# Patient Record
Sex: MALE | Race: WHITE | NOT HISPANIC OR LATINO | Employment: OTHER | RURAL
[De-identification: names, ages, dates, MRNs, and addresses within clinical notes are randomized per-mention and may not be internally consistent; named-entity substitution may affect disease eponyms.]

---

## 2020-12-03 ENCOUNTER — HISTORICAL (OUTPATIENT)
Dept: ADMINISTRATIVE | Facility: HOSPITAL | Age: 80
End: 2020-12-03

## 2021-03-29 DIAGNOSIS — M54.50 LOW BACK PAIN: Primary | ICD-10-CM

## 2021-04-09 DIAGNOSIS — M54.9 DORSALGIA, UNSPECIFIED: ICD-10-CM

## 2021-04-12 ENCOUNTER — HOSPITAL ENCOUNTER (OUTPATIENT)
Dept: RADIOLOGY | Facility: HOSPITAL | Age: 81
Discharge: HOME | End: 2021-04-12
Attending: ORTHOPAEDIC SURGERY
Payer: MEDICARE

## 2021-04-12 ENCOUNTER — OFFICE VISIT (OUTPATIENT)
Dept: SPINE | Facility: CLINIC | Age: 81
End: 2021-04-12
Payer: MEDICARE

## 2021-04-12 VITALS — WEIGHT: 220 LBS | BODY MASS INDEX: 30.8 KG/M2 | HEIGHT: 71 IN

## 2021-04-12 DIAGNOSIS — M54.9 DORSALGIA, UNSPECIFIED: ICD-10-CM

## 2021-04-12 DIAGNOSIS — M54.50 LOW BACK PAIN: ICD-10-CM

## 2021-04-12 DIAGNOSIS — M47.26 OTHER SPONDYLOSIS WITH RADICULOPATHY, LUMBAR REGION: ICD-10-CM

## 2021-04-12 DIAGNOSIS — M54.16 LUMBAR RADICULOPATHY: Primary | ICD-10-CM

## 2021-04-12 PROCEDURE — 99999 PR PBB SHADOW E&M-EST. PATIENT-LVL V: ICD-10-PCS | Mod: PBBFAC,,, | Performed by: ORTHOPAEDIC SURGERY

## 2021-04-12 PROCEDURE — 99999 PR PBB SHADOW E&M-EST. PATIENT-LVL V: CPT | Mod: PBBFAC,,, | Performed by: ORTHOPAEDIC SURGERY

## 2021-04-12 PROCEDURE — 72110 X-RAY EXAM L-2 SPINE 4/>VWS: CPT | Mod: TC

## 2021-04-12 PROCEDURE — 99204 PR OFFICE/OUTPT VISIT, NEW, LEVL IV, 45-59 MIN: ICD-10-PCS | Mod: S$PBB,,, | Performed by: ORTHOPAEDIC SURGERY

## 2021-04-12 PROCEDURE — 72110 XR LUMBAR SPINE 5 VIEW WITH FLEX AND EXT: ICD-10-PCS | Mod: 26,,, | Performed by: ORTHOPAEDIC SURGERY

## 2021-04-12 PROCEDURE — 72110 X-RAY EXAM L-2 SPINE 4/>VWS: CPT | Mod: PBBFAC | Performed by: ORTHOPAEDIC SURGERY

## 2021-04-12 PROCEDURE — 99204 OFFICE O/P NEW MOD 45 MIN: CPT | Mod: S$PBB,,, | Performed by: ORTHOPAEDIC SURGERY

## 2021-04-12 PROCEDURE — 72110 X-RAY EXAM L-2 SPINE 4/>VWS: CPT | Mod: 26,,, | Performed by: ORTHOPAEDIC SURGERY

## 2021-04-12 PROCEDURE — 99215 OFFICE O/P EST HI 40 MIN: CPT | Mod: PBBFAC,25 | Performed by: ORTHOPAEDIC SURGERY

## 2021-04-12 RX ORDER — LISINOPRIL AND HYDROCHLOROTHIAZIDE 20; 25 MG/1; MG/1
TABLET ORAL
COMMUNITY
Start: 2020-09-11 | End: 2022-10-23

## 2021-04-12 RX ORDER — ASPIRIN 81 MG/1
TABLET ORAL
COMMUNITY
Start: 2020-09-11

## 2021-04-12 RX ORDER — BUPROPION HYDROCHLORIDE 150 MG/1
150 TABLET ORAL EVERY MORNING
Status: ON HOLD | COMMUNITY
Start: 2021-01-09 | End: 2023-04-06 | Stop reason: HOSPADM

## 2021-04-12 RX ORDER — ASCORBIC ACID 500 MG
TABLET ORAL
COMMUNITY
Start: 2021-02-01 | End: 2022-10-23

## 2021-04-12 RX ORDER — DOXYLAMINE SUCCINATE AND PHENYLEPHRINE HYDROCHLORIDE 10.5; 1 MG/1; MG/1
1 TABLET ORAL 4 TIMES DAILY PRN
Status: ON HOLD | COMMUNITY
Start: 2021-01-14 | End: 2023-04-06 | Stop reason: HOSPADM

## 2021-04-12 RX ORDER — OMEPRAZOLE 20 MG/1
CAPSULE, DELAYED RELEASE ORAL
COMMUNITY
Start: 2020-09-21 | End: 2022-10-23

## 2021-04-12 RX ORDER — LANOLIN ALCOHOL/MO/W.PET/CERES
CREAM (GRAM) TOPICAL
COMMUNITY
Start: 2020-09-11 | End: 2022-10-23

## 2021-04-12 RX ORDER — PNV NO.95/FERROUS FUM/FOLIC AC 28MG-0.8MG
1000 TABLET ORAL
COMMUNITY

## 2021-04-12 RX ORDER — CLONIDINE HYDROCHLORIDE 0.1 MG/1
TABLET ORAL
COMMUNITY
Start: 2020-09-11

## 2021-04-12 RX ORDER — VENLAFAXINE 75 MG/1
TABLET ORAL
Status: ON HOLD | COMMUNITY
Start: 2021-03-08 | End: 2023-04-06 | Stop reason: HOSPADM

## 2021-04-12 RX ORDER — LANOLIN ALCOHOL/MO/W.PET/CERES
100 CREAM (GRAM) TOPICAL
COMMUNITY

## 2021-04-12 RX ORDER — CHOLECALCIFEROL (VITAMIN D3) 25 MCG
TABLET ORAL
COMMUNITY
Start: 2021-02-01

## 2021-04-12 RX ORDER — HYDROCODONE BITARTRATE AND ACETAMINOPHEN 10; 325 MG/1; MG/1
TABLET ORAL
COMMUNITY
Start: 2021-02-01 | End: 2022-10-23

## 2021-04-12 RX ORDER — ZOLPIDEM TARTRATE 10 MG/1
10 TABLET ORAL
COMMUNITY

## 2021-04-12 RX ORDER — IBUPROFEN 100 MG/5ML
500 SUSPENSION, ORAL (FINAL DOSE FORM) ORAL
COMMUNITY
End: 2022-10-23

## 2021-04-12 RX ORDER — HYDROCODONE BITARTRATE AND ACETAMINOPHEN 10; 325 MG/1; MG/1
TABLET ORAL
COMMUNITY
Start: 2021-03-08 | End: 2022-10-23

## 2021-04-12 RX ORDER — LEVOTHYROXINE SODIUM 100 UG/1
TABLET ORAL
Status: ON HOLD | COMMUNITY
Start: 2021-03-20 | End: 2023-10-30

## 2021-04-12 RX ORDER — CITALOPRAM 40 MG/1
TABLET, FILM COATED ORAL
COMMUNITY
Start: 2020-09-11

## 2021-04-22 ENCOUNTER — TELEPHONE (OUTPATIENT)
Dept: SPINE | Facility: CLINIC | Age: 81
End: 2021-04-22

## 2021-04-29 ENCOUNTER — TELEPHONE (OUTPATIENT)
Dept: SPINE | Facility: CLINIC | Age: 81
End: 2021-04-29

## 2021-05-24 ENCOUNTER — HOSPITAL ENCOUNTER (EMERGENCY)
Facility: HOSPITAL | Age: 81
Discharge: HOME | End: 2021-05-24
Attending: EMERGENCY MEDICINE
Payer: MEDICARE

## 2021-05-24 VITALS
WEIGHT: 216 LBS | HEART RATE: 64 BPM | DIASTOLIC BLOOD PRESSURE: 70 MMHG | RESPIRATION RATE: 18 BRPM | OXYGEN SATURATION: 96 % | TEMPERATURE: 98 F | HEIGHT: 71 IN | BODY MASS INDEX: 30.24 KG/M2 | SYSTOLIC BLOOD PRESSURE: 129 MMHG

## 2021-05-24 DIAGNOSIS — Z13.9 SCREENING FOR CONDITION: ICD-10-CM

## 2021-05-24 DIAGNOSIS — I50.21 ACUTE SYSTOLIC CONGESTIVE HEART FAILURE: ICD-10-CM

## 2021-05-24 DIAGNOSIS — R06.02 SHORTNESS OF BREATH: Primary | ICD-10-CM

## 2021-05-24 LAB
ALBUMIN SERPL BCP-MCNC: 3.9 G/DL (ref 3.5–5)
ALBUMIN/GLOB SERPL: 1.1 {RATIO}
ALP SERPL-CCNC: 87 U/L (ref 45–115)
ALT SERPL W P-5'-P-CCNC: 29 U/L (ref 16–61)
ANION GAP SERPL CALCULATED.3IONS-SCNC: 17 MMOL/L (ref 7–16)
AST SERPL W P-5'-P-CCNC: 32 U/L (ref 15–37)
BASOPHILS # BLD AUTO: 0.05 K/UL (ref 0–0.2)
BASOPHILS NFR BLD AUTO: 0.8 % (ref 0–1)
BILIRUB SERPL-MCNC: 0.7 MG/DL (ref 0–1.2)
BUN SERPL-MCNC: 20 MG/DL (ref 7–18)
BUN/CREAT SERPL: 16 (ref 6–20)
CALCIUM SERPL-MCNC: 9.4 MG/DL (ref 8.5–10.1)
CHLORIDE SERPL-SCNC: 103 MMOL/L (ref 98–107)
CO2 SERPL-SCNC: 24 MMOL/L (ref 21–32)
CREAT SERPL-MCNC: 1.25 MG/DL (ref 0.7–1.3)
D DIMER PPP FEU-MCNC: 0.59 ΜG/ML (ref 0–0.47)
DIFFERENTIAL METHOD BLD: ABNORMAL
EOSINOPHIL # BLD AUTO: 0.14 K/UL (ref 0–0.5)
EOSINOPHIL NFR BLD AUTO: 2.1 % (ref 1–4)
ERYTHROCYTE [DISTWIDTH] IN BLOOD BY AUTOMATED COUNT: 12.4 % (ref 11.5–14.5)
GLOBULIN SER-MCNC: 3.7 G/DL (ref 2–4)
GLUCOSE SERPL-MCNC: 105 MG/DL (ref 74–106)
HCT VFR BLD AUTO: 47.4 % (ref 40–54)
HGB BLD-MCNC: 15.8 G/DL (ref 13.5–18)
IMM GRANULOCYTES # BLD AUTO: 0.01 K/UL (ref 0–0.04)
IMM GRANULOCYTES NFR BLD: 0.2 % (ref 0–0.4)
LYMPHOCYTES # BLD AUTO: 1.44 K/UL (ref 1–4.8)
LYMPHOCYTES NFR BLD AUTO: 21.8 % (ref 27–41)
MCH RBC QN AUTO: 31.2 PG (ref 27–31)
MCHC RBC AUTO-ENTMCNC: 33.3 G/DL (ref 32–36)
MCV RBC AUTO: 93.5 FL (ref 80–96)
MONOCYTES # BLD AUTO: 0.98 K/UL (ref 0–0.8)
MONOCYTES NFR BLD AUTO: 14.8 % (ref 2–6)
MPC BLD CALC-MCNC: 9.6 FL (ref 9.4–12.4)
NEUTROPHILS # BLD AUTO: 3.98 K/UL (ref 1.8–7.7)
NEUTROPHILS NFR BLD AUTO: 60.3 % (ref 53–65)
NRBC # BLD AUTO: 0 X10E3/UL
NRBC, AUTO (.00): 0 %
NT-PROBNP SERPL-MCNC: 509 PG/ML (ref 1–450)
PLATELET # BLD AUTO: 265 K/UL (ref 150–400)
POTASSIUM SERPL-SCNC: 4.1 MMOL/L (ref 3.5–5.1)
PROT SERPL-MCNC: 7.6 G/DL (ref 6.4–8.2)
RBC # BLD AUTO: 5.07 M/UL (ref 4.6–6.2)
SODIUM SERPL-SCNC: 140 MMOL/L (ref 136–145)
TROPONIN I SERPL-MCNC: <0.017 NG/ML
TROPONIN I SERPL-MCNC: <0.017 NG/ML
WBC # BLD AUTO: 6.6 K/UL (ref 4.5–11)

## 2021-05-24 PROCEDURE — 99284 PR EMERGENCY DEPT VISIT,LEVEL IV: ICD-10-PCS | Mod: ,,, | Performed by: EMERGENCY MEDICINE

## 2021-05-24 PROCEDURE — 25000242 PHARM REV CODE 250 ALT 637 W/ HCPCS: Performed by: EMERGENCY MEDICINE

## 2021-05-24 PROCEDURE — 96374 THER/PROPH/DIAG INJ IV PUSH: CPT | Mod: 59 | Performed by: EMERGENCY MEDICINE

## 2021-05-24 PROCEDURE — 36415 COLL VENOUS BLD VENIPUNCTURE: CPT | Performed by: EMERGENCY MEDICINE

## 2021-05-24 PROCEDURE — 93005 ELECTROCARDIOGRAM TRACING: CPT

## 2021-05-24 PROCEDURE — 80053 COMPREHEN METABOLIC PANEL: CPT | Performed by: EMERGENCY MEDICINE

## 2021-05-24 PROCEDURE — 85378 FIBRIN DEGRADE SEMIQUANT: CPT | Performed by: EMERGENCY MEDICINE

## 2021-05-24 PROCEDURE — 99284 EMERGENCY DEPT VISIT MOD MDM: CPT | Mod: ,,, | Performed by: EMERGENCY MEDICINE

## 2021-05-24 PROCEDURE — 85025 COMPLETE CBC W/AUTO DIFF WBC: CPT | Performed by: EMERGENCY MEDICINE

## 2021-05-24 PROCEDURE — 93010 ELECTROCARDIOGRAM REPORT: CPT | Mod: ,,, | Performed by: HOSPITALIST

## 2021-05-24 PROCEDURE — 83880 ASSAY OF NATRIURETIC PEPTIDE: CPT | Performed by: EMERGENCY MEDICINE

## 2021-05-24 PROCEDURE — 84484 ASSAY OF TROPONIN QUANT: CPT | Mod: 59 | Performed by: EMERGENCY MEDICINE

## 2021-05-24 PROCEDURE — 63600175 PHARM REV CODE 636 W HCPCS: Performed by: EMERGENCY MEDICINE

## 2021-05-24 PROCEDURE — 99285 EMERGENCY DEPT VISIT HI MDM: CPT | Mod: 25 | Performed by: EMERGENCY MEDICINE

## 2021-05-24 PROCEDURE — 84484 ASSAY OF TROPONIN QUANT: CPT | Performed by: EMERGENCY MEDICINE

## 2021-05-24 PROCEDURE — 93010 EKG 12-LEAD: ICD-10-PCS | Mod: ,,, | Performed by: HOSPITALIST

## 2021-05-24 PROCEDURE — 25500020 PHARM REV CODE 255: Performed by: EMERGENCY MEDICINE

## 2021-05-24 RX ORDER — IPRATROPIUM BROMIDE AND ALBUTEROL SULFATE 2.5; .5 MG/3ML; MG/3ML
3 SOLUTION RESPIRATORY (INHALATION)
Status: COMPLETED | OUTPATIENT
Start: 2021-05-24 | End: 2021-05-24

## 2021-05-24 RX ORDER — FUROSEMIDE 10 MG/ML
20 INJECTION INTRAMUSCULAR; INTRAVENOUS
Status: COMPLETED | OUTPATIENT
Start: 2021-05-24 | End: 2021-05-24

## 2021-05-24 RX ORDER — BUDESONIDE 0.5 MG/2ML
0.5 INHALANT ORAL ONCE
Status: COMPLETED | OUTPATIENT
Start: 2021-05-24 | End: 2021-05-24

## 2021-05-24 RX ADMIN — IPRATROPIUM BROMIDE AND ALBUTEROL SULFATE 3 ML: .5; 3 SOLUTION RESPIRATORY (INHALATION) at 05:05

## 2021-05-24 RX ADMIN — FUROSEMIDE 20 MG: 10 INJECTION, SOLUTION INTRAMUSCULAR; INTRAVENOUS at 07:05

## 2021-05-24 RX ADMIN — BUDESONIDE 0.5 MG: 0.5 INHALANT RESPIRATORY (INHALATION) at 07:05

## 2021-05-24 RX ADMIN — IOPAMIDOL 100 ML: 755 INJECTION, SOLUTION INTRAVENOUS at 05:05

## 2022-10-23 ENCOUNTER — OFFICE VISIT (OUTPATIENT)
Dept: FAMILY MEDICINE | Facility: CLINIC | Age: 82
End: 2022-10-23
Payer: MEDICARE

## 2022-10-23 VITALS
WEIGHT: 216 LBS | SYSTOLIC BLOOD PRESSURE: 128 MMHG | RESPIRATION RATE: 18 BRPM | HEIGHT: 71 IN | OXYGEN SATURATION: 95 % | HEART RATE: 98 BPM | DIASTOLIC BLOOD PRESSURE: 79 MMHG | BODY MASS INDEX: 30.24 KG/M2 | TEMPERATURE: 99 F

## 2022-10-23 DIAGNOSIS — Z11.52 ENCOUNTER FOR SCREENING LABORATORY TESTING FOR COVID-19 VIRUS: ICD-10-CM

## 2022-10-23 DIAGNOSIS — U07.1 COVID: Primary | ICD-10-CM

## 2022-10-23 DIAGNOSIS — R68.83 CHILLS: ICD-10-CM

## 2022-10-23 PROBLEM — G89.29 CHRONIC PAIN: Status: ACTIVE | Noted: 2022-10-23

## 2022-10-23 PROBLEM — I48.0 PAROXYSMAL ATRIAL FIBRILLATION: Status: ACTIVE | Noted: 2022-10-23

## 2022-10-23 PROBLEM — G47.30 SLEEP APNEA: Status: ACTIVE | Noted: 2022-10-23

## 2022-10-23 PROBLEM — R73.03 PREDIABETES: Status: ACTIVE | Noted: 2022-10-23

## 2022-10-23 PROBLEM — E55.9 VITAMIN D DEFICIENCY: Status: ACTIVE | Noted: 2022-10-23

## 2022-10-23 PROBLEM — N40.0 BENIGN PROSTATIC HYPERPLASIA: Status: ACTIVE | Noted: 2021-05-14

## 2022-10-23 PROBLEM — R39.15 URINARY URGENCY: Status: ACTIVE | Noted: 2019-04-09

## 2022-10-23 PROBLEM — F32.9 REACTIVE DEPRESSION: Status: ACTIVE | Noted: 2022-10-23

## 2022-10-23 PROBLEM — K21.9 GASTROESOPHAGEAL REFLUX DISEASE WITHOUT ESOPHAGITIS: Status: ACTIVE | Noted: 2022-10-23

## 2022-10-23 PROBLEM — F41.1 GENERALIZED ANXIETY DISORDER: Status: ACTIVE | Noted: 2022-10-23

## 2022-10-23 PROBLEM — S01.81XA LACERATION OF FOREHEAD, COMPLICATED: Status: ACTIVE | Noted: 2022-10-23

## 2022-10-23 PROBLEM — J32.9 RHINOSINUSITIS: Status: ACTIVE | Noted: 2022-10-23

## 2022-10-23 PROBLEM — R35.0 FREQUENCY OF URINATION: Status: ACTIVE | Noted: 2022-10-23

## 2022-10-23 PROBLEM — Z71.89 OTHER SPECIFIED COUNSELING: Status: ACTIVE | Noted: 2022-10-23

## 2022-10-23 PROBLEM — M54.50 LOW BACK PAIN: Status: ACTIVE | Noted: 2022-10-23

## 2022-10-23 PROBLEM — E66.9 OBESITY: Status: ACTIVE | Noted: 2022-10-23

## 2022-10-23 PROBLEM — G47.61 PERIODIC LIMB MOVEMENT DISORDER: Status: ACTIVE | Noted: 2022-10-23

## 2022-10-23 PROBLEM — F32.1 MODERATE MAJOR DEPRESSION, SINGLE EPISODE: Status: ACTIVE | Noted: 2022-10-23

## 2022-10-23 PROBLEM — E53.8 COBALAMIN DEFICIENCY: Status: ACTIVE | Noted: 2022-10-23

## 2022-10-23 PROBLEM — I10 ESSENTIAL HYPERTENSION: Status: ACTIVE | Noted: 2022-10-23

## 2022-10-23 PROBLEM — E03.9 HYPOTHYROIDISM: Status: ACTIVE | Noted: 2022-10-23

## 2022-10-23 PROBLEM — E78.2 MIXED HYPERLIPIDEMIA: Status: ACTIVE | Noted: 2022-10-23

## 2022-10-23 LAB
CTP QC/QA: YES
CTP QC/QA: YES
FLUAV AG NPH QL: NEGATIVE
FLUBV AG NPH QL: NEGATIVE
SARS-COV-2 AG RESP QL IA.RAPID: POSITIVE

## 2022-10-23 PROCEDURE — 99203 PR OFFICE/OUTPT VISIT, NEW, LEVL III, 30-44 MIN: ICD-10-PCS | Mod: CR,,, | Performed by: NURSE PRACTITIONER

## 2022-10-23 PROCEDURE — 87426 SARSCOV CORONAVIRUS AG IA: CPT | Mod: RHCUB | Performed by: NURSE PRACTITIONER

## 2022-10-23 PROCEDURE — 87804 INFLUENZA ASSAY W/OPTIC: CPT | Mod: 59,91,RHCUB | Performed by: NURSE PRACTITIONER

## 2022-10-23 PROCEDURE — 99203 OFFICE O/P NEW LOW 30 MIN: CPT | Mod: CR,,, | Performed by: NURSE PRACTITIONER

## 2022-10-23 RX ORDER — PYRIDOXINE HCL (VITAMIN B6) 100 MG
1 TABLET ORAL
Status: ON HOLD | COMMUNITY
End: 2023-04-06 | Stop reason: HOSPADM

## 2022-10-23 RX ORDER — CYANOCOBALAMIN (VITAMIN B-12) 500 MCG
3 TABLET ORAL NIGHTLY PRN
Status: ON HOLD | COMMUNITY
End: 2023-10-30

## 2022-10-23 RX ORDER — ZINC SULFATE 50(220)MG
1 CAPSULE ORAL DAILY
Status: ON HOLD | COMMUNITY
Start: 2022-03-08 | End: 2023-04-06 | Stop reason: HOSPADM

## 2022-10-23 RX ORDER — CEPHALEXIN 250 MG
1 CAPSULE ORAL
Status: ON HOLD | COMMUNITY
End: 2023-04-06 | Stop reason: HOSPADM

## 2022-10-23 RX ORDER — CALCIUM CARBONATE 600 MG
TABLET ORAL
Status: ON HOLD | COMMUNITY
End: 2023-04-06 | Stop reason: HOSPADM

## 2022-10-23 RX ORDER — METHYLPHENIDATE HYDROCHLORIDE 5 MG/1
5 TABLET ORAL
Status: ON HOLD | COMMUNITY
End: 2023-04-06 | Stop reason: HOSPADM

## 2022-10-23 RX ORDER — MUPIROCIN 20 MG/G
OINTMENT TOPICAL 2 TIMES DAILY
Status: ON HOLD | COMMUNITY
Start: 2022-08-22 | End: 2023-04-06 | Stop reason: HOSPADM

## 2022-10-23 RX ORDER — OXYBUTYNIN CHLORIDE 15 MG/1
1 TABLET, EXTENDED RELEASE ORAL DAILY
COMMUNITY
Start: 2022-02-21

## 2022-10-23 RX ORDER — METHOCARBAMOL 500 MG/1
1 TABLET, FILM COATED ORAL
Status: ON HOLD | COMMUNITY
End: 2023-04-06 | Stop reason: HOSPADM

## 2022-10-23 RX ORDER — PANTOPRAZOLE SODIUM 40 MG/1
40 TABLET, DELAYED RELEASE ORAL DAILY
Status: ON HOLD | COMMUNITY
Start: 2022-07-13 | End: 2023-04-06 | Stop reason: HOSPADM

## 2022-10-23 RX ORDER — LISINOPRIL AND HYDROCHLOROTHIAZIDE 12.5; 2 MG/1; MG/1
1 TABLET ORAL
Status: ON HOLD | COMMUNITY
End: 2023-04-06 | Stop reason: HOSPADM

## 2022-10-23 RX ORDER — ARIPIPRAZOLE 5 MG/1
1 TABLET ORAL
COMMUNITY
Start: 2022-07-05

## 2022-10-23 RX ORDER — POLYETHYLENE GLYCOL 3350 17 G/17G
17 POWDER, FOR SOLUTION ORAL
COMMUNITY

## 2022-10-23 RX ORDER — ARIPIPRAZOLE 2 MG/1
2 TABLET ORAL NIGHTLY
Status: ON HOLD | COMMUNITY
Start: 2022-06-06 | End: 2023-04-06 | Stop reason: HOSPADM

## 2022-10-23 RX ORDER — SERTRALINE HYDROCHLORIDE 50 MG/1
1 TABLET, FILM COATED ORAL
Status: ON HOLD | COMMUNITY
End: 2023-04-06 | Stop reason: HOSPADM

## 2022-10-23 RX ORDER — OMEPRAZOLE 40 MG/1
40 CAPSULE, DELAYED RELEASE ORAL
COMMUNITY
Start: 2022-07-25

## 2022-10-23 RX ORDER — PSYLLIUM HUSK/CALCIUM CARB 1 G-60 MG
CAPSULE ORAL
Status: ON HOLD | COMMUNITY
End: 2023-04-06 | Stop reason: HOSPADM

## 2022-10-23 RX ORDER — MAGNESIUM OXIDE/MAG AA CHELATE 300 MG
CAPSULE ORAL
Status: ON HOLD | COMMUNITY
End: 2023-04-06 | Stop reason: HOSPADM

## 2022-10-23 RX ORDER — HYDROCODONE BITARTRATE AND ACETAMINOPHEN 7.5; 325 MG/1; MG/1
1 TABLET ORAL
Status: ON HOLD | COMMUNITY
End: 2023-04-06 | Stop reason: HOSPADM

## 2022-10-23 RX ORDER — TRAMADOL HYDROCHLORIDE 50 MG/1
1 TABLET ORAL
Status: ON HOLD | COMMUNITY
End: 2023-04-06 | Stop reason: HOSPADM

## 2022-10-23 RX ORDER — ESZOPICLONE 3 MG/1
3 TABLET, FILM COATED ORAL NIGHTLY PRN
Status: ON HOLD | COMMUNITY
Start: 2022-07-05 | End: 2023-04-06 | Stop reason: HOSPADM

## 2022-10-23 RX ORDER — VITAMIN E 268 MG
1 CAPSULE ORAL
COMMUNITY

## 2022-10-23 RX ORDER — CLORAZEPATE DIPOTASSIUM 3.75 MG/1
3.75-7.5 TABLET ORAL 3 TIMES DAILY PRN
Status: ON HOLD | COMMUNITY
Start: 2022-07-13 | End: 2023-04-06 | Stop reason: HOSPADM

## 2022-10-23 RX ORDER — PRAMIPEXOLE DIHYDROCHLORIDE 0.12 MG/1
0.12 TABLET ORAL
Status: ON HOLD | COMMUNITY
Start: 2022-09-23 | End: 2023-04-06 | Stop reason: HOSPADM

## 2022-10-23 NOTE — PROGRESS NOTES
"  Subjective:       Patient ID: Joey King is a 82 y.o. male.    Chief Complaint: Chills (Chills started this morning.  Took flu shot a week ago)    Presents to clinic with son as above. Mild cough. No measured fever. No SOB. No nasal congestion or runny nose. No sore throat.     Review of Systems   Constitutional:  Positive for chills and malaise/fatigue.   HENT:  Negative for congestion, ear pain and sore throat.    Respiratory:  Positive for cough. Negative for hemoptysis, sputum production, shortness of breath and wheezing.    Cardiovascular: Negative.    Gastrointestinal: Negative.    Musculoskeletal:  Positive for myalgias.        Reviewed family, medical, surgical, and social history.    Objective:      /79   Pulse 98   Temp 98.7 °F (37.1 °C)   Resp 18   Ht 5' 11" (1.803 m)   Wt 98 kg (216 lb)   SpO2 95%   BMI 30.13 kg/m²   Physical Exam  Vitals and nursing note reviewed.   Constitutional:       General: He is not in acute distress.     Appearance: Normal appearance. He is not ill-appearing, toxic-appearing or diaphoretic.   HENT:      Head: Normocephalic.      Right Ear: Hearing, tympanic membrane, ear canal and external ear normal.      Left Ear: Hearing, tympanic membrane, ear canal and external ear normal.      Nose: No mucosal edema, congestion or rhinorrhea.      Right Turbinates: Not enlarged or swollen.      Left Turbinates: Not enlarged or swollen.      Right Sinus: No maxillary sinus tenderness or frontal sinus tenderness.      Left Sinus: No maxillary sinus tenderness or frontal sinus tenderness.      Mouth/Throat:      Lips: Pink.      Mouth: Mucous membranes are moist.      Pharynx: Uvula midline. No pharyngeal swelling, oropharyngeal exudate, posterior oropharyngeal erythema or uvula swelling.      Tonsils: No tonsillar exudate or tonsillar abscesses.   Cardiovascular:      Rate and Rhythm: Normal rate and regular rhythm.      Heart sounds: Normal heart sounds. "   Pulmonary:      Effort: Pulmonary effort is normal.      Breath sounds: Normal breath sounds.   Skin:     General: Skin is warm and dry.   Neurological:      Mental Status: He is alert.   Psychiatric:         Mood and Affect: Mood normal.         Behavior: Behavior normal.         Thought Content: Thought content normal.         Judgment: Judgment normal.          Office Visit on 10/23/2022   Component Date Value Ref Range Status    Rapid Influenza A Ag 10/23/2022 Negative  Negative Final    Rapid Influenza B Ag 10/23/2022 Negative  Negative Final     Acceptable 10/23/2022 Yes   Final    SARS Coronavirus 2 Antigen 10/23/2022 Positive (A)  Negative Final     Acceptable 10/23/2022 Yes   Final      Assessment:       1. COVID    2. Chills    3. Encounter for screening laboratory testing for COVID-19 virus          Plan:       COVID  -     Ambulatory referral/consult to [facility]; Future; Expected date: 10/24/2022    Chills  -     POCT Influenza A/B    Encounter for screening laboratory testing for COVID-19 virus  -     SARS Coronavirus 2 Antigen, POCT  Quarantine for 5-7 days  OTC meds for symptomatic relief  Offered infusion and unsure if will get it. He will decide and cancel when they call if decides not to get it.   Drink plenty of fluids  Go to ER with any respiratory distress  Copy of result given  RTC PRN           Risks, benefits, and side effects were discussed with the patient. All questions were answered to the fullest satisfaction of the patient, and pt verbalized understanding and agreement to treatment plan. Pt was to call with any new or worsening symptoms, or present to the ER.

## 2022-10-24 ENCOUNTER — INFUSION (OUTPATIENT)
Dept: INFECTIOUS DISEASES | Facility: HOSPITAL | Age: 82
End: 2022-10-24
Attending: NURSE PRACTITIONER
Payer: MEDICARE

## 2022-10-24 VITALS
DIASTOLIC BLOOD PRESSURE: 66 MMHG | OXYGEN SATURATION: 96 % | SYSTOLIC BLOOD PRESSURE: 100 MMHG | HEART RATE: 85 BPM | TEMPERATURE: 98 F | RESPIRATION RATE: 18 BRPM

## 2022-10-24 DIAGNOSIS — U07.1 COVID: ICD-10-CM

## 2022-10-24 PROCEDURE — M0222 HC IV INJECTION, BEBTELOVIMAB, INCL POST ADMIN MONIT: HCPCS | Performed by: NURSE PRACTITIONER

## 2022-10-24 PROCEDURE — 63600175 PHARM REV CODE 636 W HCPCS: Performed by: NURSE PRACTITIONER

## 2022-10-24 RX ORDER — BEBTELOVIMAB 87.5 MG/ML
175 INJECTION, SOLUTION INTRAVENOUS
Status: COMPLETED | OUTPATIENT
Start: 2022-10-24 | End: 2022-10-24

## 2022-10-24 RX ORDER — EPINEPHRINE 0.3 MG/.3ML
0.3 INJECTION SUBCUTANEOUS
Status: ACTIVE | OUTPATIENT
Start: 2022-10-24 | End: 2022-10-27

## 2022-10-24 RX ORDER — DIPHENHYDRAMINE HYDROCHLORIDE 50 MG/ML
25 INJECTION INTRAMUSCULAR; INTRAVENOUS
Status: ACTIVE | OUTPATIENT
Start: 2022-10-24 | End: 2022-10-25

## 2022-10-24 RX ORDER — ACETAMINOPHEN 325 MG/1
650 TABLET ORAL
Status: ACTIVE | OUTPATIENT
Start: 2022-10-24 | End: 2022-10-25

## 2022-10-24 RX ORDER — ALBUTEROL SULFATE 90 UG/1
2 AEROSOL, METERED RESPIRATORY (INHALATION)
Status: ACTIVE | OUTPATIENT
Start: 2022-10-24 | End: 2022-10-27

## 2022-10-24 RX ORDER — ONDANSETRON 4 MG/1
4 TABLET, ORALLY DISINTEGRATING ORAL
Status: ACTIVE | OUTPATIENT
Start: 2022-10-24 | End: 2022-10-25

## 2022-10-24 RX ADMIN — BEBTELOVIMAB 175 MG: 87.5 INJECTION, SOLUTION INTRAVENOUS at 10:10

## 2023-03-29 ENCOUNTER — HOSPITAL ENCOUNTER (INPATIENT)
Facility: HOSPITAL | Age: 83
LOS: 8 days | Discharge: HOME HEALTH | DRG: 948 | End: 2023-04-06
Attending: FAMILY MEDICINE | Admitting: FAMILY MEDICINE
Payer: MEDICARE

## 2023-03-29 DIAGNOSIS — E53.8 COBALAMIN DEFICIENCY: ICD-10-CM

## 2023-03-29 DIAGNOSIS — I10 ESSENTIAL HYPERTENSION: ICD-10-CM

## 2023-03-29 DIAGNOSIS — R53.1 WEAKNESS GENERALIZED: ICD-10-CM

## 2023-03-29 DIAGNOSIS — E55.9 VITAMIN D DEFICIENCY: ICD-10-CM

## 2023-03-29 DIAGNOSIS — I48.0 PAROXYSMAL ATRIAL FIBRILLATION: ICD-10-CM

## 2023-03-29 DIAGNOSIS — E03.9 HYPOTHYROIDISM, UNSPECIFIED TYPE: ICD-10-CM

## 2023-03-29 DIAGNOSIS — R53.1 WEAKNESS: Primary | ICD-10-CM

## 2023-03-29 DIAGNOSIS — K21.9 GASTROESOPHAGEAL REFLUX DISEASE WITHOUT ESOPHAGITIS: ICD-10-CM

## 2023-03-29 DIAGNOSIS — G47.33 OSA (OBSTRUCTIVE SLEEP APNEA): ICD-10-CM

## 2023-03-29 DIAGNOSIS — R35.0 FREQUENCY OF URINATION: ICD-10-CM

## 2023-03-29 LAB
BACTERIA #/AREA URNS HPF: ABNORMAL /HPF
BILIRUB UR QL STRIP: NEGATIVE
CLARITY UR: CLEAR
COLOR UR: YELLOW
GLUCOSE UR STRIP-MCNC: NEGATIVE MG/DL
KETONES UR STRIP-SCNC: NEGATIVE MG/DL
LEUKOCYTE ESTERASE UR QL STRIP: NEGATIVE
Lab: ABNORMAL /LPF
NITRITE UR QL STRIP: NEGATIVE
PH UR STRIP: 5.5 PH UNITS
PROT UR QL STRIP: 100
RBC # UR STRIP: ABNORMAL /UL
RBC #/AREA URNS HPF: ABNORMAL /HPF
RENAL EPI CELLS #/AREA URNS LPF: ABNORMAL /LPF
SODIUM URATE CRYSTALS [#/AREA] IN URINE SEDIMENT BY MICROSCOPY LOW POWER FIELD: ABNORMAL /LPF
SP GR UR STRIP: >=1.03
SQUAMOUS #/AREA URNS LPF: ABNORMAL /LPF
TRANS CELLS #/AREA URNS LPF: ABNORMAL /LPF
UROBILINOGEN UR STRIP-ACNC: 0.2 MG/DL
WBC #/AREA URNS HPF: ABNORMAL /HPF
YEAST #/AREA URNS HPF: ABNORMAL /HPF

## 2023-03-29 PROCEDURE — 87086 URINE CULTURE/COLONY COUNT: CPT | Performed by: NURSE PRACTITIONER

## 2023-03-29 PROCEDURE — 94761 N-INVAS EAR/PLS OXIMETRY MLT: CPT

## 2023-03-29 PROCEDURE — 99900035 HC TECH TIME PER 15 MIN (STAT)

## 2023-03-29 PROCEDURE — 87077 CULTURE AEROBIC IDENTIFY: CPT | Performed by: NURSE PRACTITIONER

## 2023-03-29 PROCEDURE — 11000004 HC SNF PRIVATE

## 2023-03-29 PROCEDURE — 25000003 PHARM REV CODE 250: Performed by: NURSE PRACTITIONER

## 2023-03-29 PROCEDURE — 25000003 PHARM REV CODE 250: Performed by: FAMILY MEDICINE

## 2023-03-29 PROCEDURE — 81001 URINALYSIS AUTO W/SCOPE: CPT | Performed by: NURSE PRACTITIONER

## 2023-03-29 RX ORDER — CITALOPRAM 20 MG/1
40 TABLET, FILM COATED ORAL DAILY
Status: DISCONTINUED | OUTPATIENT
Start: 2023-03-30 | End: 2023-04-06 | Stop reason: HOSPADM

## 2023-03-29 RX ORDER — CLONIDINE HYDROCHLORIDE 0.1 MG/1
0.1 TABLET ORAL NIGHTLY
Status: DISCONTINUED | OUTPATIENT
Start: 2023-03-29 | End: 2023-04-06 | Stop reason: HOSPADM

## 2023-03-29 RX ORDER — LEVALBUTEROL 1.25 MG/.5ML
0.25 SOLUTION, CONCENTRATE RESPIRATORY (INHALATION) EVERY 4 HOURS PRN
Status: DISCONTINUED | OUTPATIENT
Start: 2023-03-29 | End: 2023-03-31

## 2023-03-29 RX ORDER — TALC
6 POWDER (GRAM) TOPICAL NIGHTLY PRN
Status: DISCONTINUED | OUTPATIENT
Start: 2023-03-29 | End: 2023-03-29

## 2023-03-29 RX ORDER — LANOLIN ALCOHOL/MO/W.PET/CERES
100 CREAM (GRAM) TOPICAL DAILY
Status: DISCONTINUED | OUTPATIENT
Start: 2023-03-30 | End: 2023-03-30

## 2023-03-29 RX ORDER — MUPIROCIN 20 MG/G
OINTMENT TOPICAL 2 TIMES DAILY
Status: COMPLETED | OUTPATIENT
Start: 2023-03-29 | End: 2023-04-03

## 2023-03-29 RX ORDER — AMOXICILLIN 250 MG
1 CAPSULE ORAL 2 TIMES DAILY
Status: DISCONTINUED | OUTPATIENT
Start: 2023-03-29 | End: 2023-04-06 | Stop reason: HOSPADM

## 2023-03-29 RX ORDER — LEVOTHYROXINE SODIUM 100 UG/1
100 TABLET ORAL
Status: DISCONTINUED | OUTPATIENT
Start: 2023-03-30 | End: 2023-04-06 | Stop reason: HOSPADM

## 2023-03-29 RX ORDER — GLUCOSAM/CHONDRO/HERB 149/HYAL 750-100 MG
1 TABLET ORAL DAILY
Status: DISCONTINUED | OUTPATIENT
Start: 2023-03-30 | End: 2023-04-06 | Stop reason: HOSPADM

## 2023-03-29 RX ORDER — OXYBUTYNIN CHLORIDE 5 MG/1
15 TABLET, EXTENDED RELEASE ORAL DAILY
Status: DISCONTINUED | OUTPATIENT
Start: 2023-03-30 | End: 2023-04-06 | Stop reason: HOSPADM

## 2023-03-29 RX ORDER — ARIPIPRAZOLE 5 MG/1
5 TABLET ORAL DAILY
Status: DISCONTINUED | OUTPATIENT
Start: 2023-03-30 | End: 2023-04-06 | Stop reason: HOSPADM

## 2023-03-29 RX ORDER — ONDANSETRON 4 MG/1
4 TABLET, ORALLY DISINTEGRATING ORAL EVERY 8 HOURS PRN
Status: DISCONTINUED | OUTPATIENT
Start: 2023-03-29 | End: 2023-04-06 | Stop reason: HOSPADM

## 2023-03-29 RX ORDER — FLECAINIDE ACETATE 50 MG/1
150 TABLET ORAL EVERY 12 HOURS
Status: DISCONTINUED | OUTPATIENT
Start: 2023-03-29 | End: 2023-04-06 | Stop reason: HOSPADM

## 2023-03-29 RX ORDER — UBIDECARENONE 75 MG
1000 CAPSULE ORAL DAILY
Status: DISCONTINUED | OUTPATIENT
Start: 2023-03-30 | End: 2023-04-06 | Stop reason: HOSPADM

## 2023-03-29 RX ORDER — TALC
6 POWDER (GRAM) TOPICAL NIGHTLY PRN
Status: DISCONTINUED | OUTPATIENT
Start: 2023-03-29 | End: 2023-03-30

## 2023-03-29 RX ORDER — ACETAMINOPHEN 325 MG/1
650 TABLET ORAL EVERY 6 HOURS PRN
Status: DISCONTINUED | OUTPATIENT
Start: 2023-03-29 | End: 2023-04-06 | Stop reason: HOSPADM

## 2023-03-29 RX ORDER — ZOLPIDEM TARTRATE 5 MG/1
10 TABLET ORAL NIGHTLY PRN
Status: DISCONTINUED | OUTPATIENT
Start: 2023-03-29 | End: 2023-04-06 | Stop reason: HOSPADM

## 2023-03-29 RX ORDER — LANOLIN ALCOHOL/MO/W.PET/CERES
1 CREAM (GRAM) TOPICAL DAILY
Status: DISCONTINUED | OUTPATIENT
Start: 2023-03-30 | End: 2023-04-06 | Stop reason: HOSPADM

## 2023-03-29 RX ORDER — CHOLECALCIFEROL (VITAMIN D3) 25 MCG
1000 TABLET ORAL DAILY
Status: DISCONTINUED | OUTPATIENT
Start: 2023-03-30 | End: 2023-04-06 | Stop reason: HOSPADM

## 2023-03-29 RX ORDER — PANTOPRAZOLE SODIUM 40 MG/1
40 TABLET, DELAYED RELEASE ORAL DAILY
Status: DISCONTINUED | OUTPATIENT
Start: 2023-03-30 | End: 2023-04-06 | Stop reason: HOSPADM

## 2023-03-29 RX ORDER — ZINC SULFATE 50(220)MG
220 CAPSULE ORAL DAILY
Status: DISCONTINUED | OUTPATIENT
Start: 2023-03-30 | End: 2023-04-06 | Stop reason: HOSPADM

## 2023-03-29 RX ORDER — CALCIUM CARBONATE 200(500)MG
500 TABLET,CHEWABLE ORAL 2 TIMES DAILY PRN
Status: DISCONTINUED | OUTPATIENT
Start: 2023-03-29 | End: 2023-04-06 | Stop reason: HOSPADM

## 2023-03-29 RX ORDER — ALBUTEROL SULFATE 0.83 MG/ML
2.5 SOLUTION RESPIRATORY (INHALATION) EVERY 4 HOURS PRN
Status: DISCONTINUED | OUTPATIENT
Start: 2023-03-29 | End: 2023-03-29

## 2023-03-29 RX ORDER — ASPIRIN 81 MG/1
81 TABLET ORAL NIGHTLY
Status: DISCONTINUED | OUTPATIENT
Start: 2023-03-29 | End: 2023-04-06 | Stop reason: HOSPADM

## 2023-03-29 RX ORDER — ASCORBIC ACID 500 MG
1000 TABLET ORAL 2 TIMES DAILY
Status: DISCONTINUED | OUTPATIENT
Start: 2023-03-29 | End: 2023-04-06 | Stop reason: HOSPADM

## 2023-03-29 RX ADMIN — MELATONIN TAB 3 MG 6 MG: 3 TAB at 09:03

## 2023-03-29 RX ADMIN — SENNOSIDES AND DOCUSATE SODIUM 1 TABLET: 50; 8.6 TABLET ORAL at 09:03

## 2023-03-29 RX ADMIN — CLONIDINE HYDROCHLORIDE 0.1 MG: 0.1 TABLET ORAL at 09:03

## 2023-03-29 RX ADMIN — FLECAINIDE ACETATE 150 MG: 50 TABLET ORAL at 09:03

## 2023-03-29 RX ADMIN — ASPIRIN 81 MG: 81 TABLET, COATED ORAL at 09:03

## 2023-03-29 RX ADMIN — OXYCODONE HYDROCHLORIDE AND ACETAMINOPHEN 1000 MG: 500 TABLET ORAL at 09:03

## 2023-03-29 RX ADMIN — MUPIROCIN: 20 OINTMENT TOPICAL at 09:03

## 2023-03-29 RX ADMIN — APIXABAN 5 MG: 2.5 TABLET, FILM COATED ORAL at 09:03

## 2023-03-29 NOTE — H&P
[facility] - [facility] Surgical Unit  Hospital Medicine  History & Physical    Patient Name: Joey King  MRN: [id number]  Patient Class: IP- Swing  Admission Date: 3/29/2023  Attending Physician: Len Palacios IV, DO   Primary Care Provider: Prashanth Ryan DO         Patient information was obtained from patient, relative(s) and past medical records.     Subjective:     Principal Problem:Weakness    Chief Complaint:   Chief Complaint   Patient presents with    Weakness        HPI: Records from [facility] show - Mr. Joey King is a 83 yo male with PMHx of hypertension,GERD, MARCELO, anemia, afib, recent PE s/p EKOS procedure on eliquis (02/15/23). Presented to ER at [facility] on 03/23/23 with complaints of SOB that began several days prior. Found to have pneumonia. Treated with rocephin and doxycyline, duonebs prn, supplemental o2. Had blood cultures with 1/2 bottle showing growth of staph epi, suspect contaminant. Has afib that is rate controlled. Has hx of PE, is on eliquis. Has MARCELO , uses cpap at . He remains generally weak and is in need of further physical and occupational therapy.  He has been accepted to [facility] for swingbed today- awaiting his arrival.    Mr. King has arrived , accompanied by his daughter Ashley King. He is awake, alert, oriented x 3. Has no complaints other than generalized weakness. O2 sat on room air is 98%. Chest is clear. Heart RRR. Has some bruising to forearms- he states it is from venipunctures. Appetite good. Last BM this morning. Discussed code status and he has chosen full code.       Past Medical History:   Diagnosis Date    Back injury     Hypertension     Thyroid disease        Past Surgical History:   Procedure Laterality Date    SHOULDER SURGERY      THYROID SURGERY         Review of patient's allergies indicates:  No Known Allergies    No current facility-administered medications on file prior to encounter.      Current Outpatient Medications on File Prior to Encounter   Medication Sig    apixaban (ELIQUIS) 5 mg Tab 1 tablet.    ARIPiprazole (ABILIFY) 2 MG Tab Take 2 mg by mouth every evening.    ARIPiprazole (ABILIFY) 5 MG Tab 1 tablet.    aspirin (ECOTRIN) 81 MG EC tablet TAKE ONE TABLET BY MOUTH DAILY TO PREVENT BLOOD CLOT    buPROPion (WELLBUTRIN XL) 150 MG TB24 tablet Take 150 mg by mouth every morning.    calcium carbonate (OS-NOAH) 600 mg calcium (1,500 mg) Tab 1 tablet with meals    citalopram (CELEXA) 40 MG tablet TAKE ONE TABLET BY MOUTH DAILY FOR DEPRESSION    cloNIDine (CATAPRES) 0.1 MG tablet TAKE ONE TABLET BY MOUTH DAILY FOR HIGH BLOOD PRESSURE    clorazepate (TRANXENE) 3.75 MG Tab Take 3.75-7.5 mg by mouth 3 (three) times daily as needed.    cyanocobalamin (VITAMIN B-12) 100 MCG tablet Take 1,000 mcg by mouth.    eszopiclone (LUNESTA) 3 mg Tab Take 3 mg by mouth nightly as needed.    HYDROcodone-acetaminophen (NORCO) 7.5-325 mg per tablet 1 tablet as needed.    levothyroxine (SYNTHROID) 100 MCG tablet TAKE ONE TABLET BY MOUTH DAILY IN THE MORNING FOR THYROID REPLACEMENT    lisinopriL-hydrochlorothiazide (PRINZIDE,ZESTORETIC) 20-12.5 mg per tablet 1 tablet.    magnesium oxide-Mg AA chelate (MAGNESIUM, OXIDE/AA CHELATE,) 300 mg Cap 1 capsule with a meal    melatonin (MELATIN) Take 3 mg by mouth nightly as needed.    methocarbamoL (ROBAXIN) 500 MG Tab 1 tablet.    methylphenidate HCl (RITALIN) 5 MG tablet Take 5 mg by mouth.    mirabegron (MYRBETRIQ) 50 mg Tb24 Take 1 tablet by mouth once daily.    multivit with minerals/lutein (MULTIVITAMIN 50 PLUS ORAL)     mupirocin (BACTROBAN) 2 % ointment 2 (two) times daily.    omega-3 fatty acids-fish oil 340-1,000 mg Cap Take 1 capsule by mouth.    omega-3s/dha/epa/fish oil/D3 (VITAMIN-D + OMEGA-3 ORAL) 1 tablet.    omeprazole (PRILOSEC) 40 MG capsule 40 mg.    oxybutynin (DITROPAN XL) 15 MG TR24 Take 1 tablet by mouth once daily.    pantoprazole (PROTONIX) 40 MG  tablet Take 40 mg by mouth once daily.    POLY HIST FORTE 10.5-10 mg Tab Take 1 tablet by mouth 4 (four) times daily as needed.    polyethylene glycol (GLYCOLAX) 17 gram/dose powder Take 17 g by mouth.    pramipexole (MIRAPEX) 0.125 MG tablet 0.125 mg.    psyllium husk-calcium (METAMUCIL PLUS CALCIUM) 1-60 gram-mg Cap     pyridoxine, vitamin B6, (B-6) 50 MG Tab Take 100 mg by mouth.    pyridoxine, vitamin B6, (VITAMIN B-6) 100 MG Tab 1 tablet.    sertraline (ZOLOFT) 50 MG tablet 1 tablet.    traMADoL (ULTRAM) 50 mg tablet 1 tablet as needed.    venlafaxine (EFFEXOR) 75 MG tablet TAKE ONE TABLET BY MOUTH at bedtime FOR 2 nights, THEN TAKE ONE TABLET TWICE DAILY    vit c-ascorbate Ca-ascorb sod (VITAMIN C) 500 mg/15 mL Liqd 1 tablet.    vitamin D (VITAMIN D3) 1000 units Tab TAKE ONE TABLET BY MOUTH DAILY (CHOLECALCIFEROL SAME AS VITAMIN D)    vitamin E 400 UNIT capsule 1 capsule.    zinc 50 mg Tab 1 tablet.    zinc sulfate (ZINCATE) 50 mg zinc (220 mg) capsule Take 1 capsule by mouth once daily.    zolpidem (AMBIEN) 10 mg Tab Take 10 mg by mouth.     Family History    None       Tobacco Use    Smoking status: Never    Smokeless tobacco: Never   Substance and Sexual Activity    Alcohol use: Never    Drug use: Never    Sexual activity: Not Currently     Review of Systems   Constitutional:  Negative for appetite change and fever.   Respiratory:  Negative for cough, shortness of breath and wheezing.    Cardiovascular:  Negative for chest pain.   Gastrointestinal:  Negative for constipation, diarrhea, nausea and vomiting.        Last bm this morning   Musculoskeletal:  Negative for arthralgias.   Skin:  Negative for wound.   Neurological:  Positive for weakness.   Objective:     Vital Signs (Most Recent):  Temp: 97.7 °F (36.5 °C) (03/29/23 1606)  Pulse: 83 (03/29/23 1606)  Resp: 20 (03/29/23 1606)  BP: 137/77 (03/29/23 1606)  SpO2: 99 % (03/29/23 1606) Vital Signs (24h Range):  Temp:  [97.7 °F (36.5 °C)] 97.7 °F (36.5  °C)  Pulse:  [83] 83  Resp:  [20] 20  SpO2:  [99 %] 99 %  BP: (137)/(77) 137/77     Weight: 98 kg (216 lb)  Body mass index is 30.13 kg/m².    Physical Exam  Constitutional:       Appearance: Normal appearance.   HENT:      Mouth/Throat:      Mouth: Mucous membranes are moist.   Cardiovascular:      Rate and Rhythm: Normal rate and regular rhythm.      Pulses: Normal pulses.      Heart sounds: Normal heart sounds.   Pulmonary:      Effort: Pulmonary effort is normal.      Breath sounds: Normal breath sounds.   Abdominal:      General: Bowel sounds are normal.      Palpations: Abdomen is soft.   Musculoskeletal:         General: Normal range of motion.   Skin:     General: Skin is warm and dry.      Comments: Bruises to forearms from venipunctures   Neurological:      Mental Status: He is alert and oriented to person, place, and time.   Psychiatric:         Mood and Affect: Mood normal.           Significant Labs: All pertinent labs within the past 24 hours have been reviewed.  Recent Lab Results       None            Significant Imaging: I have reviewed all pertinent imaging results/findings within the past 24 hours.    Assessment/Plan:     * Weakness  03/28/23 PT and OT to evaluate and treat       Paroxysmal atrial fibrillation  03/28/23 eliquis 5 mg po BID           Essential hypertension    03/28/23 continue vital every 4 hours   Clonidine 0.1mg at hs     Gastroesophageal reflux disease without esophagitis  03/28/23 protonix 40 mg po daily       Hypothyroidism    03/28/23 continue levothyroxine 100 mcg daily     Vitamin D deficiency    03/28/23 vitamin d 3 daily     MARCELO (obstructive sleep apnea)  03/28/23 CPAP at hs       Moderate major depression, single episode  03/28/23 continue celexa 40 mg po daily         Frequency of urination  03/28/23 myrbetriq and ditropan         Cobalamin deficiency  03/28/23 b 12 daily         VTE Risk Mitigation (From admission, onward)           Ordered     apixaban tablet 5 mg  2  times daily         03/29/23 1601     IP VTE LOW RISK PATIENT  Once         03/29/23 1539                               Len Palacios IV, DO  Department of Hospital Medicine  [facility]

## 2023-03-29 NOTE — NURSING
Patient transferred to [facility] from [facility] per private car with family member - patient alert and oriented - family member present -

## 2023-03-29 NOTE — HPI
Records from [facility] show - Mr. Joey King is a 83 yo male with PMHx of hypertension,GERD, MARCELO, anemia, afib, recent PE s/p EKOS procedure on eliquis (02/15/23). Presented to ER at [facility] on 03/23/23 with complaints of SOB that began several days prior. Found to have pneumonia. Treated with rocephin and doxycyline, duonebs prn, supplemental o2. Had blood cultures with 1/2 bottle showing growth of staph epi, suspect contaminant. Has afib that is rate controlled. Has hx of PE, is on eliquis. Has MARCELO , uses cpap at hs. He remains generally weak and is in need of further physical and occupational therapy.  He has been accepted to [facility] for swingbed today- awaiting his arrival.    Mr. King has arrived , accompanied by his daughter Ashley King. He is awake, alert, oriented x 3. Has no complaints other than generalized weakness. O2 sat on room air is 98%. Chest is clear. Heart RRR. Has some bruising to forearms- he states it is from venipunctures. Appetite good. Last BM this morning. Discussed code status and he has chosen full code.

## 2023-03-29 NOTE — SUBJECTIVE & OBJECTIVE
Past Medical History:   Diagnosis Date    Back injury     Hypertension     Thyroid disease        Past Surgical History:   Procedure Laterality Date    SHOULDER SURGERY      THYROID SURGERY         Review of patient's allergies indicates:  No Known Allergies    No current facility-administered medications on file prior to encounter.     Current Outpatient Medications on File Prior to Encounter   Medication Sig    apixaban (ELIQUIS) 5 mg Tab 1 tablet.    ARIPiprazole (ABILIFY) 2 MG Tab Take 2 mg by mouth every evening.    ARIPiprazole (ABILIFY) 5 MG Tab 1 tablet.    aspirin (ECOTRIN) 81 MG EC tablet TAKE ONE TABLET BY MOUTH DAILY TO PREVENT BLOOD CLOT    buPROPion (WELLBUTRIN XL) 150 MG TB24 tablet Take 150 mg by mouth every morning.    calcium carbonate (OS-NOAH) 600 mg calcium (1,500 mg) Tab 1 tablet with meals    citalopram (CELEXA) 40 MG tablet TAKE ONE TABLET BY MOUTH DAILY FOR DEPRESSION    cloNIDine (CATAPRES) 0.1 MG tablet TAKE ONE TABLET BY MOUTH DAILY FOR HIGH BLOOD PRESSURE    clorazepate (TRANXENE) 3.75 MG Tab Take 3.75-7.5 mg by mouth 3 (three) times daily as needed.    cyanocobalamin (VITAMIN B-12) 100 MCG tablet Take 1,000 mcg by mouth.    eszopiclone (LUNESTA) 3 mg Tab Take 3 mg by mouth nightly as needed.    HYDROcodone-acetaminophen (NORCO) 7.5-325 mg per tablet 1 tablet as needed.    levothyroxine (SYNTHROID) 100 MCG tablet TAKE ONE TABLET BY MOUTH DAILY IN THE MORNING FOR THYROID REPLACEMENT    lisinopriL-hydrochlorothiazide (PRINZIDE,ZESTORETIC) 20-12.5 mg per tablet 1 tablet.    magnesium oxide-Mg AA chelate (MAGNESIUM, OXIDE/AA CHELATE,) 300 mg Cap 1 capsule with a meal    melatonin (MELATIN) Take 3 mg by mouth nightly as needed.    methocarbamoL (ROBAXIN) 500 MG Tab 1 tablet.    methylphenidate HCl (RITALIN) 5 MG tablet Take 5 mg by mouth.    mirabegron (MYRBETRIQ) 50 mg Tb24 Take 1 tablet by mouth once daily.    multivit with minerals/lutein (MULTIVITAMIN 50 PLUS ORAL)     mupirocin  (BACTROBAN) 2 % ointment 2 (two) times daily.    omega-3 fatty acids-fish oil 340-1,000 mg Cap Take 1 capsule by mouth.    omega-3s/dha/epa/fish oil/D3 (VITAMIN-D + OMEGA-3 ORAL) 1 tablet.    omeprazole (PRILOSEC) 40 MG capsule 40 mg.    oxybutynin (DITROPAN XL) 15 MG TR24 Take 1 tablet by mouth once daily.    pantoprazole (PROTONIX) 40 MG tablet Take 40 mg by mouth once daily.    POLY HIST FORTE 10.5-10 mg Tab Take 1 tablet by mouth 4 (four) times daily as needed.    polyethylene glycol (GLYCOLAX) 17 gram/dose powder Take 17 g by mouth.    pramipexole (MIRAPEX) 0.125 MG tablet 0.125 mg.    psyllium husk-calcium (METAMUCIL PLUS CALCIUM) 1-60 gram-mg Cap     pyridoxine, vitamin B6, (B-6) 50 MG Tab Take 100 mg by mouth.    pyridoxine, vitamin B6, (VITAMIN B-6) 100 MG Tab 1 tablet.    sertraline (ZOLOFT) 50 MG tablet 1 tablet.    traMADoL (ULTRAM) 50 mg tablet 1 tablet as needed.    venlafaxine (EFFEXOR) 75 MG tablet TAKE ONE TABLET BY MOUTH at bedtime FOR 2 nights, THEN TAKE ONE TABLET TWICE DAILY    vit c-ascorbate Ca-ascorb sod (VITAMIN C) 500 mg/15 mL Liqd 1 tablet.    vitamin D (VITAMIN D3) 1000 units Tab TAKE ONE TABLET BY MOUTH DAILY (CHOLECALCIFEROL SAME AS VITAMIN D)    vitamin E 400 UNIT capsule 1 capsule.    zinc 50 mg Tab 1 tablet.    zinc sulfate (ZINCATE) 50 mg zinc (220 mg) capsule Take 1 capsule by mouth once daily.    zolpidem (AMBIEN) 10 mg Tab Take 10 mg by mouth.     Family History    None       Tobacco Use    Smoking status: Never    Smokeless tobacco: Never   Substance and Sexual Activity    Alcohol use: Never    Drug use: Never    Sexual activity: Not Currently     Review of Systems   Constitutional:  Negative for appetite change and fever.   Respiratory:  Negative for cough, shortness of breath and wheezing.    Cardiovascular:  Negative for chest pain.   Gastrointestinal:  Negative for constipation, diarrhea, nausea and vomiting.        Last bm this morning   Musculoskeletal:  Negative for  arthralgias.   Skin:  Negative for wound.   Neurological:  Positive for weakness.   Objective:     Vital Signs (Most Recent):  Temp: 97.7 °F (36.5 °C) (03/29/23 1606)  Pulse: 83 (03/29/23 1606)  Resp: 20 (03/29/23 1606)  BP: 137/77 (03/29/23 1606)  SpO2: 99 % (03/29/23 1606) Vital Signs (24h Range):  Temp:  [97.7 °F (36.5 °C)] 97.7 °F (36.5 °C)  Pulse:  [83] 83  Resp:  [20] 20  SpO2:  [99 %] 99 %  BP: (137)/(77) 137/77     Weight: 98 kg (216 lb)  Body mass index is 30.13 kg/m².    Physical Exam  Constitutional:       Appearance: Normal appearance.   HENT:      Mouth/Throat:      Mouth: Mucous membranes are moist.   Cardiovascular:      Rate and Rhythm: Normal rate and regular rhythm.      Pulses: Normal pulses.      Heart sounds: Normal heart sounds.   Pulmonary:      Effort: Pulmonary effort is normal.      Breath sounds: Normal breath sounds.   Abdominal:      General: Bowel sounds are normal.      Palpations: Abdomen is soft.   Musculoskeletal:         General: Normal range of motion.   Skin:     General: Skin is warm and dry.      Comments: Bruises to forearms from venipunctures   Neurological:      Mental Status: He is alert and oriented to person, place, and time.   Psychiatric:         Mood and Affect: Mood normal.           Significant Labs: All pertinent labs within the past 24 hours have been reviewed.  Recent Lab Results       None            Significant Imaging: I have reviewed all pertinent imaging results/findings within the past 24 hours.

## 2023-03-29 NOTE — PLAN OF CARE
Problem: Adult Inpatient Plan of Care  Goal: Plan of Care Review  Outcome: Ongoing, Progressing  Goal: Patient-Specific Goal (Individualized)  Outcome: Ongoing, Progressing  Goal: Absence of Hospital-Acquired Illness or Injury  Outcome: Ongoing, Progressing  Intervention: Identify and Manage Fall Risk  Flowsheets (Taken 3/29/2023 1554)  Safety Promotion/Fall Prevention: assistive device/personal item within reach  Intervention: Prevent Skin Injury  Flowsheets (Taken 3/29/2023 1554)  Skin Protection: adhesive use limited  Intervention: Prevent and Manage VTE (Venous Thromboembolism) Risk  Flowsheets (Taken 3/29/2023 1554)  Activity Management: Rolling - L1  Intervention: Prevent Infection  Flowsheets (Taken 3/29/2023 1554)  Infection Prevention: hand hygiene promoted  Goal: Optimal Comfort and Wellbeing  Outcome: Ongoing, Progressing  Intervention: Monitor Pain and Promote Comfort  Flowsheets (Taken 3/29/2023 1554)  Pain Management Interventions:   care clustered   quiet environment facilitated  Intervention: Provide Person-Centered Care  Flowsheets (Taken 3/29/2023 1554)  Trust Relationship/Rapport: care explained  Goal: Readiness for Transition of Care  Outcome: Ongoing, Progressing  Intervention: Mutually Develop Transition Plan  Flowsheets (Taken 3/29/2023 1554)  Equipment Currently Used at Home: none  Transportation Anticipated: family or friend will provide     Problem: Fall Injury Risk  Goal: Absence of Fall and Fall-Related Injury  Outcome: Ongoing, Progressing  Intervention: Identify and Manage Contributors  Flowsheets (Taken 3/29/2023 1554)  Self-Care Promotion: safe use of adaptive equipment encouraged  Intervention: Promote Injury-Free Environment  Flowsheets (Taken 3/29/2023 1554)  Safety Promotion/Fall Prevention: assistive device/personal item within reach

## 2023-03-30 LAB
ANION GAP SERPL CALCULATED.3IONS-SCNC: 10 MMOL/L (ref 7–16)
BASOPHILS # BLD AUTO: 0.07 K/UL (ref 0–0.2)
BASOPHILS NFR BLD AUTO: 1.3 % (ref 0–1)
BUN SERPL-MCNC: 21 MG/DL (ref 7–18)
BUN/CREAT SERPL: 20 (ref 6–20)
CALCIUM SERPL-MCNC: 8.4 MG/DL (ref 8.5–10.1)
CHLORIDE SERPL-SCNC: 104 MMOL/L (ref 98–107)
CO2 SERPL-SCNC: 27 MMOL/L (ref 21–32)
CREAT SERPL-MCNC: 1.06 MG/DL (ref 0.7–1.3)
DIFFERENTIAL METHOD BLD: ABNORMAL
EGFR (NO RACE VARIABLE) (RUSH/TITUS): 70 ML/MIN/1.73M²
EOSINOPHIL # BLD AUTO: 0.31 K/UL (ref 0–0.5)
EOSINOPHIL NFR BLD AUTO: 5.8 % (ref 1–4)
ERYTHROCYTE [DISTWIDTH] IN BLOOD BY AUTOMATED COUNT: 19.2 % (ref 11.5–14.5)
GLUCOSE SERPL-MCNC: 91 MG/DL (ref 74–106)
HCT VFR BLD AUTO: 27.1 % (ref 40–54)
HGB BLD-MCNC: 8.1 G/DL (ref 13.5–18)
LYMPHOCYTES # BLD AUTO: 0.97 K/UL (ref 1–4.8)
LYMPHOCYTES NFR BLD AUTO: 18 % (ref 27–41)
MAGNESIUM SERPL-MCNC: 1.9 MG/DL (ref 1.7–2.3)
MCH RBC QN AUTO: 23 PG (ref 27–31)
MCHC RBC AUTO-ENTMCNC: 29.9 G/DL (ref 32–36)
MCV RBC AUTO: 77 FL (ref 80–96)
MONOCYTES # BLD AUTO: 0.78 K/UL (ref 0–0.8)
MONOCYTES NFR BLD AUTO: 14.5 % (ref 2–6)
MPC BLD CALC-MCNC: 9 FL (ref 9.4–12.4)
NEUTROPHILS # BLD AUTO: 3.25 K/UL (ref 1.8–7.7)
NEUTROPHILS NFR BLD AUTO: 60.4 % (ref 53–65)
PHOSPHATE SERPL-MCNC: 3.9 MG/DL (ref 2.5–4.5)
PLATELET # BLD AUTO: 272 K/UL (ref 150–400)
POTASSIUM SERPL-SCNC: 3.5 MMOL/L (ref 3.5–5.1)
RBC # BLD AUTO: 3.52 M/UL (ref 4.6–6.2)
SODIUM SERPL-SCNC: 137 MMOL/L (ref 136–145)
WBC # BLD AUTO: 5.38 K/UL (ref 4.5–11)

## 2023-03-30 PROCEDURE — 25000003 PHARM REV CODE 250: Performed by: FAMILY MEDICINE

## 2023-03-30 PROCEDURE — 80048 BASIC METABOLIC PNL TOTAL CA: CPT | Performed by: NURSE PRACTITIONER

## 2023-03-30 PROCEDURE — 25000003 PHARM REV CODE 250: Performed by: NURSE PRACTITIONER

## 2023-03-30 PROCEDURE — 97161 PT EVAL LOW COMPLEX 20 MIN: CPT

## 2023-03-30 PROCEDURE — 94761 N-INVAS EAR/PLS OXIMETRY MLT: CPT

## 2023-03-30 PROCEDURE — 85025 COMPLETE CBC W/AUTO DIFF WBC: CPT | Performed by: NURSE PRACTITIONER

## 2023-03-30 PROCEDURE — 84100 ASSAY OF PHOSPHORUS: CPT | Performed by: NURSE PRACTITIONER

## 2023-03-30 PROCEDURE — 83735 ASSAY OF MAGNESIUM: CPT | Performed by: NURSE PRACTITIONER

## 2023-03-30 PROCEDURE — 11000004 HC SNF PRIVATE

## 2023-03-30 PROCEDURE — 27000944

## 2023-03-30 RX ORDER — LANOLIN ALCOHOL/MO/W.PET/CERES
100 CREAM (GRAM) TOPICAL DAILY
Status: DISCONTINUED | OUTPATIENT
Start: 2023-03-31 | End: 2023-04-06 | Stop reason: HOSPADM

## 2023-03-30 RX ADMIN — ASPIRIN 81 MG: 81 TABLET, COATED ORAL at 08:03

## 2023-03-30 RX ADMIN — FLECAINIDE ACETATE 150 MG: 50 TABLET ORAL at 08:03

## 2023-03-30 RX ADMIN — SENNOSIDES AND DOCUSATE SODIUM 1 TABLET: 50; 8.6 TABLET ORAL at 08:03

## 2023-03-30 RX ADMIN — FLECAINIDE ACETATE 150 MG: 50 TABLET ORAL at 09:03

## 2023-03-30 RX ADMIN — OXYBUTYNIN CHLORIDE 15 MG: 5 TABLET, EXTENDED RELEASE ORAL at 09:03

## 2023-03-30 RX ADMIN — OMEGA-3 FATTY ACIDS CAP 1000 MG 1 CAPSULE: 1000 CAP at 09:03

## 2023-03-30 RX ADMIN — LEVOTHYROXINE SODIUM 100 MCG: 100 TABLET ORAL at 05:03

## 2023-03-30 RX ADMIN — CITALOPRAM HYDROBROMIDE 40 MG: 20 TABLET ORAL at 09:03

## 2023-03-30 RX ADMIN — APIXABAN 5 MG: 2.5 TABLET, FILM COATED ORAL at 08:03

## 2023-03-30 RX ADMIN — PANTOPRAZOLE SODIUM 40 MG: 40 TABLET, DELAYED RELEASE ORAL at 09:03

## 2023-03-30 RX ADMIN — MUPIROCIN: 20 OINTMENT TOPICAL at 09:03

## 2023-03-30 RX ADMIN — FERROUS SULFATE TAB 325 MG (65 MG ELEMENTAL FE) 1 EACH: 325 (65 FE) TAB at 09:03

## 2023-03-30 RX ADMIN — CYANOCOBALAMIN TAB 500 MCG 1000 MCG: 500 TAB at 09:03

## 2023-03-30 RX ADMIN — OXYCODONE HYDROCHLORIDE AND ACETAMINOPHEN 1000 MG: 500 TABLET ORAL at 09:03

## 2023-03-30 RX ADMIN — CHOLECALCIFEROL TAB 25 MCG (1000 UNIT) 1000 UNITS: 25 TAB at 09:03

## 2023-03-30 RX ADMIN — ZINC SULFATE 220 MG (50 MG) CAPSULE 220 MG: CAPSULE at 09:03

## 2023-03-30 RX ADMIN — OXYCODONE HYDROCHLORIDE AND ACETAMINOPHEN 1000 MG: 500 TABLET ORAL at 08:03

## 2023-03-30 RX ADMIN — ARIPIPRAZOLE 5 MG: 5 TABLET ORAL at 09:03

## 2023-03-30 RX ADMIN — APIXABAN 5 MG: 2.5 TABLET, FILM COATED ORAL at 09:03

## 2023-03-30 RX ADMIN — MUPIROCIN: 20 OINTMENT TOPICAL at 08:03

## 2023-03-30 RX ADMIN — CLONIDINE HYDROCHLORIDE 0.1 MG: 0.1 TABLET ORAL at 08:03

## 2023-03-30 RX ADMIN — SENNOSIDES AND DOCUSATE SODIUM 1 TABLET: 50; 8.6 TABLET ORAL at 09:03

## 2023-03-30 NOTE — RESPIRATORY THERAPY
**Late entry: On 3/29/23 Pt states that he wears CPAP at home sometimes but refused to wear it here.

## 2023-03-30 NOTE — PROGRESS NOTES
[facility]  Initial Discharge Assessment       Primary Care Provider: Prashanth Ryan DO    Admission Diagnosis: Weakness generalized [R53.1]    Admission Date: 3/29/2023  Expected Discharge Date:     Discharge Barriers Identified: (P) None    Payor: MEDICARE / Plan: MEDICARE PART A & B / Product Type: Government /     Extended Emergency Contact Information  Primary Emergency Contact: Sheron King  Address: [address] [place], [place] [address] [facility]  Home Phone: [phone]  Mobile Phone: [phone]  Relation: Spouse  Preferred language: English   needed? No  Secondary Emergency Contact: Ashley King  Mobile Phone: [phone]  Relation: Daughter  Preferred language: English   needed? No    Discharge Plan A: (P) Home, Home with family  Discharge Plan B: (P) Home Health      [facility] [address] [place] [address]  [address] [place] [address]  Phone: [phone] Fax: [phone]    [address] [id number] [address]  Phone: [phone] Fax: [phone]      Initial Assessment (most recent)       Adult Discharge Assessment - 03/30/23 1055          Discharge Assessment    Assessment Type Discharge Planning Assessment (P)      Confirmed/corrected address, phone number and insurance Yes (P)      Confirmed Demographics Correct on Facesheet (P)      Source of Information patient;family (P)      People in Home alone (P)      Do you expect to return to your current living situation? Yes (P)      Do you have help at home or someone to help you manage your care at home? Yes (P)      Who are your caregiver(s) and their phone number(s)? Ashley Salinas,daughter, and different family members come by when daughter is not home (she lives out of town.) (P)      Prior to hospitilization cognitive status: Alert/Oriented (P)      Current  cognitive status: Alert/Oriented (P)      Equipment Currently Used at Home none (P)      Do you currently have service(s) that help you manage your care at home? Yes (P)    daughter doesn't know the name of the agency that comes and check his blood pressure [place] 3x/wk    Is the pt/caregiver preference to resume services with current agency Yes (P)      Discharge Plan A Home;Home with family (P)      Discharge Plan B Home Health (P)      DME Needed Upon Discharge  none (P)      Discharge Plan discussed with: Patient;Adult children (P)      Discharge Barriers Identified None (P)                    Spoke with patient and his daughter, Ashley.  Ashley answered most questions w/Mr. King lying in bed listening.  They both stated that Mr. King lives alone with different family members stopping by now and again but she's wanting Mr. King to come and stay with her r/t she lives out of town.  Mr. King does not have or use any DME nor have any type of home health services.

## 2023-03-30 NOTE — PLAN OF CARE
Problem: Physical Therapy  Goal: Physical Therapy Goal  Description: Short-term Goals: 0.5 week  1. Patient will perform supine to/from sit with modified independence to improve independence and safety with bed mobility.  2. Patient will perform sit to/from stand without assistive device with supervision to improve independence and safety with transfers.  3. Patient will ambulate 100 feet without assistive device with standby assist to improve independence and safety with gait.  4. Patient will tolerate 15 minutes of physical therapy intervention to improve endurance and activity tolerance.    Long-term goals: 1 week  1. Patient will perform supine to/from sit with independence to improve independence and safety with bed mobility.  2. Patient will perform sit to/from stand without assistive device with independence to improve independence and safety with transfers.  3. Patient will ambulate 100 feet without assistive device with independence to improve independence and safety with gait.  4. Patient will tolerate 30 minutes of physical therapy intervention to improve endurance and activity tolerance.    Outcome: Ongoing, Progressing     Flavio Aguilar, PT, DPT

## 2023-03-31 PROCEDURE — 99900035 HC TECH TIME PER 15 MIN (STAT)

## 2023-03-31 PROCEDURE — 25000003 PHARM REV CODE 250: Performed by: FAMILY MEDICINE

## 2023-03-31 PROCEDURE — 11000004 HC SNF PRIVATE

## 2023-03-31 PROCEDURE — 27000944

## 2023-03-31 PROCEDURE — 97116 GAIT TRAINING THERAPY: CPT | Mod: CQ

## 2023-03-31 PROCEDURE — 97166 OT EVAL MOD COMPLEX 45 MIN: CPT

## 2023-03-31 PROCEDURE — 25000003 PHARM REV CODE 250: Performed by: NURSE PRACTITIONER

## 2023-03-31 PROCEDURE — 94761 N-INVAS EAR/PLS OXIMETRY MLT: CPT

## 2023-03-31 RX ORDER — IPRATROPIUM BROMIDE AND ALBUTEROL SULFATE 2.5; .5 MG/3ML; MG/3ML
3 SOLUTION RESPIRATORY (INHALATION) EVERY 4 HOURS PRN
Status: DISCONTINUED | OUTPATIENT
Start: 2023-03-31 | End: 2023-04-06 | Stop reason: HOSPADM

## 2023-03-31 RX ORDER — VITAMIN E MIXED 400 UNIT
400 CAPSULE ORAL DAILY
Status: DISCONTINUED | OUTPATIENT
Start: 2023-04-01 | End: 2023-04-06 | Stop reason: HOSPADM

## 2023-03-31 RX ADMIN — ARIPIPRAZOLE 5 MG: 5 TABLET ORAL at 09:03

## 2023-03-31 RX ADMIN — MUPIROCIN: 20 OINTMENT TOPICAL at 09:03

## 2023-03-31 RX ADMIN — ASPIRIN 81 MG: 81 TABLET, COATED ORAL at 09:03

## 2023-03-31 RX ADMIN — LEVOTHYROXINE SODIUM 100 MCG: 100 TABLET ORAL at 05:03

## 2023-03-31 RX ADMIN — FERROUS SULFATE TAB 325 MG (65 MG ELEMENTAL FE) 1 EACH: 325 (65 FE) TAB at 09:03

## 2023-03-31 RX ADMIN — CYANOCOBALAMIN TAB 500 MCG 1000 MCG: 500 TAB at 09:03

## 2023-03-31 RX ADMIN — OXYCODONE HYDROCHLORIDE AND ACETAMINOPHEN 1000 MG: 500 TABLET ORAL at 09:03

## 2023-03-31 RX ADMIN — ZINC SULFATE 220 MG (50 MG) CAPSULE 220 MG: CAPSULE at 09:03

## 2023-03-31 RX ADMIN — SENNOSIDES AND DOCUSATE SODIUM 1 TABLET: 50; 8.6 TABLET ORAL at 09:03

## 2023-03-31 RX ADMIN — APIXABAN 5 MG: 2.5 TABLET, FILM COATED ORAL at 09:03

## 2023-03-31 RX ADMIN — FLECAINIDE ACETATE 150 MG: 50 TABLET ORAL at 09:03

## 2023-03-31 RX ADMIN — CITALOPRAM HYDROBROMIDE 40 MG: 20 TABLET ORAL at 09:03

## 2023-03-31 RX ADMIN — CHOLECALCIFEROL TAB 25 MCG (1000 UNIT) 1000 UNITS: 25 TAB at 09:03

## 2023-03-31 RX ADMIN — Medication 100 MG: at 01:03

## 2023-03-31 RX ADMIN — OXYBUTYNIN CHLORIDE 15 MG: 5 TABLET, EXTENDED RELEASE ORAL at 09:03

## 2023-03-31 RX ADMIN — CLONIDINE HYDROCHLORIDE 0.1 MG: 0.1 TABLET ORAL at 09:03

## 2023-03-31 RX ADMIN — OMEGA-3 FATTY ACIDS CAP 1000 MG 1 CAPSULE: 1000 CAP at 09:03

## 2023-03-31 RX ADMIN — PANTOPRAZOLE SODIUM 40 MG: 40 TABLET, DELAYED RELEASE ORAL at 09:03

## 2023-03-31 NOTE — PT/OT/SLP PROGRESS
Physical Therapy Treatment    Patient Name:  Joey King   MRN:  [id number]    Recommendations:     Discharge Recommendations: home health PT  Discharge Equipment Recommendations: walker, rolling  Barriers to discharge: None    Assessment:     Joey King is a 82 y.o. male admitted with a medical diagnosis of Weakness.  He presents with the following impairments/functional limitations: impaired endurance .    Rehab Prognosis: Good; patient would benefit from acute skilled PT services to address these deficits and reach maximum level of function.    Recent Surgery: * No surgery found *      Received Plan of Care per Flavio Zuñiga PT     Plan:     During this hospitalization, patient to be seen 5 x/week to address the identified rehab impairments via gait training, therapeutic activities, therapeutic exercises, neuromuscular re-education and progress toward the following goals:    Plan of Care Expires:  04/06/23    Subjective     Chief Complaint: none  Patient/Family Comments/goals: agrees to PT Tx   Pain/Comfort:  Pain Rating 1: 0/10      Objective:     Communicated with patient prior to session.  Patient found  sitting on couch    upon PT entry to room.     General Precautions: Standard, fall  Orthopedic Precautions: N/A  Braces: N/A  Respiratory Status: Room air     Functional Mobility:  Transfers:     Sit to Stand:  modified independence with no AD  Gait: ~200ft with rolling walker, CGA, VC's for posture correction; pt a little impulsive; up/down 4 steps with one railing and CGA/Rory; vc's for safety      AM-PAC 6 CLICK MOBILITY          Treatment & Education:  After gait as above, pt fatigued so he was educated/instructed in BLE exercises in standing to do over the weekend: marching, mini squats, heel/toe raises, hip abduction x 10-15 repetitions at a time, can do 2 sets when he is able, to do 2-3x per day; pt may walk with daughter or CNA over the weekend; he agrees     Patient left  sitting  on couch  with  daughter present..    GOALS:   Multidisciplinary Problems       Physical Therapy Goals          Problem: Physical Therapy    Goal Priority Disciplines Outcome Goal Variances Interventions   Physical Therapy Goal     PT, PT/OT Ongoing, Progressing     Description: Short-term Goals: 0.5 week  1. Patient will perform supine to/from sit with modified independence to improve independence and safety with bed mobility.  2. Patient will perform sit to/from stand without assistive device with supervision to improve independence and safety with transfers.  3. Patient will ambulate 100 feet without assistive device with standby assist to improve independence and safety with gait.  4. Patient will tolerate 15 minutes of physical therapy intervention to improve endurance and activity tolerance.    Long-term goals: 1 week  1. Patient will perform supine to/from sit with independence to improve independence and safety with bed mobility.  2. Patient will perform sit to/from stand without assistive device with independence to improve independence and safety with transfers.  3. Patient will ambulate 100 feet without assistive device with independence to improve independence and safety with gait.  4. Patient will tolerate 30 minutes of physical therapy intervention to improve endurance and activity tolerance.                         Time Tracking:     PT Received On: 03/31/23  PT Start Time: 1330     PT Stop Time: 1345  PT Total Time (min): 15 min     Billable Minutes: Gait Training 15    Treatment Type: Treatment  PT/PTA: PTA     Number of PTA visits since last PT visit: 1 03/31/2023

## 2023-03-31 NOTE — PT/OT/SLP EVAL
Physical Therapy Evaluation    Patient Name:  Joey King   MRN:  [id number]    Recommendations:     Discharge Recommendations: home health PT   Discharge Equipment Recommendations: walker, rolling   Barriers to discharge: Decreased caregiver support    Assessment:     Joey King is a 82 y.o. male admitted with a medical diagnosis of Weakness. Patient presented to the emergency department at [facility] on 3/23/2023 with shortness of breath x a few days. Patient was transferred to [facility] for swingbed services prior to discharging home. He presents with the following impairments/functional limitations: impaired endurance, impaired functional mobility, gait instability, impaired balance, decreased lower extremity function, decreased safety awareness.    Rehab Prognosis: Good; patient would benefit from acute skilled PT services to address these deficits and reach maximum level of function.    Recent Surgery: * No surgery found *      Plan:     During this hospitalization, patient to be seen 5 x/week to address the identified rehab impairments via gait training, therapeutic activities, therapeutic exercises, neuromuscular re-education and progress toward the following goals:    Plan of Care Expires:  04/06/23    Subjective     Chief Complaint: shortness of breath  Patient/Family Comments/goals: Patient reports he wishes to discharge home independently or home with his daughter in [place], [place].  Pain/Comfort:  0/10    Patients cultural, spiritual, Yarsanism conflicts given the current situation: yes    Living Environment:  Prior to admission, patients level of function was independent with all functional mobility without assistive device. Patient lives alone and has 5 steps with bilateral handrails to enter his home. Equipment used at home: none.  DME owned (not currently used): none.  Upon discharge, patient will have assistance from his daughter if he  discharges home with her.    Objective:     Communicated with nurse prior to session. Patient found supine upon PT entry to room.    General Precautions: Standard, fall  Orthopedic Precautions:N/A   Braces: N/A  Respiratory Status: Room air    Exams:  Gross Motor Coordination:  decreased coordination in bilateral lower extremities with gait  Postural Exam:  Patient presented with the following abnormalities:    -       Rounded shoulders  -       Abnormal trunk flexion  RLE ROM: WFL  RLE Strength: WFL  LLE ROM: WFL  LLE Strength: WFL    Functional Mobility:  Bed Mobility:     Supine to Sit: stand by assistance  Transfers:     Sit to Stand:  stand by assistance with no AD  Gait: x 60 feet with contact guard assist; axial flexion; very narrow base of support; sustained bilateral knee flexion throughout gait cycle; dyspnea on exertion  Balance: standby assist with static standing balance without assistive device; contact guard assist with dynamic standing balance without assistive device    Treatment & Education:    Bed mobility, transfers, and gait performed as listed above. Patient educated on the physical therapy plan of care.    Patient left sitting edge of bed with call button in reach.    GOALS:   Multidisciplinary Problems       Physical Therapy Goals          Problem: Physical Therapy    Goal Priority Disciplines Outcome Goal Variances Interventions   Physical Therapy Goal     PT, PT/OT Ongoing, Progressing     Description: Short-term Goals: 0.5 week  1. Patient will perform supine to/from sit with modified independence to improve independence and safety with bed mobility.  2. Patient will perform sit to/from stand without assistive device with supervision to improve independence and safety with transfers.  3. Patient will ambulate 100 feet without assistive device with standby assist to improve independence and safety with gait.  4. Patient will tolerate 15 minutes of physical therapy intervention to improve  endurance and activity tolerance.    Long-term goals: 1 week  1. Patient will perform supine to/from sit with independence to improve independence and safety with bed mobility.  2. Patient will perform sit to/from stand without assistive device with independence to improve independence and safety with transfers.  3. Patient will ambulate 100 feet without assistive device with independence to improve independence and safety with gait.  4. Patient will tolerate 30 minutes of physical therapy intervention to improve endurance and activity tolerance.                       History:     Past Medical History:   Diagnosis Date    Back injury     Hypertension     Thyroid disease      Past Surgical History:   Procedure Laterality Date    SHOULDER SURGERY      THYROID SURGERY       Time Tracking:     PT Received On: 03/30/23  PT Start Time: 1257     PT Stop Time: 1307  PT Total Time (min): 10 min     Billable Minutes: Evaluation (low complexity; 10 minutes)      03/31/2023

## 2023-03-31 NOTE — PT/OT/SLP EVAL
Occupational Therapy   Evaluation    Name: Joey King  MRN: [id number]  Admitting Diagnosis: Weakness  Recent Surgery: * No surgery found *      Recommendations:     Discharge Recommendations:    Discharge Equipment Recommendations:     Barriers to discharge:       Assessment:     Joey King is a 82 y.o. male with a medical diagnosis of Weakness.  He presents with admission to [facility] swing bed following hospitalization d/t recent PNA. OT performed evaluation only d/t pt perfomring all asepcts of self care without assistance. Pt reports he is at baseline for ADL performance. No skilled OT services indicated at this time. Pt's greatest limitation at this time is ambulation distance/balance.        Plan:     OT evaluation only; no skilled OT services indicated at this time.     Subjective     Chief Complaint: Loss of balance with ambulation.   Patient/Family Comments/goals: Return home.     Occupational Profile:  Living Environment: Pt lives alone in 1 level home, 5-6 steps to enter with bilateral hand railing.   Previous level of function: Arya BADLs  Roles and Routines: Arya light IADLs; works as a [employer]   Equipment Used at Home: none  Assistance upon Discharge: Pt plans to return home alone if possible; otherwise, pt will live with daughter in [place], [place].     Pain/Comfort:   None reported.     Patients cultural, spiritual, Amish conflicts given the current situation: no    Objective:     Communicated with: Nurse prior to session.  Patient found supine with   upon OT entry to room.    General Precautions: Standard,    Orthopedic Precautions:    Braces:    Respiratory Status: Room air    Occupational Performance:    Bed Mobility:    Patient completed Supine to Sit with modified independence  Patient completed Sit to Supine with modified independence    Functional Mobility/Transfers:  Patient completed Sit <> Stand Transfer with stand by assistance  with  no assistive  device   Functional Mobility: x 60 feet with CGA, no DME    Activities of Daily Living:  Lower Body Dressing: modified independence sitting/standing  Toileting: modified independence in bathroom    Cognitive/Visual Perceptual:  Cognitive/Psychosocial Skills:     -       Oriented to: Person, Place, Time, and Situation   -       Follows Commands/attention:Follows multistep  commands    Physical Exam:  Upper Extremity Range of Motion:     -       Right Upper Extremity: WFL  -       Left Upper Extremity: WFL  Upper Extremity Strength:    -       Right Upper Extremity: WFL  -       Left Upper Extremity: WFL    AMPAC 6 Click ADL:  AMPAC Total Score:  24    Patient left supine with call button in reach    GOALS:   Multidisciplinary Problems       Occupational Therapy Goals       Not on file                    History:     Past Medical History:   Diagnosis Date    Back injury     Hypertension     Thyroid disease          Past Surgical History:   Procedure Laterality Date    SHOULDER SURGERY      THYROID SURGERY         Time Tracking:     OT Date of Treatment: 03/30/23  OT Start Time: 1257  OT Stop Time: 1307  OT Total Time (min): 10 min    Billable Minutes:Evaluation 10    RAMONA Gallegos, OTR/L    3/30/2023

## 2023-03-31 NOTE — PROGRESS NOTES
"[facility] - Medical Surgical Unit  Discharge Assessment    Spoke with  King, Mr. King states, "I'm going home to my house when I go home."  This has been his initial plan.            "

## 2023-04-01 LAB — UA COMPLETE W REFLEX CULTURE PNL UR: ABNORMAL

## 2023-04-01 PROCEDURE — 11000004 HC SNF PRIVATE

## 2023-04-01 PROCEDURE — 25000003 PHARM REV CODE 250: Performed by: NURSE PRACTITIONER

## 2023-04-01 PROCEDURE — 99900035 HC TECH TIME PER 15 MIN (STAT)

## 2023-04-01 PROCEDURE — 94761 N-INVAS EAR/PLS OXIMETRY MLT: CPT

## 2023-04-01 PROCEDURE — 25000003 PHARM REV CODE 250: Performed by: FAMILY MEDICINE

## 2023-04-01 RX ADMIN — CITALOPRAM HYDROBROMIDE 40 MG: 20 TABLET ORAL at 09:04

## 2023-04-01 RX ADMIN — APIXABAN 5 MG: 2.5 TABLET, FILM COATED ORAL at 08:04

## 2023-04-01 RX ADMIN — PANTOPRAZOLE SODIUM 40 MG: 40 TABLET, DELAYED RELEASE ORAL at 09:04

## 2023-04-01 RX ADMIN — FLECAINIDE ACETATE 150 MG: 50 TABLET ORAL at 09:04

## 2023-04-01 RX ADMIN — CHOLECALCIFEROL TAB 25 MCG (1000 UNIT) 1000 UNITS: 25 TAB at 09:04

## 2023-04-01 RX ADMIN — MUPIROCIN: 20 OINTMENT TOPICAL at 09:04

## 2023-04-01 RX ADMIN — OXYCODONE HYDROCHLORIDE AND ACETAMINOPHEN 1000 MG: 500 TABLET ORAL at 09:04

## 2023-04-01 RX ADMIN — Medication 100 MG: at 09:04

## 2023-04-01 RX ADMIN — ZINC SULFATE 220 MG (50 MG) CAPSULE 220 MG: CAPSULE at 09:04

## 2023-04-01 RX ADMIN — OXYCODONE HYDROCHLORIDE AND ACETAMINOPHEN 1000 MG: 500 TABLET ORAL at 08:04

## 2023-04-01 RX ADMIN — ARIPIPRAZOLE 5 MG: 5 TABLET ORAL at 09:04

## 2023-04-01 RX ADMIN — APIXABAN 5 MG: 2.5 TABLET, FILM COATED ORAL at 09:04

## 2023-04-01 RX ADMIN — SENNOSIDES AND DOCUSATE SODIUM 1 TABLET: 50; 8.6 TABLET ORAL at 08:04

## 2023-04-01 RX ADMIN — SENNOSIDES AND DOCUSATE SODIUM 1 TABLET: 50; 8.6 TABLET ORAL at 09:04

## 2023-04-01 RX ADMIN — OMEGA-3 FATTY ACIDS CAP 1000 MG 1 CAPSULE: 1000 CAP at 09:04

## 2023-04-01 RX ADMIN — CLONIDINE HYDROCHLORIDE 0.1 MG: 0.1 TABLET ORAL at 08:04

## 2023-04-01 RX ADMIN — ASPIRIN 81 MG: 81 TABLET, COATED ORAL at 08:04

## 2023-04-01 RX ADMIN — OXYBUTYNIN CHLORIDE 15 MG: 5 TABLET, EXTENDED RELEASE ORAL at 09:04

## 2023-04-01 RX ADMIN — FLECAINIDE ACETATE 150 MG: 50 TABLET ORAL at 08:04

## 2023-04-01 RX ADMIN — MUPIROCIN: 20 OINTMENT TOPICAL at 08:04

## 2023-04-01 RX ADMIN — CYANOCOBALAMIN TAB 500 MCG 1000 MCG: 500 TAB at 09:04

## 2023-04-01 RX ADMIN — Medication 400 UNITS: at 09:04

## 2023-04-01 RX ADMIN — FERROUS SULFATE TAB 325 MG (65 MG ELEMENTAL FE) 1 EACH: 325 (65 FE) TAB at 09:04

## 2023-04-01 RX ADMIN — LEVOTHYROXINE SODIUM 100 MCG: 100 TABLET ORAL at 06:04

## 2023-04-02 PROCEDURE — 25000003 PHARM REV CODE 250: Performed by: FAMILY MEDICINE

## 2023-04-02 PROCEDURE — 11000004 HC SNF PRIVATE

## 2023-04-02 PROCEDURE — 25000003 PHARM REV CODE 250: Performed by: NURSE PRACTITIONER

## 2023-04-02 PROCEDURE — 27000944

## 2023-04-02 PROCEDURE — 99900035 HC TECH TIME PER 15 MIN (STAT)

## 2023-04-02 PROCEDURE — 94761 N-INVAS EAR/PLS OXIMETRY MLT: CPT

## 2023-04-02 RX ADMIN — FLECAINIDE ACETATE 150 MG: 50 TABLET ORAL at 08:04

## 2023-04-02 RX ADMIN — OXYBUTYNIN CHLORIDE 15 MG: 5 TABLET, EXTENDED RELEASE ORAL at 09:04

## 2023-04-02 RX ADMIN — Medication 400 UNITS: at 09:04

## 2023-04-02 RX ADMIN — APIXABAN 5 MG: 2.5 TABLET, FILM COATED ORAL at 09:04

## 2023-04-02 RX ADMIN — FERROUS SULFATE TAB 325 MG (65 MG ELEMENTAL FE) 1 EACH: 325 (65 FE) TAB at 09:04

## 2023-04-02 RX ADMIN — FLECAINIDE ACETATE 150 MG: 50 TABLET ORAL at 09:04

## 2023-04-02 RX ADMIN — OMEGA-3 FATTY ACIDS CAP 1000 MG 1 CAPSULE: 1000 CAP at 09:04

## 2023-04-02 RX ADMIN — CITALOPRAM HYDROBROMIDE 40 MG: 20 TABLET ORAL at 09:04

## 2023-04-02 RX ADMIN — CLONIDINE HYDROCHLORIDE 0.1 MG: 0.1 TABLET ORAL at 08:04

## 2023-04-02 RX ADMIN — PANTOPRAZOLE SODIUM 40 MG: 40 TABLET, DELAYED RELEASE ORAL at 09:04

## 2023-04-02 RX ADMIN — Medication 100 MG: at 09:04

## 2023-04-02 RX ADMIN — CHOLECALCIFEROL TAB 25 MCG (1000 UNIT) 1000 UNITS: 25 TAB at 09:04

## 2023-04-02 RX ADMIN — LEVOTHYROXINE SODIUM 100 MCG: 100 TABLET ORAL at 05:04

## 2023-04-02 RX ADMIN — SENNOSIDES AND DOCUSATE SODIUM 1 TABLET: 50; 8.6 TABLET ORAL at 08:04

## 2023-04-02 RX ADMIN — SENNOSIDES AND DOCUSATE SODIUM 1 TABLET: 50; 8.6 TABLET ORAL at 09:04

## 2023-04-02 RX ADMIN — ZINC SULFATE 220 MG (50 MG) CAPSULE 220 MG: CAPSULE at 09:04

## 2023-04-02 RX ADMIN — OXYCODONE HYDROCHLORIDE AND ACETAMINOPHEN 1000 MG: 500 TABLET ORAL at 08:04

## 2023-04-02 RX ADMIN — ASPIRIN 81 MG: 81 TABLET, COATED ORAL at 08:04

## 2023-04-02 RX ADMIN — MUPIROCIN: 20 OINTMENT TOPICAL at 09:04

## 2023-04-02 RX ADMIN — CYANOCOBALAMIN TAB 500 MCG 1000 MCG: 500 TAB at 09:04

## 2023-04-02 RX ADMIN — ARIPIPRAZOLE 5 MG: 5 TABLET ORAL at 09:04

## 2023-04-02 RX ADMIN — OXYCODONE HYDROCHLORIDE AND ACETAMINOPHEN 1000 MG: 500 TABLET ORAL at 09:04

## 2023-04-02 RX ADMIN — APIXABAN 5 MG: 2.5 TABLET, FILM COATED ORAL at 08:04

## 2023-04-02 RX ADMIN — MUPIROCIN: 20 OINTMENT TOPICAL at 08:04

## 2023-04-03 LAB
ANION GAP SERPL CALCULATED.3IONS-SCNC: 11 MMOL/L (ref 7–16)
BASOPHILS # BLD AUTO: 0.04 K/UL (ref 0–0.2)
BASOPHILS NFR BLD AUTO: 0.8 % (ref 0–1)
BUN SERPL-MCNC: 14 MG/DL (ref 7–18)
BUN/CREAT SERPL: 13 (ref 6–20)
CALCIUM SERPL-MCNC: 8.4 MG/DL (ref 8.5–10.1)
CHLORIDE SERPL-SCNC: 107 MMOL/L (ref 98–107)
CO2 SERPL-SCNC: 28 MMOL/L (ref 21–32)
CREAT SERPL-MCNC: 1.11 MG/DL (ref 0.7–1.3)
DIFFERENTIAL METHOD BLD: ABNORMAL
EGFR (NO RACE VARIABLE) (RUSH/TITUS): 66 ML/MIN/1.73M²
EOSINOPHIL # BLD AUTO: 0.23 K/UL (ref 0–0.5)
EOSINOPHIL NFR BLD AUTO: 4.6 % (ref 1–4)
ERYTHROCYTE [DISTWIDTH] IN BLOOD BY AUTOMATED COUNT: 20.2 % (ref 11.5–14.5)
GLUCOSE SERPL-MCNC: 97 MG/DL (ref 74–106)
HCT VFR BLD AUTO: 26.2 % (ref 40–54)
HGB BLD-MCNC: 7.7 G/DL (ref 13.5–18)
LYMPHOCYTES # BLD AUTO: 1 K/UL (ref 1–4.8)
LYMPHOCYTES NFR BLD AUTO: 20.2 % (ref 27–41)
MAGNESIUM SERPL-MCNC: 1.9 MG/DL (ref 1.7–2.3)
MCH RBC QN AUTO: 23 PG (ref 27–31)
MCHC RBC AUTO-ENTMCNC: 29.4 G/DL (ref 32–36)
MCV RBC AUTO: 78.2 FL (ref 80–96)
MONOCYTES # BLD AUTO: 0.81 K/UL (ref 0–0.8)
MONOCYTES NFR BLD AUTO: 16.3 % (ref 2–6)
MPC BLD CALC-MCNC: 9.2 FL (ref 9.4–12.4)
NEUTROPHILS # BLD AUTO: 2.88 K/UL (ref 1.8–7.7)
NEUTROPHILS NFR BLD AUTO: 58.1 % (ref 53–65)
PHOSPHATE SERPL-MCNC: 3.8 MG/DL (ref 2.5–4.5)
PLATELET # BLD AUTO: 281 K/UL (ref 150–400)
POTASSIUM SERPL-SCNC: 3.8 MMOL/L (ref 3.5–5.1)
RBC # BLD AUTO: 3.35 M/UL (ref 4.6–6.2)
SODIUM SERPL-SCNC: 142 MMOL/L (ref 136–145)
WBC # BLD AUTO: 4.96 K/UL (ref 4.5–11)

## 2023-04-03 PROCEDURE — 83735 ASSAY OF MAGNESIUM: CPT | Performed by: NURSE PRACTITIONER

## 2023-04-03 PROCEDURE — 99900035 HC TECH TIME PER 15 MIN (STAT)

## 2023-04-03 PROCEDURE — 27000944

## 2023-04-03 PROCEDURE — 97110 THERAPEUTIC EXERCISES: CPT | Mod: CQ

## 2023-04-03 PROCEDURE — 11000004 HC SNF PRIVATE

## 2023-04-03 PROCEDURE — 94761 N-INVAS EAR/PLS OXIMETRY MLT: CPT

## 2023-04-03 PROCEDURE — 80048 BASIC METABOLIC PNL TOTAL CA: CPT | Performed by: NURSE PRACTITIONER

## 2023-04-03 PROCEDURE — 25000003 PHARM REV CODE 250: Performed by: FAMILY MEDICINE

## 2023-04-03 PROCEDURE — 84100 ASSAY OF PHOSPHORUS: CPT | Performed by: NURSE PRACTITIONER

## 2023-04-03 PROCEDURE — 99307 SBSQ NF CARE SF MDM 10: CPT | Mod: ,,, | Performed by: NURSE PRACTITIONER

## 2023-04-03 PROCEDURE — 99307 PR NURSING FAC CARE, SUBSEQ, IMPROVING: ICD-10-PCS | Mod: ,,, | Performed by: NURSE PRACTITIONER

## 2023-04-03 PROCEDURE — 85025 COMPLETE CBC W/AUTO DIFF WBC: CPT | Performed by: NURSE PRACTITIONER

## 2023-04-03 PROCEDURE — 99305 PR NURSING FACILITY CARE, INIT, MOD SEVERITY: ICD-10-PCS | Mod: AI,,, | Performed by: FAMILY MEDICINE

## 2023-04-03 PROCEDURE — 25000003 PHARM REV CODE 250: Performed by: NURSE PRACTITIONER

## 2023-04-03 PROCEDURE — 99305 1ST NF CARE MODERATE MDM 35: CPT | Mod: AI,,, | Performed by: FAMILY MEDICINE

## 2023-04-03 RX ADMIN — ASPIRIN 81 MG: 81 TABLET, COATED ORAL at 08:04

## 2023-04-03 RX ADMIN — CITALOPRAM HYDROBROMIDE 40 MG: 20 TABLET ORAL at 09:04

## 2023-04-03 RX ADMIN — Medication 100 MG: at 09:04

## 2023-04-03 RX ADMIN — PANTOPRAZOLE SODIUM 40 MG: 40 TABLET, DELAYED RELEASE ORAL at 09:04

## 2023-04-03 RX ADMIN — OMEGA-3 FATTY ACIDS CAP 1000 MG 1 CAPSULE: 1000 CAP at 09:04

## 2023-04-03 RX ADMIN — ARIPIPRAZOLE 5 MG: 5 TABLET ORAL at 09:04

## 2023-04-03 RX ADMIN — FLECAINIDE ACETATE 150 MG: 50 TABLET ORAL at 08:04

## 2023-04-03 RX ADMIN — OXYCODONE HYDROCHLORIDE AND ACETAMINOPHEN 1000 MG: 500 TABLET ORAL at 08:04

## 2023-04-03 RX ADMIN — FLECAINIDE ACETATE 150 MG: 50 TABLET ORAL at 09:04

## 2023-04-03 RX ADMIN — Medication 400 UNITS: at 09:04

## 2023-04-03 RX ADMIN — CYANOCOBALAMIN TAB 500 MCG 1000 MCG: 500 TAB at 09:04

## 2023-04-03 RX ADMIN — APIXABAN 5 MG: 2.5 TABLET, FILM COATED ORAL at 08:04

## 2023-04-03 RX ADMIN — SENNOSIDES AND DOCUSATE SODIUM 1 TABLET: 50; 8.6 TABLET ORAL at 08:04

## 2023-04-03 RX ADMIN — CLONIDINE HYDROCHLORIDE 0.1 MG: 0.1 TABLET ORAL at 08:04

## 2023-04-03 RX ADMIN — MUPIROCIN: 20 OINTMENT TOPICAL at 09:04

## 2023-04-03 RX ADMIN — APIXABAN 5 MG: 2.5 TABLET, FILM COATED ORAL at 09:04

## 2023-04-03 RX ADMIN — OXYBUTYNIN CHLORIDE 15 MG: 5 TABLET, EXTENDED RELEASE ORAL at 09:04

## 2023-04-03 RX ADMIN — OXYCODONE HYDROCHLORIDE AND ACETAMINOPHEN 1000 MG: 500 TABLET ORAL at 09:04

## 2023-04-03 RX ADMIN — ZINC SULFATE 220 MG (50 MG) CAPSULE 220 MG: CAPSULE at 09:04

## 2023-04-03 RX ADMIN — LEVOTHYROXINE SODIUM 100 MCG: 100 TABLET ORAL at 06:04

## 2023-04-03 RX ADMIN — SENNOSIDES AND DOCUSATE SODIUM 1 TABLET: 50; 8.6 TABLET ORAL at 09:04

## 2023-04-03 RX ADMIN — FERROUS SULFATE TAB 325 MG (65 MG ELEMENTAL FE) 1 EACH: 325 (65 FE) TAB at 09:04

## 2023-04-03 RX ADMIN — CHOLECALCIFEROL TAB 25 MCG (1000 UNIT) 1000 UNITS: 25 TAB at 09:04

## 2023-04-03 NOTE — SUBJECTIVE & OBJECTIVE
Interval History: 04/03/23 Awake and resting in bed.No complaints. H&H 7.7/26.2. Appetite is good. Continue PT and OT for strengthening.    Review of Systems   Constitutional:  Negative for appetite change, fatigue and fever.   Respiratory:  Negative for cough and shortness of breath.    Cardiovascular:  Negative for chest pain.   Gastrointestinal:  Negative for constipation, diarrhea, nausea and vomiting.   Neurological:  Positive for weakness.   Objective:     Vital Signs (Most Recent):  Temp: 98.2 °F (36.8 °C) (04/03/23 0729)  Pulse: 65 (04/03/23 0729)  Resp: 20 (04/03/23 0729)  BP: (!) 114/57 (04/03/23 0729)  SpO2: 96 % (04/03/23 0729)   Vital Signs (24h Range):  Temp:  [98 °F (36.7 °C)-98.6 °F (37 °C)] 98.2 °F (36.8 °C)  Pulse:  [65-67] 65  Resp:  [18-20] 20  SpO2:  [95 %-97 %] 96 %  BP: (114-139)/(57-70) 114/57     Weight: 98 kg (216 lb)  Body mass index is 30.13 kg/m².    Intake/Output Summary (Last 24 hours) at 4/3/2023 0938  Last data filed at 4/2/2023 2030  Gross per 24 hour   Intake 600 ml   Output --   Net 600 ml      Physical Exam  HENT:      Head: Normocephalic.      Nose: Nose normal.      Mouth/Throat:      Mouth: Mucous membranes are moist.   Cardiovascular:      Rate and Rhythm: Normal rate and regular rhythm.      Heart sounds: Normal heart sounds.   Pulmonary:      Breath sounds: Normal breath sounds.   Abdominal:      General: Bowel sounds are normal.      Palpations: Abdomen is soft.   Skin:     General: Skin is warm and dry.   Neurological:      Mental Status: He is alert and oriented to person, place, and time.   Psychiatric:         Mood and Affect: Mood normal.       Significant Labs: All pertinent labs within the past 24 hours have been reviewed.  Recent Lab Results         04/03/23  0505        Anion Gap 11       Baso # 0.04       Basophil % 0.8       BUN 14       BUN/CREAT RATIO 13       Calcium 8.4       Chloride 107       CO2 28       Creatinine 1.11       Differential Type Auto        eGFR 66       Eos # 0.23       Eosinophil % 4.6       Glucose 97       Hematocrit 26.2       Hemoglobin 7.7       Lymph # 1.00       Lymph % 20.2       Magnesium 1.9       MCH 23.0       MCHC 29.4       MCV 78.2       Mono # 0.81       Mono % 16.3       MPV 9.2       Neutrophils, Abs 2.88       Neutrophils Relative 58.1       Phosphorus 3.8       Platelets 281       Potassium 3.8       RBC 3.35       RDW 20.2       Sodium 142       WBC 4.96               Significant Imaging: I have reviewed all pertinent imaging results/findings within the past 24 hours.

## 2023-04-03 NOTE — PT/OT/SLP PROGRESS
Physical Therapy Treatment    Patient Name:  Joey King   MRN:  [id number]    Recommendations:     Discharge Recommendations: home health PT  Discharge Equipment Recommendations: walker, rolling  Barriers to discharge: None    Assessment:     Joey King is a 82 y.o. male admitted with a medical diagnosis of Weakness.  He presents with the following impairments/functional limitations: impaired endurance .    Rehab Prognosis: Good; patient would benefit from acute skilled PT services to address these deficits and reach maximum level of function.    Recent Surgery: * No surgery found *      Plan:     During this hospitalization, patient to be seen 5 x/week to address the identified rehab impairments via gait training, therapeutic activities, therapeutic exercises, neuromuscular re-education and progress toward the following goals:    Plan of Care Expires:  04/06/23    Subjective     Chief Complaint: SOB  Patient/Family Comments/goals: agrees to PT Tx   Pain/Comfort:  Pain Rating 1: 0/10      Objective:     Communicated with patient prior to session.  Patient found ambulatory in room/parker with   upon PT entry to room.     General Precautions: Standard, fall, other (see comments) (SOB)  Orthopedic Precautions: N/A  Braces: N/A  Respiratory Status: Room air     Functional Mobility:  Transfers:     Sit to Stand:  modified independence with rolling walker  Gait: 200ft x 2 with rolling walker, SVN and VC's for posture correction, proper distance from walker, proper breathing,  feet due to shuffle steps and near scissoring       AM-PAC 6 CLICK MOBILITY  Turning over in bed (including adjusting bedclothes, sheets and blankets)?: 4  Sitting down on and standing up from a chair with arms (e.g., wheelchair, bedside commode, etc.): 4  Moving from lying on back to sitting on the side of the bed?: 4  Moving to and from a bed to a chair (including a wheelchair)?: 4  Need to walk in hospital room?:  4  Climbing 3-5 steps with a railing?: 3  Basic Mobility Total Score: 23       Treatment & Education:  Nustep x 10 minutes for BUE's and BLE's  Pt declined any other treatment after gait, bike    Patient left up in chair with call button in reach..    GOALS:   Multidisciplinary Problems       Physical Therapy Goals          Problem: Physical Therapy    Goal Priority Disciplines Outcome Goal Variances Interventions   Physical Therapy Goal     PT, PT/OT Ongoing, Progressing     Description: Short-term Goals: 0.5 week  1. Patient will perform supine to/from sit with modified independence to improve independence and safety with bed mobility.  2. Patient will perform sit to/from stand without assistive device with supervision to improve independence and safety with transfers.  3. Patient will ambulate 100 feet without assistive device with standby assist to improve independence and safety with gait.  4. Patient will tolerate 15 minutes of physical therapy intervention to improve endurance and activity tolerance.    Long-term goals: 1 week  1. Patient will perform supine to/from sit with independence to improve independence and safety with bed mobility.  2. Patient will perform sit to/from stand without assistive device with independence to improve independence and safety with transfers.  3. Patient will ambulate 100 feet without assistive device with independence to improve independence and safety with gait.  4. Patient will tolerate 30 minutes of physical therapy intervention to improve endurance and activity tolerance.                         Time Tracking:     PT Received On: 04/03/23  PT Start Time: 0940     PT Stop Time: 0955  PT Total Time (min): 15 min     Billable Minutes: Therapeutic Exercise 10, gait x 5 minutes     Treatment Type: Treatment  PT/PTA: PTA     Number of PTA visits since last PT visit: 2 04/03/2023

## 2023-04-03 NOTE — HOSPITAL COURSE
04/03/23 Awake and resting in bed.No complaints. H&H 7.7/26.2. Appetite is good. Continue PT and OT for strengthening.    04/05/23 No complaints. Reports sleeping well. Doing well with therapy. Probable dc tomorrow.    04/06 Has done very well with therapy and is ready for discharge home. He has ambulated 250 feet x 2. Discharge meds reviewed with pt. He had most of meds already at home with exception on eliquis and ferrous sulfate. Eliquis and ferrous sulfate prescription  was sent to [facility] per his request. 30 day supply sent. Discussed that he will need to follow up with PCP for further prescriptions. Will make a follow up appt with his PCP Prashanth Ryan.

## 2023-04-03 NOTE — PROGRESS NOTES
"[facility] - Medical Surgical Unit  Discharge Assessment    Spoke with Mr. King on today.  Mr. King states, "At discharge I'm going home with my daughter to [place], [place].  I'll stay with her either a week or through [facility]."  Mr. King denies the need for any equipment at this time.            "

## 2023-04-03 NOTE — ASSESSMENT & PLAN NOTE
03/28/23 PT and OT to evaluate and treat     04/03/23 has walked 200 feet with rolling walker. Continue PT and OT.

## 2023-04-03 NOTE — PROGRESS NOTES
[facility]  Hospital Medicine  Progress Note    Patient Name: Joey King  MRN: [id number]  Patient Class: IP- Swing   Admission Date: 3/29/2023  Length of Stay: 5 days  Attending Physician: Len Palacios IV, DO  Primary Care Provider: Prashanth Ryan DO        Subjective:     Principal Problem:Weakness    [facility]    H      ______________________________________________________________________        HPI:  Records from [facility] show - Mr. Joey King is a 83 yo male with PMHx of hypertension,GERD, MARCELO, anemia, afib, recent PE s/p EKOS procedure on eliquis (02/15/23). Presented to ER at [facility] on 03/23/23 with complaints of SOB that began several days prior. Found to have pneumonia. Treated with rocephin and doxycyline, duonebs prn, supplemental o2. Had blood cultures with 1/2 bottle showing growth of staph epi, suspect contaminant. Has afib that is rate controlled. Has hx of PE, is on eliquis. Has MACRELO , uses cpap at hs. He remains generally weak and is in need of further physical and occupational therapy.  He has been accepted to [facility] for swingbed today- awaiting his arrival.    Mr. King has arrived , accompanied by his daughter Ashley King. He is awake, alert, oriented x 3. Has no complaints other than generalized weakness. O2 sat on room air is 98%. Chest is clear. Heart RRR. Has some bruising to forearms- he states it is from venipunctures. Appetite good. Last BM this morning. Discussed code status and he has chosen full code.       Overview/Hospital Course:  04/03/23 Awake and resting in bed.No complaints. H&H 7.7/26.2. Appetite is good. Continue PT and OT for strengthening.      Interval History: 04/03/23 Awake and resting in bed.No complaints. H&H 7.7/26.2. Appetite is good. Continue PT and OT for strengthening.    Review of Systems   Constitutional:  Negative for appetite  change, fatigue and fever.   Respiratory:  Negative for cough and shortness of breath.    Cardiovascular:  Negative for chest pain.   Gastrointestinal:  Negative for constipation, diarrhea, nausea and vomiting.   Neurological:  Positive for weakness.   Objective:     Vital Signs (Most Recent):  Temp: 98.2 °F (36.8 °C) (04/03/23 0729)  Pulse: 65 (04/03/23 0729)  Resp: 20 (04/03/23 0729)  BP: (!) 114/57 (04/03/23 0729)  SpO2: 96 % (04/03/23 0729)   Vital Signs (24h Range):  Temp:  [98 °F (36.7 °C)-98.6 °F (37 °C)] 98.2 °F (36.8 °C)  Pulse:  [65-67] 65  Resp:  [18-20] 20  SpO2:  [95 %-97 %] 96 %  BP: (114-139)/(57-70) 114/57     Weight: 98 kg (216 lb)  Body mass index is 30.13 kg/m².    Intake/Output Summary (Last 24 hours) at 4/3/2023 0938  Last data filed at 4/2/2023 2030  Gross per 24 hour   Intake 600 ml   Output --   Net 600 ml      Physical Exam  HENT:      Head: Normocephalic.      Nose: Nose normal.      Mouth/Throat:      Mouth: Mucous membranes are moist.   Cardiovascular:      Rate and Rhythm: Normal rate and regular rhythm.      Heart sounds: Normal heart sounds.   Pulmonary:      Breath sounds: Normal breath sounds.   Abdominal:      General: Bowel sounds are normal.      Palpations: Abdomen is soft.   Skin:     General: Skin is warm and dry.   Neurological:      Mental Status: He is alert and oriented to person, place, and time.   Psychiatric:         Mood and Affect: Mood normal.       Significant Labs: All pertinent labs within the past 24 hours have been reviewed.  Recent Lab Results         04/03/23  0505        Anion Gap 11       Baso # 0.04       Basophil % 0.8       BUN 14       BUN/CREAT RATIO 13       Calcium 8.4       Chloride 107       CO2 28       Creatinine 1.11       Differential Type Auto       eGFR 66       Eos # 0.23       Eosinophil % 4.6       Glucose 97       Hematocrit 26.2       Hemoglobin 7.7       Lymph # 1.00       Lymph % 20.2       Magnesium 1.9       MCH 23.0       MCHC 29.4        MCV 78.2       Mono # 0.81       Mono % 16.3       MPV 9.2       Neutrophils, Abs 2.88       Neutrophils Relative 58.1       Phosphorus 3.8       Platelets 281       Potassium 3.8       RBC 3.35       RDW 20.2       Sodium 142       WBC 4.96               Significant Imaging: I have reviewed all pertinent imaging results/findings within the past 24 hours.      Assessment/Plan:      * Weakness  03/28/23 PT and OT to evaluate and treat     04/03/23 has walked 200 feet with rolling walker. Continue PT and OT.    Paroxysmal atrial fibrillation  03/28/23 eliquis 5 mg po BID           Essential hypertension    03/28/23 continue vital every 4 hours   Clonidine 0.1mg at hs     Gastroesophageal reflux disease without esophagitis  03/28/23 protonix 40 mg po daily       Hypothyroidism    03/28/23 continue levothyroxine 100 mcg daily     Vitamin D deficiency    03/28/23 vitamin d 3 daily     MARCELO (obstructive sleep apnea)  03/28/23 CPAP at hs       Moderate major depression, single episode  03/28/23 continue celexa 40 mg po daily         Frequency of urination  03/28/23 myrbetriq and ditropan         Cobalamin deficiency  03/28/23 b 12 daily         VTE Risk Mitigation (From admission, onward)         Ordered     apixaban tablet 5 mg  2 times daily         03/29/23 1601     IP VTE LOW RISK PATIENT  Once         03/29/23 1539                Discharge Planning   DAVID: 4/6/2023     Code Status: Full Code   Is the patient medically ready for discharge?:     Reason for patient still in hospital (select all that apply): Treatment  Discharge Plan A: Home, Home with family                  JERO Centeno  Department of Hospital Medicine   [facility]

## 2023-04-03 NOTE — PLAN OF CARE
Problem: Adult Inpatient Plan of Care  Goal: Plan of Care Review  Outcome: Ongoing, Progressing  Goal: Patient-Specific Goal (Individualized)  Outcome: Ongoing, Progressing  Goal: Absence of Hospital-Acquired Illness or Injury  Outcome: Ongoing, Progressing  Goal: Optimal Comfort and Wellbeing  Outcome: Ongoing, Progressing  Goal: Readiness for Transition of Care  Outcome: Ongoing, Progressing  Intervention: Mutually Develop Transition Plan  Flowsheets (Taken 4/3/2023 1314)  Transportation Anticipated: family or friend will provide     Problem: Fall Injury Risk  Goal: Absence of Fall and Fall-Related Injury  Outcome: Ongoing, Progressing  Intervention: Identify and Manage Contributors  Flowsheets (Taken 4/3/2023 1314)  Medication Review/Management: medications reviewed

## 2023-04-04 PROCEDURE — 11000004 HC SNF PRIVATE

## 2023-04-04 PROCEDURE — 25000003 PHARM REV CODE 250: Performed by: NURSE PRACTITIONER

## 2023-04-04 PROCEDURE — 97112 NEUROMUSCULAR REEDUCATION: CPT | Mod: CQ

## 2023-04-04 PROCEDURE — 27000944

## 2023-04-04 PROCEDURE — 97116 GAIT TRAINING THERAPY: CPT | Mod: CQ

## 2023-04-04 PROCEDURE — 99900035 HC TECH TIME PER 15 MIN (STAT)

## 2023-04-04 PROCEDURE — 94761 N-INVAS EAR/PLS OXIMETRY MLT: CPT

## 2023-04-04 PROCEDURE — 25000003 PHARM REV CODE 250: Performed by: FAMILY MEDICINE

## 2023-04-04 RX ADMIN — PANTOPRAZOLE SODIUM 40 MG: 40 TABLET, DELAYED RELEASE ORAL at 08:04

## 2023-04-04 RX ADMIN — CHOLECALCIFEROL TAB 25 MCG (1000 UNIT) 1000 UNITS: 25 TAB at 08:04

## 2023-04-04 RX ADMIN — FERROUS SULFATE TAB 325 MG (65 MG ELEMENTAL FE) 1 EACH: 325 (65 FE) TAB at 08:04

## 2023-04-04 RX ADMIN — OXYCODONE HYDROCHLORIDE AND ACETAMINOPHEN 1000 MG: 500 TABLET ORAL at 09:04

## 2023-04-04 RX ADMIN — ASPIRIN 81 MG: 81 TABLET, COATED ORAL at 09:04

## 2023-04-04 RX ADMIN — OXYCODONE HYDROCHLORIDE AND ACETAMINOPHEN 1000 MG: 500 TABLET ORAL at 08:04

## 2023-04-04 RX ADMIN — ARIPIPRAZOLE 5 MG: 5 TABLET ORAL at 08:04

## 2023-04-04 RX ADMIN — FLECAINIDE ACETATE 150 MG: 50 TABLET ORAL at 08:04

## 2023-04-04 RX ADMIN — CLONIDINE HYDROCHLORIDE 0.1 MG: 0.1 TABLET ORAL at 09:04

## 2023-04-04 RX ADMIN — LEVOTHYROXINE SODIUM 100 MCG: 100 TABLET ORAL at 05:04

## 2023-04-04 RX ADMIN — SENNOSIDES AND DOCUSATE SODIUM 1 TABLET: 50; 8.6 TABLET ORAL at 09:04

## 2023-04-04 RX ADMIN — CYANOCOBALAMIN TAB 500 MCG 1000 MCG: 500 TAB at 08:04

## 2023-04-04 RX ADMIN — ZINC SULFATE 220 MG (50 MG) CAPSULE 220 MG: CAPSULE at 08:04

## 2023-04-04 RX ADMIN — SENNOSIDES AND DOCUSATE SODIUM 1 TABLET: 50; 8.6 TABLET ORAL at 08:04

## 2023-04-04 RX ADMIN — Medication 400 UNITS: at 08:04

## 2023-04-04 RX ADMIN — APIXABAN 5 MG: 2.5 TABLET, FILM COATED ORAL at 08:04

## 2023-04-04 RX ADMIN — OMEGA-3 FATTY ACIDS CAP 1000 MG 1 CAPSULE: 1000 CAP at 08:04

## 2023-04-04 RX ADMIN — OXYBUTYNIN CHLORIDE 15 MG: 5 TABLET, EXTENDED RELEASE ORAL at 08:04

## 2023-04-04 RX ADMIN — CITALOPRAM HYDROBROMIDE 40 MG: 20 TABLET ORAL at 08:04

## 2023-04-04 RX ADMIN — FLECAINIDE ACETATE 150 MG: 50 TABLET ORAL at 09:04

## 2023-04-04 RX ADMIN — Medication 100 MG: at 08:04

## 2023-04-04 RX ADMIN — APIXABAN 5 MG: 2.5 TABLET, FILM COATED ORAL at 09:04

## 2023-04-04 NOTE — PLAN OF CARE
Problem: Adult Inpatient Plan of Care  Goal: Plan of Care Review  Outcome: Ongoing, Progressing  Goal: Patient-Specific Goal (Individualized)  Outcome: Ongoing, Progressing  Goal: Absence of Hospital-Acquired Illness or Injury  Outcome: Ongoing, Progressing  Goal: Optimal Comfort and Wellbeing  Outcome: Ongoing, Progressing  Goal: Readiness for Transition of Care  Outcome: Ongoing, Progressing  Intervention: Mutually Develop Transition Plan  Flowsheets (Taken 4/4/2023 3588)  Equipment Currently Used at Home: none  Transportation Anticipated: family or friend will provide     Problem: Fall Injury Risk  Goal: Absence of Fall and Fall-Related Injury  Outcome: Ongoing, Progressing  Intervention: Identify and Manage Contributors  Flowsheets (Taken 4/4/2023 9954)  Medication Review/Management: medications reviewed

## 2023-04-04 NOTE — PT/OT/SLP PROGRESS
"Physical Therapy Treatment    Patient Name:  Joey King   MRN:  [id number]    Recommendations:     Discharge Recommendations: home health PT  Discharge Equipment Recommendations: walker, rolling  Barriers to discharge: None    Assessment:     Joey King is a 82 y.o. male admitted with a medical diagnosis of Weakness generalized.  He presents with the following impairments/functional limitations: impaired balance, impaired endurance .    Rehab Prognosis: Good; patient would benefit from acute skilled PT services to address these deficits and reach maximum level of function.    Recent Surgery: * No surgery found *      Plan:     During this hospitalization, patient to be seen 5 x/week to address the identified rehab impairments via gait training, therapeutic activities, therapeutic exercises, neuromuscular re-education and progress toward the following goals:    Plan of Care Expires:  04/06/23    Subjective     Chief Complaint: none  Patient/Family Comments/goals: ready to go home "Thursday"; agrees to PT tx   Pain/Comfort:  Pain Rating 1: 0/10      Objective:     Communicated with patient prior to session.  Patient found HOB elevated with   upon PT entry to room.     General Precautions: Standard, fall  Orthopedic Precautions: N/A  Braces: N/A  Respiratory Status: Room air     Functional Mobility:  Bed Mobility:     Supine to Sit: modified independence  Transfers:     Sit to Stand:  modified independence with no AD  Bed to Chair: modified independence with  no AD  using  Step Transfer  Gait: 200ft x 2 with rolling walker, vc's for posture correction, breath control and to  feet avoiding scissoring...pt with 50% correction/carry-over  Balance: improving, less impulsive        AM-PAC 6 CLICK MOBILITY  Turning over in bed (including adjusting bedclothes, sheets and blankets)?: 4  Sitting down on and standing up from a chair with arms (e.g., wheelchair, bedside commode, etc.): 4  Moving from " "lying on back to sitting on the side of the bed?: 4  Moving to and from a bed to a chair (including a wheelchair)?: 4  Need to walk in hospital room?: 4  Climbing 3-5 steps with a railing?: 3  Basic Mobility Total Score: 23       Treatment & Education:  In // bars for BUE support:   Sit to stand x 10 repetitions (without BUE support)  6" steps ups right and left lower extremity's x 10 repetitions each  Side stepping left and right, 2 laps each way  Static stance activities including MCTSIB: 30,24,15,8 (level floor EO, EC; foam EO, EC);  On foam with vertical and horizontal head nods x 10 repetitions each proving to be challenging with horizontal > vertical    Patient left up in chair with call button in reach..    GOALS:   Multidisciplinary Problems       Physical Therapy Goals          Problem: Physical Therapy    Goal Priority Disciplines Outcome Goal Variances Interventions   Physical Therapy Goal     PT, PT/OT Ongoing, Progressing     Description: Short-term Goals: 0.5 week  1. Patient will perform supine to/from sit with modified independence to improve independence and safety with bed mobility.  2. Patient will perform sit to/from stand without assistive device with supervision to improve independence and safety with transfers.  3. Patient will ambulate 100 feet without assistive device with standby assist to improve independence and safety with gait.  4. Patient will tolerate 15 minutes of physical therapy intervention to improve endurance and activity tolerance.    Long-term goals: 1 week  1. Patient will perform supine to/from sit with independence to improve independence and safety with bed mobility.  2. Patient will perform sit to/from stand without assistive device with independence to improve independence and safety with transfers.  3. Patient will ambulate 100 feet without assistive device with independence to improve independence and safety with gait.  4. Patient will tolerate 30 minutes of physical " therapy intervention to improve endurance and activity tolerance.                         Time Tracking:     PT Received On: 04/04/23  PT Start Time: 1335     PT Stop Time: 1400  PT Total Time (min): 25 min     Billable Minutes: Gait Training 10 and Neuromuscular Re-education 15    Treatment Type: Treatment  PT/PTA: PTA     Number of PTA visits since last PT visit: 3     04/04/2023

## 2023-04-05 PROCEDURE — 25000003 PHARM REV CODE 250: Performed by: FAMILY MEDICINE

## 2023-04-05 PROCEDURE — 99307 SBSQ NF CARE SF MDM 10: CPT | Mod: ,,, | Performed by: NURSE PRACTITIONER

## 2023-04-05 PROCEDURE — 94761 N-INVAS EAR/PLS OXIMETRY MLT: CPT

## 2023-04-05 PROCEDURE — 99307 PR NURSING FAC CARE, SUBSEQ, IMPROVING: ICD-10-PCS | Mod: ,,, | Performed by: NURSE PRACTITIONER

## 2023-04-05 PROCEDURE — 97116 GAIT TRAINING THERAPY: CPT | Mod: CQ

## 2023-04-05 PROCEDURE — 27000944

## 2023-04-05 PROCEDURE — 25000003 PHARM REV CODE 250: Performed by: NURSE PRACTITIONER

## 2023-04-05 PROCEDURE — 99900035 HC TECH TIME PER 15 MIN (STAT)

## 2023-04-05 PROCEDURE — 11000004 HC SNF PRIVATE

## 2023-04-05 RX ADMIN — SENNOSIDES AND DOCUSATE SODIUM 1 TABLET: 50; 8.6 TABLET ORAL at 08:04

## 2023-04-05 RX ADMIN — CHOLECALCIFEROL TAB 25 MCG (1000 UNIT) 1000 UNITS: 25 TAB at 10:04

## 2023-04-05 RX ADMIN — CLONIDINE HYDROCHLORIDE 0.1 MG: 0.1 TABLET ORAL at 08:04

## 2023-04-05 RX ADMIN — FLECAINIDE ACETATE 150 MG: 50 TABLET ORAL at 08:04

## 2023-04-05 RX ADMIN — OXYCODONE HYDROCHLORIDE AND ACETAMINOPHEN 1000 MG: 500 TABLET ORAL at 08:04

## 2023-04-05 RX ADMIN — CYANOCOBALAMIN TAB 500 MCG 1000 MCG: 500 TAB at 10:04

## 2023-04-05 RX ADMIN — OMEGA-3 FATTY ACIDS CAP 1000 MG 1 CAPSULE: 1000 CAP at 10:04

## 2023-04-05 RX ADMIN — ASPIRIN 81 MG: 81 TABLET, COATED ORAL at 08:04

## 2023-04-05 RX ADMIN — Medication 400 UNITS: at 10:04

## 2023-04-05 RX ADMIN — APIXABAN 5 MG: 2.5 TABLET, FILM COATED ORAL at 10:04

## 2023-04-05 RX ADMIN — SENNOSIDES AND DOCUSATE SODIUM 1 TABLET: 50; 8.6 TABLET ORAL at 10:04

## 2023-04-05 RX ADMIN — APIXABAN 5 MG: 2.5 TABLET, FILM COATED ORAL at 08:04

## 2023-04-05 RX ADMIN — PANTOPRAZOLE SODIUM 40 MG: 40 TABLET, DELAYED RELEASE ORAL at 10:04

## 2023-04-05 RX ADMIN — CITALOPRAM HYDROBROMIDE 40 MG: 20 TABLET ORAL at 10:04

## 2023-04-05 RX ADMIN — LEVOTHYROXINE SODIUM 100 MCG: 100 TABLET ORAL at 05:04

## 2023-04-05 RX ADMIN — Medication 100 MG: at 09:04

## 2023-04-05 RX ADMIN — ARIPIPRAZOLE 5 MG: 5 TABLET ORAL at 10:04

## 2023-04-05 RX ADMIN — FLECAINIDE ACETATE 150 MG: 50 TABLET ORAL at 10:04

## 2023-04-05 RX ADMIN — OXYBUTYNIN CHLORIDE 15 MG: 5 TABLET, EXTENDED RELEASE ORAL at 10:04

## 2023-04-05 RX ADMIN — FERROUS SULFATE TAB 325 MG (65 MG ELEMENTAL FE) 1 EACH: 325 (65 FE) TAB at 10:04

## 2023-04-05 RX ADMIN — ZINC SULFATE 220 MG (50 MG) CAPSULE 220 MG: CAPSULE at 10:04

## 2023-04-05 RX ADMIN — OXYCODONE HYDROCHLORIDE AND ACETAMINOPHEN 1000 MG: 500 TABLET ORAL at 10:04

## 2023-04-05 NOTE — CONSULTS
Swing bed screen:  EMR reviewed.  Pt admitted with PNA. For D/C tomorrow. Intake % meals.  RDN visited pt this p.m. and he denied difficulty with intake or questions re: diet.

## 2023-04-05 NOTE — PT/OT/SLP PROGRESS
Physical Therapy Treatment    Patient Name:  Joey King   MRN:  [id number]    Recommendations:     Discharge Recommendations: home health PT  Discharge Equipment Recommendations: walker, rolling  Barriers to discharge: None    Assessment:     Joey King is a 82 y.o. male admitted with a medical diagnosis of Weakness generalized.  He presents with the following impairments/functional limitations: impaired balance, impaired endurance.    Rehab Prognosis: Good; patient would benefit from acute skilled PT services to address these deficits and reach maximum level of function.    Recent Surgery: * No surgery found *      Plan:     During this hospitalization, patient to be seen 5 x/week to address the identified rehab impairments via gait training, therapeutic activities, therapeutic exercises, neuromuscular re-education and progress toward the following goals:    Plan of Care Expires:  04/06/23    Subjective     Chief Complaint: N/A  Patient/Family Comments/goals: return home   Pain/Comfort:  Pain Rating 1: 0/10  Pain Rating Post-Intervention 1: 0/10      Objective:     Communicated with PT prior to session.  Patient found up in chair with   upon PT entry to room.     General Precautions: Standard, fall  Orthopedic Precautions: N/A  Braces: N/A  Respiratory Status: Room air     Functional Mobility:  Transfers:     Sit to Stand:  independence with no AD  Gait: patient ambulated 250' x 2 with RW and supervision      AM-PAC 6 CLICK MOBILITY  Turning over in bed (including adjusting bedclothes, sheets and blankets)?: 4  Sitting down on and standing up from a chair with arms (e.g., wheelchair, bedside commode, etc.): 4  Moving from lying on back to sitting on the side of the bed?: 4  Moving to and from a bed to a chair (including a wheelchair)?: 4  Need to walk in hospital room?: 4  Climbing 3-5 steps with a railing?: 4  Basic Mobility Total Score: 24       Treatment & Education:  Ambulation - 500' total    Nu step - 6 minutes     Patient left up in chair with call button in reach..    GOALS:   Multidisciplinary Problems       Physical Therapy Goals          Problem: Physical Therapy    Goal Priority Disciplines Outcome Goal Variances Interventions   Physical Therapy Goal     PT, PT/OT Ongoing, Progressing     Description: Short-term Goals: 0.5 week  1. Patient will perform supine to/from sit with modified independence to improve independence and safety with bed mobility.  2. Patient will perform sit to/from stand without assistive device with supervision to improve independence and safety with transfers.  3. Patient will ambulate 100 feet without assistive device with standby assist to improve independence and safety with gait.  4. Patient will tolerate 15 minutes of physical therapy intervention to improve endurance and activity tolerance.    Long-term goals: 1 week  1. Patient will perform supine to/from sit with independence to improve independence and safety with bed mobility.  2. Patient will perform sit to/from stand without assistive device with independence to improve independence and safety with transfers.  3. Patient will ambulate 100 feet without assistive device with independence to improve independence and safety with gait.  4. Patient will tolerate 30 minutes of physical therapy intervention to improve endurance and activity tolerance.                         Time Tracking:     PT Received On: 04/05/23  PT Start Time: 1016     PT Stop Time: 1035  PT Total Time (min): 19 min     Billable Minutes: Gait Training 19    Treatment Type: Treatment  PT/PTA: PTA     Number of PTA visits since last PT visit: 4   JO Vann  04/05/2023

## 2023-04-05 NOTE — PROGRESS NOTES
[facility]  Hospital Medicine  Progress Note    Patient Name: Joey King  MRN: [id number]  Patient Class: IP- Swing   Admission Date: 3/29/2023  Length of Stay: 7 days  Attending Physician: Len Palacios IV, DO  Primary Care Provider: Prashanth Ryan DO        Subjective:     Principal Problem:Weakness generalized        HPI:  Records from [facility] show - Mr. Joey King is a 83 yo male with PMHx of hypertension,GERD, MARCELO, anemia, afib, recent PE s/p EKOS procedure on eliquis (02/15/23). Presented to ER at [facility] on 03/23/23 with complaints of SOB that began several days prior. Found to have pneumonia. Treated with rocephin and doxycyline, duonebs prn, supplemental o2. Had blood cultures with 1/2 bottle showing growth of staph epi, suspect contaminant. Has afib that is rate controlled. Has hx of PE, is on eliquis. Has MARCELO , uses cpap at . He remains generally weak and is in need of further physical and occupational therapy.  He has been accepted to [facility] for swingbed today- awaiting his arrival.    Mr. King has arrived , accompanied by his daughter Ashley King. He is awake, alert, oriented x 3. Has no complaints other than generalized weakness. O2 sat on room air is 98%. Chest is clear. Heart RRR. Has some bruising to forearms- he states it is from venipunctures. Appetite good. Last BM this morning. Discussed code status and he has chosen full code.       Overview/Hospital Course:  04/03/23 Awake and resting in bed.No complaints. H&H 7.7/26.2. Appetite is good. Continue PT and OT for strengthening.    04/05/23 No complaints. Reports sleeping well. Doing well with therapy. Probable dc tomorrow.      Interval History: 04/05/23 No complaints. Reports sleeping well. Doing well with therapy. Probable dc tomorrow.    Review of Systems   Respiratory:  Negative for cough and shortness of breath.    Cardiovascular:  Negative for  chest pain.   Gastrointestinal:  Negative for constipation, diarrhea, nausea and vomiting.   Genitourinary:  Negative for difficulty urinating.   Musculoskeletal:  Negative for arthralgias.   Neurological:  Positive for weakness.   Objective:     Vital Signs (Most Recent):  Temp: 98.2 °F (36.8 °C) (04/05/23 0701)  Pulse: 72 (04/05/23 0701)  Resp: 20 (04/05/23 0701)  BP: 138/62 (04/05/23 0701)  SpO2: 96 % (04/05/23 0701) Vital Signs (24h Range):  Temp:  [97 °F (36.1 °C)-98.2 °F (36.8 °C)] 98.2 °F (36.8 °C)  Pulse:  [58-73] 72  Resp:  [18-20] 20  SpO2:  [94 %-99 %] 96 %  BP: (120-145)/(52-74) 138/62     Weight: 99.8 kg (220 lb)  Body mass index is 30.68 kg/m².    Intake/Output Summary (Last 24 hours) at 4/5/2023 1000  Last data filed at 4/4/2023 1840  Gross per 24 hour   Intake 480 ml   Output --   Net 480 ml      Physical Exam  HENT:      Head: Normocephalic.   Cardiovascular:      Rate and Rhythm: Normal rate and regular rhythm.      Pulses: Normal pulses.      Heart sounds: Normal heart sounds.   Pulmonary:      Effort: Pulmonary effort is normal.      Breath sounds: Normal breath sounds.   Abdominal:      General: Bowel sounds are normal.      Palpations: Abdomen is soft.   Skin:     General: Skin is warm and dry.   Neurological:      Mental Status: He is alert and oriented to person, place, and time.   Psychiatric:         Mood and Affect: Mood normal.       Significant Labs: All pertinent labs within the past 24 hours have been reviewed.  Recent Lab Results       None            Significant Imaging: I have reviewed all pertinent imaging results/findings within the past 24 hours.      Assessment/Plan:      * Weakness generalized  03/28/23 PT and OT to evaluate and treat     04/03/23 has walked 200 feet with rolling walker. Continue PT and OT.    04/05 doing well with therapy. Probable dc home tomorrow    Paroxysmal atrial fibrillation  03/28/23 eliquis 5 mg po BID           Essential hypertension    03/28/23  continue vital every 4 hours   Clonidine 0.1mg at hs     Gastroesophageal reflux disease without esophagitis  03/28/23 protonix 40 mg po daily       Hypothyroidism    03/28/23 continue levothyroxine 100 mcg daily     Vitamin D deficiency    03/28/23 vitamin d 3 daily     MARCELO (obstructive sleep apnea)  03/28/23 CPAP at hs       Moderate major depression, single episode  03/28/23 continue celexa 40 mg po daily         Frequency of urination  03/28/23 myrbetriq and ditropan         Cobalamin deficiency  03/28/23 b 12 daily         VTE Risk Mitigation (From admission, onward)         Ordered     apixaban tablet 5 mg  2 times daily         03/29/23 1601     IP VTE LOW RISK PATIENT  Once         03/29/23 1539                Discharge Planning   DAVID: 4/6/2023     Code Status: Full Code   Is the patient medically ready for discharge?:     Reason for patient still in hospital (select all that apply): Treatment  Discharge Plan A: Home, Home with family                  JERO Centeno  Department of Hospital Medicine   [facility]

## 2023-04-05 NOTE — ASSESSMENT & PLAN NOTE
03/28/23 PT and OT to evaluate and treat     04/03/23 has walked 200 feet with rolling walker. Continue PT and OT.    04/05 doing well with therapy. Probable dc home tomorrow

## 2023-04-05 NOTE — SUBJECTIVE & OBJECTIVE
Interval History: 04/05/23 No complaints. Reports sleeping well. Doing well with therapy. Probable dc tomorrow.    Review of Systems   Respiratory:  Negative for cough and shortness of breath.    Cardiovascular:  Negative for chest pain.   Gastrointestinal:  Negative for constipation, diarrhea, nausea and vomiting.   Genitourinary:  Negative for difficulty urinating.   Musculoskeletal:  Negative for arthralgias.   Neurological:  Positive for weakness.   Objective:     Vital Signs (Most Recent):  Temp: 98.2 °F (36.8 °C) (04/05/23 0701)  Pulse: 72 (04/05/23 0701)  Resp: 20 (04/05/23 0701)  BP: 138/62 (04/05/23 0701)  SpO2: 96 % (04/05/23 0701) Vital Signs (24h Range):  Temp:  [97 °F (36.1 °C)-98.2 °F (36.8 °C)] 98.2 °F (36.8 °C)  Pulse:  [58-73] 72  Resp:  [18-20] 20  SpO2:  [94 %-99 %] 96 %  BP: (120-145)/(52-74) 138/62     Weight: 99.8 kg (220 lb)  Body mass index is 30.68 kg/m².    Intake/Output Summary (Last 24 hours) at 4/5/2023 1000  Last data filed at 4/4/2023 1840  Gross per 24 hour   Intake 480 ml   Output --   Net 480 ml      Physical Exam  HENT:      Head: Normocephalic.   Cardiovascular:      Rate and Rhythm: Normal rate and regular rhythm.      Pulses: Normal pulses.      Heart sounds: Normal heart sounds.   Pulmonary:      Effort: Pulmonary effort is normal.      Breath sounds: Normal breath sounds.   Abdominal:      General: Bowel sounds are normal.      Palpations: Abdomen is soft.   Skin:     General: Skin is warm and dry.   Neurological:      Mental Status: He is alert and oriented to person, place, and time.   Psychiatric:         Mood and Affect: Mood normal.       Significant Labs: All pertinent labs within the past 24 hours have been reviewed.  Recent Lab Results       None            Significant Imaging: I have reviewed all pertinent imaging results/findings within the past 24 hours.

## 2023-04-06 VITALS
TEMPERATURE: 98 F | WEIGHT: 220 LBS | HEIGHT: 71 IN | SYSTOLIC BLOOD PRESSURE: 122 MMHG | DIASTOLIC BLOOD PRESSURE: 71 MMHG | OXYGEN SATURATION: 95 % | HEART RATE: 63 BPM | RESPIRATION RATE: 20 BRPM | BODY MASS INDEX: 30.8 KG/M2

## 2023-04-06 LAB
ANION GAP SERPL CALCULATED.3IONS-SCNC: 11 MMOL/L (ref 7–16)
BASOPHILS # BLD AUTO: 0.05 K/UL (ref 0–0.2)
BASOPHILS NFR BLD AUTO: 1 % (ref 0–1)
BUN SERPL-MCNC: 13 MG/DL (ref 7–18)
BUN/CREAT SERPL: 12 (ref 6–20)
CALCIUM SERPL-MCNC: 8.3 MG/DL (ref 8.5–10.1)
CHLORIDE SERPL-SCNC: 106 MMOL/L (ref 98–107)
CO2 SERPL-SCNC: 27 MMOL/L (ref 21–32)
CREAT SERPL-MCNC: 1.1 MG/DL (ref 0.7–1.3)
DIFFERENTIAL METHOD BLD: ABNORMAL
EGFR (NO RACE VARIABLE) (RUSH/TITUS): 67 ML/MIN/1.73M²
EOSINOPHIL # BLD AUTO: 0.17 K/UL (ref 0–0.5)
EOSINOPHIL NFR BLD AUTO: 3.2 % (ref 1–4)
ERYTHROCYTE [DISTWIDTH] IN BLOOD BY AUTOMATED COUNT: 21.5 % (ref 11.5–14.5)
GLUCOSE SERPL-MCNC: 90 MG/DL (ref 74–106)
HCT VFR BLD AUTO: 27.5 % (ref 40–54)
HGB BLD-MCNC: 8.3 G/DL (ref 13.5–18)
LYMPHOCYTES # BLD AUTO: 1.02 K/UL (ref 1–4.8)
LYMPHOCYTES NFR BLD AUTO: 19.5 % (ref 27–41)
MAGNESIUM SERPL-MCNC: 1.9 MG/DL (ref 1.7–2.3)
MCH RBC QN AUTO: 23.4 PG (ref 27–31)
MCHC RBC AUTO-ENTMCNC: 30.2 G/DL (ref 32–36)
MCV RBC AUTO: 77.7 FL (ref 80–96)
MONOCYTES # BLD AUTO: 0.9 K/UL (ref 0–0.8)
MONOCYTES NFR BLD AUTO: 17.2 % (ref 2–6)
MPC BLD CALC-MCNC: 9.2 FL (ref 9.4–12.4)
NEUTROPHILS # BLD AUTO: 3.1 K/UL (ref 1.8–7.7)
NEUTROPHILS NFR BLD AUTO: 59.1 % (ref 53–65)
PHOSPHATE SERPL-MCNC: 4 MG/DL (ref 2.5–4.5)
PLATELET # BLD AUTO: 272 K/UL (ref 150–400)
POTASSIUM SERPL-SCNC: 3.7 MMOL/L (ref 3.5–5.1)
RBC # BLD AUTO: 3.54 M/UL (ref 4.6–6.2)
SODIUM SERPL-SCNC: 140 MMOL/L (ref 136–145)
WBC # BLD AUTO: 5.24 K/UL (ref 4.5–11)

## 2023-04-06 PROCEDURE — 27000944

## 2023-04-06 PROCEDURE — 99900035 HC TECH TIME PER 15 MIN (STAT)

## 2023-04-06 PROCEDURE — 25000003 PHARM REV CODE 250: Performed by: FAMILY MEDICINE

## 2023-04-06 PROCEDURE — 99316 NF DSCHRG MGMT 30 MIN+: CPT | Mod: ,,, | Performed by: FAMILY MEDICINE

## 2023-04-06 PROCEDURE — 80048 BASIC METABOLIC PNL TOTAL CA: CPT | Performed by: NURSE PRACTITIONER

## 2023-04-06 PROCEDURE — 99316 PR NURSING FAC DISCHRGE DAY,MORE 30 MIN: ICD-10-PCS | Mod: ,,, | Performed by: FAMILY MEDICINE

## 2023-04-06 PROCEDURE — 83735 ASSAY OF MAGNESIUM: CPT | Performed by: NURSE PRACTITIONER

## 2023-04-06 PROCEDURE — 25000003 PHARM REV CODE 250: Performed by: NURSE PRACTITIONER

## 2023-04-06 PROCEDURE — 84100 ASSAY OF PHOSPHORUS: CPT | Performed by: NURSE PRACTITIONER

## 2023-04-06 PROCEDURE — 85025 COMPLETE CBC W/AUTO DIFF WBC: CPT | Performed by: NURSE PRACTITIONER

## 2023-04-06 PROCEDURE — 94761 N-INVAS EAR/PLS OXIMETRY MLT: CPT

## 2023-04-06 RX ORDER — FLECAINIDE ACETATE 150 MG/1
150 TABLET ORAL EVERY 12 HOURS
Qty: 60 TABLET | Refills: 0
Start: 2023-04-06 | End: 2023-05-06

## 2023-04-06 RX ORDER — FERROUS SULFATE 325(65) MG
325 TABLET, DELAYED RELEASE (ENTERIC COATED) ORAL DAILY
Qty: 30 TABLET | Refills: 0 | Status: SHIPPED | OUTPATIENT
Start: 2023-04-06 | End: 2023-05-06

## 2023-04-06 RX ADMIN — Medication 400 UNITS: at 10:04

## 2023-04-06 RX ADMIN — Medication 100 MG: at 10:04

## 2023-04-06 RX ADMIN — ZINC SULFATE 220 MG (50 MG) CAPSULE 220 MG: CAPSULE at 10:04

## 2023-04-06 RX ADMIN — CYANOCOBALAMIN TAB 500 MCG 1000 MCG: 500 TAB at 10:04

## 2023-04-06 RX ADMIN — CITALOPRAM HYDROBROMIDE 40 MG: 20 TABLET ORAL at 10:04

## 2023-04-06 RX ADMIN — FLECAINIDE ACETATE 150 MG: 50 TABLET ORAL at 10:04

## 2023-04-06 RX ADMIN — SENNOSIDES AND DOCUSATE SODIUM 1 TABLET: 50; 8.6 TABLET ORAL at 10:04

## 2023-04-06 RX ADMIN — FERROUS SULFATE TAB 325 MG (65 MG ELEMENTAL FE) 1 EACH: 325 (65 FE) TAB at 10:04

## 2023-04-06 RX ADMIN — APIXABAN 5 MG: 2.5 TABLET, FILM COATED ORAL at 10:04

## 2023-04-06 RX ADMIN — ARIPIPRAZOLE 5 MG: 5 TABLET ORAL at 10:04

## 2023-04-06 RX ADMIN — OMEGA-3 FATTY ACIDS CAP 1000 MG 1 CAPSULE: 1000 CAP at 10:04

## 2023-04-06 RX ADMIN — CHOLECALCIFEROL TAB 25 MCG (1000 UNIT) 1000 UNITS: 25 TAB at 10:04

## 2023-04-06 RX ADMIN — OXYBUTYNIN CHLORIDE 15 MG: 5 TABLET, EXTENDED RELEASE ORAL at 10:04

## 2023-04-06 RX ADMIN — PANTOPRAZOLE SODIUM 40 MG: 40 TABLET, DELAYED RELEASE ORAL at 10:04

## 2023-04-06 RX ADMIN — OXYCODONE HYDROCHLORIDE AND ACETAMINOPHEN 1000 MG: 500 TABLET ORAL at 10:04

## 2023-04-06 RX ADMIN — LEVOTHYROXINE SODIUM 100 MCG: 100 TABLET ORAL at 05:04

## 2023-04-06 NOTE — PROGRESS NOTES
"[facility]  Discharge Assessment    Primary Care Provider: Prashanth Ryan, DO     Discharge Assessment (most recent)       BRIEF DISCHARGE ASSESSMENT - 04/06/23 1002          Discharge Planning    Assessment Type Discharge Planning Brief Assessment (P)      Support Systems Family members (P)      Patient/Family Anticipates Transition to home with family (P)      Patient/Family Anticipated Services at [facility] home health care (P)      Discharge Plan A Home with family;Home Health (P)                    Spoke with Mr. King, he is unsure of how long he will stay with his daughter.  He states, "It may be a few days, may be the weekend, may be a few weeks."  Made him aware that I need to know where to schedule his home health visits.  Spoke with Ashley King, pt's daughter, and she states, "He's coming to stay with me for a few weeks until he's stronger."  Made her aware of order for home health and she states, "I'll check to see which company we have here and I will call you back."    1030 Received call from Ashley and states, "We will choose [facility] [place]    1035 Called [facility] to make them aware of referral.  Faxed number received and will send information by fax once available.      "

## 2023-04-06 NOTE — DISCHARGE SUMMARY
[facility]  Hospital Medicine  Discharge Summary      Patient Name: Joey King  MRN: [id number]  OTF: [id number]  Patient Class: IP- Swing  Admission Date: 3/29/2023  Hospital Length of Stay: 8 days  Discharge Date and Time:  04/06/2023 9:32 AM  Attending Physician: Len Palacios IV, DO   Discharging Provider: JERO Centeno  Primary Care Provider: Prashanth Ryan DO    Primary Care Team: Networked reference to record PCT     HPI:   Records from [facility] show - Mr. Joey King is a 81 yo male with PMHx of hypertension,GERD, MARCELO, anemia, afib, recent PE s/p EKOS procedure on eliquis (02/15/23). Presented to ER at [facility] on 03/23/23 with complaints of SOB that began several days prior. Found to have pneumonia. Treated with rocephin and doxycyline, duonebs prn, supplemental o2. Had blood cultures with 1/2 bottle showing growth of staph epi, suspect contaminant. Has afib that is rate controlled. Has hx of PE, is on eliquis. Has MARCELO , uses cpap at hs. He remains generally weak and is in need of further physical and occupational therapy.  He has been accepted to [facility] for swingbed today- awaiting his arrival.    Mr. King has arrived , accompanied by his daughter Ashley King. He is awake, alert, oriented x 3. Has no complaints other than generalized weakness. O2 sat on room air is 98%. Chest is clear. Heart RRR. Has some bruising to forearms- he states it is from venipunctures. Appetite good. Last BM this morning. Discussed code status and he has chosen full code.       * No surgery found *      Hospital Course:   04/03/23 Awake and resting in bed.No complaints. H&H 7.7/26.2. Appetite is good. Continue PT and OT for strengthening.    04/05/23 No complaints. Reports sleeping well. Doing well with therapy. Probable dc tomorrow.    04/06 Has done very well with therapy and is ready for discharge home. He has ambulated 250  feet x 2. Discharge meds reviewed with pt. He had most of meds already at home with exception on eliquis and ferrous sulfate. Eliquis and ferrous sulfate prescription  was sent to [facility] per his request. 30 day supply sent. Discussed that he will need to follow up with PCP for further prescriptions. Will make a follow up appt with his PCP Prashanth Ryan.       Goals of Care Treatment Preferences:  Code Status: Full Code      Consults:   Consults (From admission, onward)          Status Ordering Provider     IP consult to case management  Once        Provider:  (Not yet assigned)    Acknowledged BRANDON FERNANDEZ IV     Inpatient consult to Registered Dietitian/Nutritionist  Once        Provider:  (Not yet assigned)    Completed SUZY GIBSON            Cardiac/Vascular  Paroxysmal atrial fibrillation  03/28/23 eliquis 5 mg po BID       04/06/23 continue eliquis BID         Other  * Weakness generalized  03/28/23 PT and OT to evaluate and treat     04/03/23 has walked 200 feet with rolling walker. Continue PT and OT.    04/05 doing well with therapy. 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    Significant Diagnostic Studies: Labs: All labs within the past 24 hours have been reviewed    Pending Diagnostic Studies:       None           Medications:  Reconciled Home Medications:      Medication List        START taking these medications      ferrous sulfate 325 (65 FE) MG EC tablet  Take 1 tablet (325 mg total) by mouth once daily.     flecainide 150 MG Tab  Commonly known as: TAMBOCOR  Take 1 tablet (150 mg total) by mouth every 12 (twelve) hours.            CHANGE how you take these medications      ABILIFY 5 MG Tab  Generic drug: ARIPiprazole  1 tablet.  What changed: Another medication with the same name was removed. Continue taking this medication, and follow the directions you see here.     apixaban 5 mg Tab  Commonly known as: ELIQUIS  Take 1 tablet (5 mg total) by mouth 2 (two) times daily.  What changed:   how to take this  when to take this     pyridoxine (vitamin B6) 50 MG Tab  Commonly known as: B-6  Take 100 mg by mouth.  What changed: Another medication with the same name was removed. Continue taking this medication, and follow the directions you see here.            CONTINUE taking these medications      aspirin 81 MG EC tablet  Commonly known as: ECOTRIN  TAKE ONE TABLET BY MOUTH DAILY TO PREVENT BLOOD CLOT     citalopram 40 MG tablet  Commonly known as: CeleXA  TAKE ONE TABLET BY MOUTH DAILY FOR DEPRESSION     cloNIDine 0.1 MG tablet  Commonly known as: CATAPRES  TAKE ONE TABLET BY MOUTH DAILY FOR HIGH BLOOD PRESSURE     cyanocobalamin 100 MCG tablet  Commonly known as: VITAMIN B-12  Take 1,000 mcg by mouth.     melatonin  Commonly known as: MELATIN  Take 3 mg by mouth nightly as needed.     mirabegron 50 mg Tb24  Commonly known as: MYRBETRIQ  Take 1 tablet by mouth once daily.     MULTIVITAMIN 50 PLUS ORAL     omega-3 fatty acids-fish oil 340-1,000 mg Cap  Take 1  capsule by mouth.     omeprazole 40 MG capsule  Commonly known as: PRILOSEC  40 mg.     oxybutynin 15 MG Tr24  Commonly known as: DITROPAN XL  Take 1 tablet by mouth once daily.     polyethylene glycol 17 gram/dose powder  Commonly known as: GLYCOLAX  Take 17 g by mouth.     SYNTHROID 100 MCG tablet  Generic drug: levothyroxine  TAKE ONE TABLET BY MOUTH DAILY IN THE MORNING FOR THYROID REPLACEMENT     vitamin D 1000 units Tab  Commonly known as: VITAMIN D3  TAKE ONE TABLET BY MOUTH DAILY (CHOLECALCIFEROL SAME AS VITAMIN D)     vitamin E 400 UNIT capsule  1 capsule.     zinc 50 mg Tab  1 tablet.     zolpidem 10 mg Tab  Commonly known as: AMBIEN  Take 10 mg by mouth.            STOP taking these medications      buPROPion 150 MG TB24 tablet  Commonly known as: WELLBUTRIN XL     calcium carbonate 600 mg calcium (1,500 mg) Tab  Commonly known as: OS-NOAH     clorazepate 3.75 MG Tab  Commonly known as: TRANXENE     eszopiclone 3 mg Tab  Commonly known as: LUNESTA     HYDROcodone-acetaminophen 7.5-325 mg per tablet  Commonly known as: NORCO     lisinopriL-hydrochlorothiazide 20-12.5 mg per tablet  Commonly known as: PRINZIDE,ZESTORETIC     MAGNESIUM (OXIDE/AA CHELATE) 300 mg Cap  Generic drug: magnesium oxide-Mg AA chelate     METAMUCIL PLUS CALCIUM 1-60 gram-mg Cap  Generic drug: psyllium husk-calcium     methocarbamoL 500 MG Tab  Commonly known as: ROBAXIN     methylphenidate HCl 5 MG tablet  Commonly known as: RITALIN     mupirocin 2 % ointment  Commonly known as: BACTROBAN     pantoprazole 40 MG tablet  Commonly known as: PROTONIX     POLY HIST FORTE 10.5-10 mg Tab  Generic drug: doxylamine-phenylephrine     pramipexole 0.125 MG tablet  Commonly known as: MIRAPEX     ULTRAM 50 mg tablet  Generic drug: traMADoL     venlafaxine 75 MG tablet  Commonly known as: EFFEXOR     VITAMIN C 500 mg/15 mL Liqd  Generic drug: vit c-ascorbate Ca-ascorb sod     VITAMIN-D + OMEGA-3 ORAL     zinc sulfate 50 mg zinc (220 mg)  capsule  Commonly known as: ZINCATE     ZOLOFT 50 MG tablet  Generic drug: sertraline              Indwelling Lines/Drains at time of discharge:   Lines/Drains/Airways       None                   Time spent on the discharge of patient: 31 minutes         Len Palacios IV, DO  Department of Hospital Medicine  [facility]

## 2023-04-06 NOTE — PROGRESS NOTES
[facility]  Discharge Final Note    Primary Care Provider: Prashanth Ryan DO    Expected Discharge Date: 4/6/2023    Final Discharge Note (most recent)       Final Note - 04/06/23 1119          Final Note    Assessment Type Final Discharge Note (P)      Anticipated Discharge Disposition Home-Health Care Svc (P)      Hospital Resources/Appts/Education Provided Provided patient/caregiver with written discharge plan information;Appointments scheduled and added to AVS (P)                      Important Message from Medicare  Important Message from Medicare regarding Discharge Appeal Rights: Given to patient/caregiver, Explained to patient/caregiver, Signed/date by patient/caregiver     Date IMM was signed: 04/05/23  Time IMM was signed: 1317    Contact Info       Prashanth Ryan DO   Specialty: Family Medicine   Relationship: PCP - General    [address] [address]   Phone: [phone]       Next Steps: Follow up in 1 week(s)    Instructions: @2:30        Patient discharged home with orders for home health.  Information faxed to [facility] in [place]

## 2023-04-06 NOTE — PLAN OF CARE
[facility] - Swing Bed   Interdisciplinary Team Meeting    Patient: Joey King   Today's Date: 04/05/2023   Estimated D/C Date: 4/6/2023        Physician: Len Palacios MD Nurse Practitioner: Edwardo Adair NP   Pharmacy: Melyssa Musa, PharmD Unit Director: BRANDON Merchant   :  Cinthya Avila RN Physical/Occupational Therapy: Kelsey Schmitt, OTR/L   Speech Therapy: NA Activity Therapy: Nesha Shane LPN   Nursing: BRANDON Merchant  Respiratory: Ish Dailey, RT Dietary: Shamar Toledo RD  Other:      Nursing  New Symptoms/Problems: N/A  Last Bowel Movement: 04/06/23  Urine: continent Diarrhea: No   Constipated: No Bowel: continent Rogers: No   Isolation: No     O2 Device: Room Air O2 Flow:   SpO2: 96 %   Nutrition: Cardiac  Speech/Swallowing: No current speech or swallowing issues  Aspiration Precautions: No  Cognition: WNL    Physical Therapy  Physical Therapy/Gait: 500 feet   ELOS: Plan to DC 4/6/2023    Transfers: Independent Range of Motion/Restrictions: None     Occupational Therapy  Eating/Grooming: Modified Independent Toileting: Modified Independent   Bathing: Modified Independent Dressing (Upper Body): Modified Independent   Dressing (Lower Body): Modified Independent      Activity Therapy  Level of participation: Active participation      Tx Plan/Recommendations reviewed with family and/or patient on (date) referral to home health at time of discharge.  Additional family Conference/Training: N/A  D/C Plan/Recommendations: Home with HH and Home with family  DAVID: 4/6/2023    Pharmacy  Medication Changes (see MD orders in chart): No  MD/NP: Len Palacios MD Labs Reviewed: Yes New Lab Orders: Yes   Lab Concerns: N/A

## 2023-04-10 ENCOUNTER — PATIENT OUTREACH (OUTPATIENT)
Dept: ADMINISTRATIVE | Facility: CLINIC | Age: 83
End: 2023-04-10

## 2023-04-10 NOTE — PROGRESS NOTES
C3 nurse attempted to contact Joey King for a TCC post hospital discharge follow up call. The patient is unable to conduct the call @ this time. The patient requested a callback.    The patient has a scheduled [facility] appointment with Prashanth Ryan DO on 04/13/2023 @ 230 pm.

## 2023-04-11 ENCOUNTER — TELEPHONE (OUTPATIENT)
Dept: CASE MANAGEMENT | Facility: HOSPITAL | Age: 83
End: 2023-04-11
Payer: MEDICARE

## 2023-04-11 NOTE — TELEPHONE ENCOUNTER
Spoke with Ashley King and she stated no one from [facility] has come to see Mr. King.  I called [facility] and she stated Mr. King had services with [facility]. She made me aware that there is a [facility] in the area and he was on their services.  Spoke with Shawanda at [facility], [phone] and Shawanda will notify the correct office to resume services with Mr. King. Made Ashley aware that a nurse from [facility] will be over to see Mr. King and not from [facility].  Verbalized understanding.

## 2023-05-10 ENCOUNTER — OUTSIDE PLACE OF SERVICE (OUTPATIENT)
Dept: ADMINISTRATIVE | Facility: HOSPITAL | Age: 83
End: 2023-05-10
Payer: MEDICARE

## 2023-05-10 PROCEDURE — 99214 OFFICE O/P EST MOD 30 MIN: CPT | Mod: 57,,, | Performed by: NURSE PRACTITIONER

## 2023-05-10 PROCEDURE — 99214 PR OFFICE/OUTPT VISIT, EST, LEVL IV, 30-39 MIN: ICD-10-PCS | Mod: 57,,, | Performed by: NURSE PRACTITIONER

## 2023-05-11 ENCOUNTER — OUTSIDE PLACE OF SERVICE (OUTPATIENT)
Dept: ADMINISTRATIVE | Facility: HOSPITAL | Age: 83
End: 2023-05-11
Payer: MEDICARE

## 2023-05-11 PROCEDURE — 35141 REPAIR DEFECT OF ARTERY: CPT | Mod: RT,,, | Performed by: SURGERY

## 2023-05-11 PROCEDURE — 35141 PR REANEURYSM/GRFT INS,COMMON FEMORAL: ICD-10-PCS | Mod: RT,,, | Performed by: SURGERY

## 2023-05-18 ENCOUNTER — OFFICE VISIT (OUTPATIENT)
Dept: SURGERY | Facility: CLINIC | Age: 83
End: 2023-05-18
Attending: SURGERY
Payer: MEDICARE

## 2023-05-18 DIAGNOSIS — Z09 FOLLOW-UP EXAMINATION, FOLLOWING OTHER SURGERY: Primary | ICD-10-CM

## 2023-05-18 PROCEDURE — 99024 PR POST-OP FOLLOW-UP VISIT: ICD-10-PCS | Mod: ,,, | Performed by: SURGERY

## 2023-05-18 PROCEDURE — G0180 PR HOME HEALTH MD CERTIFICATION: ICD-10-PCS | Mod: ,,, | Performed by: NURSE PRACTITIONER

## 2023-05-18 PROCEDURE — 99024 POSTOP FOLLOW-UP VISIT: CPT | Mod: ,,, | Performed by: SURGERY

## 2023-05-18 PROCEDURE — G0180 MD CERTIFICATION HHA PATIENT: HCPCS | Mod: ,,, | Performed by: NURSE PRACTITIONER

## 2023-05-18 PROCEDURE — 99213 OFFICE O/P EST LOW 20 MIN: CPT | Mod: PBBFAC | Performed by: SURGERY

## 2023-05-18 NOTE — PROGRESS NOTES
Post-operative Note    HPI:  The patient is status post right femoral exploration for surgery there.  He would a PATEL drain placed postoperatively and has been recording how much.  Overall he has been doing pretty good no issues or complaints.  PATEL drains putting out about 60 cc per day of serosanguineous fluid    PHYSICAL EXAM:    Incision well healed clean dry intact.  PATEL drain 60 out.  Staples in place.    No results found for this or any previous visit (from the past 504 hour(s)).     ASSESSMENT:      The patient is doing well after surgery.       PLAN:      Follow up  one-week .    Will continue to monitor the drain output and maybe pull it next week same time the staples need to come out.

## 2023-05-23 ENCOUNTER — OFFICE VISIT (OUTPATIENT)
Dept: SURGERY | Facility: CLINIC | Age: 83
End: 2023-05-23
Payer: MEDICARE

## 2023-05-23 VITALS — BODY MASS INDEX: 28.59 KG/M2 | HEART RATE: 55 BPM | WEIGHT: 205 LBS | OXYGEN SATURATION: 95 %

## 2023-05-23 DIAGNOSIS — I73.9 PVD (PERIPHERAL VASCULAR DISEASE): Primary | ICD-10-CM

## 2023-05-23 PROCEDURE — 99214 OFFICE O/P EST MOD 30 MIN: CPT | Mod: PBBFAC | Performed by: NURSE PRACTITIONER

## 2023-05-23 PROCEDURE — 99214 OFFICE O/P EST MOD 30 MIN: CPT | Mod: S$PBB,,, | Performed by: NURSE PRACTITIONER

## 2023-05-23 PROCEDURE — 99214 PR OFFICE/OUTPT VISIT, EST, LEVL IV, 30-39 MIN: ICD-10-PCS | Mod: S$PBB,,, | Performed by: NURSE PRACTITIONER

## 2023-05-23 NOTE — PROGRESS NOTES
Subjective:       Patient ID: Joey King is a 82 y.o. male.    Chief Complaint: Follow-up (Patient states some sutures are red.)  05/23 Right groin pseudoaneurysm [id number] days ago,   status  post EKOS in February access for PE at [facility] per Dr. Trimble approximally 10 days ago, was evaluated last week by Dr. Ethan SWEENEY drain was kept in place due to persistent 60 cc serous , was evaluated in the ER over the weekend due to concern for possible dislodgement of PATEL drain had some drainage around drain site, drain was intact with dressing reapplied in the ER output records have been kept of 25-35 cc of PATEL output daily      family history is not on file.  Past Medical History:   Diagnosis Date    Back injury     Hypertension     Thyroid disease       Past Surgical History:   Procedure Laterality Date    SHOULDER SURGERY      THYROID SURGERY         reports that he has never smoked. He has never used smokeless tobacco. He reports that he does not drink alcohol and does not use drugs.   HPI  Review of Systems      Objective:      Pulse (!) 55   Wt 93 kg (205 lb)   SpO2 95%   BMI 28.59 kg/m²    Physical Exam      Assessment:         Status post right groin pseudoaneurysm repair post EKOS access for PE  Incision mild erythema no drainage no pus likely inflammation from skin staples no signs of cellulitis or infection  Plan:       DC staples DC PATEL drain educated own incision care  Continue Eliquis for at least 6 months  Keep scheduled appointment with cardiologist Dr. Quezada at [facility]    Follow-up with vascular surgery p.r.n. call or return p.r.n. problems

## 2023-05-31 ENCOUNTER — EXTERNAL HOME HEALTH (OUTPATIENT)
Dept: HOME HEALTH SERVICES | Facility: HOSPITAL | Age: 83
End: 2023-05-31
Payer: MEDICARE

## 2023-06-22 ENCOUNTER — EXTERNAL HOME HEALTH (OUTPATIENT)
Dept: HOME HEALTH SERVICES | Facility: HOSPITAL | Age: 83
End: 2023-06-22
Payer: MEDICARE

## 2023-07-17 PROCEDURE — G0179 PR HOME HEALTH MD RECERTIFICATION: ICD-10-PCS | Mod: ,,, | Performed by: NURSE PRACTITIONER

## 2023-07-17 PROCEDURE — G0179 MD RECERTIFICATION HHA PT: HCPCS | Mod: ,,, | Performed by: NURSE PRACTITIONER

## 2023-07-25 ENCOUNTER — EXTERNAL HOME HEALTH (OUTPATIENT)
Dept: HOME HEALTH SERVICES | Facility: HOSPITAL | Age: 83
End: 2023-07-25
Payer: MEDICARE

## 2023-10-06 ENCOUNTER — HOSPITAL ENCOUNTER (INPATIENT)
Facility: HOSPITAL | Age: 83
LOS: 5 days | Discharge: ANOTHER HOSPITAL | DRG: 556 | End: 2023-10-11
Attending: FAMILY MEDICINE | Admitting: HOSPITALIST
Payer: MEDICARE

## 2023-10-06 DIAGNOSIS — R07.9 CHEST PAIN: ICD-10-CM

## 2023-10-06 DIAGNOSIS — F41.9 ANXIETY: ICD-10-CM

## 2023-10-06 DIAGNOSIS — K21.9 GASTROESOPHAGEAL REFLUX DISEASE WITHOUT ESOPHAGITIS: ICD-10-CM

## 2023-10-06 DIAGNOSIS — I48.0 PAROXYSMAL ATRIAL FIBRILLATION: Primary | ICD-10-CM

## 2023-10-06 DIAGNOSIS — I10 ESSENTIAL HYPERTENSION: ICD-10-CM

## 2023-10-06 DIAGNOSIS — F32.A DEPRESSION, UNSPECIFIED DEPRESSION TYPE: ICD-10-CM

## 2023-10-06 DIAGNOSIS — I50.32 CHRONIC DIASTOLIC CHF (CONGESTIVE HEART FAILURE): ICD-10-CM

## 2023-10-06 DIAGNOSIS — M62.81 MUSCLE WEAKNESS (GENERALIZED): ICD-10-CM

## 2023-10-06 DIAGNOSIS — E03.9 HYPOTHYROIDISM, UNSPECIFIED TYPE: ICD-10-CM

## 2023-10-06 LAB
ANION GAP SERPL CALCULATED.3IONS-SCNC: 10 MMOL/L (ref 7–16)
BASOPHILS # BLD AUTO: 0.05 K/UL (ref 0–0.2)
BASOPHILS NFR BLD AUTO: 0.8 % (ref 0–1)
BILIRUB UR QL STRIP: NEGATIVE
BUN SERPL-MCNC: 28 MG/DL (ref 7–18)
BUN/CREAT SERPL: 26 (ref 6–20)
BURR CELLS BLD QL SMEAR: ABNORMAL
CALCIUM SERPL-MCNC: 8.4 MG/DL (ref 8.5–10.1)
CHLORIDE SERPL-SCNC: 96 MMOL/L (ref 98–107)
CLARITY UR: ABNORMAL
CO2 SERPL-SCNC: 27 MMOL/L (ref 21–32)
COLOR UR: YELLOW
CREAT SERPL-MCNC: 1.08 MG/DL (ref 0.7–1.3)
DIFFERENTIAL METHOD BLD: ABNORMAL
EGFR (NO RACE VARIABLE) (RUSH/TITUS): 68 ML/MIN/1.73M2
EOSINOPHIL # BLD AUTO: 0.09 K/UL (ref 0–0.5)
EOSINOPHIL NFR BLD AUTO: 1.4 % (ref 1–4)
EOSINOPHIL NFR BLD MANUAL: 2 % (ref 1–4)
ERYTHROCYTE [DISTWIDTH] IN BLOOD BY AUTOMATED COUNT: 14.2 % (ref 11.5–14.5)
GLUCOSE SERPL-MCNC: 92 MG/DL (ref 74–106)
GLUCOSE UR STRIP-MCNC: NEGATIVE MG/DL
HCT VFR BLD AUTO: 40.3 % (ref 40–54)
HGB BLD-MCNC: 13.6 G/DL (ref 13.5–18)
KETONES UR STRIP-SCNC: NEGATIVE MG/DL
LEUKOCYTE ESTERASE UR QL STRIP: NEGATIVE
LYMPHOCYTES # BLD AUTO: 1.1 K/UL (ref 1–4.8)
LYMPHOCYTES NFR BLD AUTO: 16.7 % (ref 27–41)
LYMPHOCYTES NFR BLD MANUAL: 20 % (ref 27–41)
MAGNESIUM SERPL-MCNC: 2 MG/DL (ref 1.7–2.3)
MCH RBC QN AUTO: 31.1 PG (ref 27–31)
MCHC RBC AUTO-ENTMCNC: 33.7 G/DL (ref 32–36)
MCV RBC AUTO: 92.2 FL (ref 80–96)
MONOCYTES # BLD AUTO: 1.19 K/UL (ref 0–0.8)
MONOCYTES NFR BLD AUTO: 18.1 % (ref 2–6)
MONOCYTES NFR BLD MANUAL: 14 % (ref 2–6)
MPC BLD CALC-MCNC: 9 FL (ref 9.4–12.4)
NEUTROPHILS # BLD AUTO: 4.15 K/UL (ref 1.8–7.7)
NEUTROPHILS NFR BLD AUTO: 63 % (ref 53–65)
NEUTS BAND NFR BLD MANUAL: 3 % (ref 1–5)
NEUTS SEG NFR BLD MANUAL: 61 % (ref 50–62)
NITRITE UR QL STRIP: NEGATIVE
NRBC BLD MANUAL-RTO: ABNORMAL %
OVALOCYTES BLD QL SMEAR: ABNORMAL
PH UR STRIP: 6.5 PH UNITS
PLATELET # BLD AUTO: 354 K/UL (ref 150–400)
PLATELET MORPHOLOGY: NORMAL
POTASSIUM SERPL-SCNC: 5.1 MMOL/L (ref 3.5–5.1)
PROT UR QL STRIP: ABNORMAL
RBC # BLD AUTO: 4.37 M/UL (ref 4.6–6.2)
RBC # UR STRIP: NEGATIVE /UL
SODIUM SERPL-SCNC: 128 MMOL/L (ref 136–145)
SP GR UR STRIP: 1.01
UROBILINOGEN UR STRIP-ACNC: 1 MG/DL
WBC # BLD AUTO: 6.58 K/UL (ref 4.5–11)

## 2023-10-06 PROCEDURE — 80048 BASIC METABOLIC PNL TOTAL CA: CPT | Performed by: NURSE PRACTITIONER

## 2023-10-06 PROCEDURE — 94761 N-INVAS EAR/PLS OXIMETRY MLT: CPT

## 2023-10-06 PROCEDURE — 11000004 HC SNF PRIVATE

## 2023-10-06 PROCEDURE — 85025 COMPLETE CBC W/AUTO DIFF WBC: CPT | Performed by: NURSE PRACTITIONER

## 2023-10-06 PROCEDURE — 83735 ASSAY OF MAGNESIUM: CPT | Performed by: NURSE PRACTITIONER

## 2023-10-06 PROCEDURE — 25000003 PHARM REV CODE 250: Performed by: NURSE PRACTITIONER

## 2023-10-06 PROCEDURE — 81003 URINALYSIS AUTO W/O SCOPE: CPT | Performed by: NURSE PRACTITIONER

## 2023-10-06 RX ORDER — VITAMIN E MIXED 400 UNIT
400 CAPSULE ORAL DAILY
Status: DISCONTINUED | OUTPATIENT
Start: 2023-10-07 | End: 2023-10-11 | Stop reason: HOSPADM

## 2023-10-06 RX ORDER — METOPROLOL TARTRATE 25 MG/1
25 TABLET, FILM COATED ORAL 2 TIMES DAILY
Status: DISCONTINUED | OUTPATIENT
Start: 2023-10-06 | End: 2023-10-11 | Stop reason: HOSPADM

## 2023-10-06 RX ORDER — UBIDECARENONE 75 MG
1000 CAPSULE ORAL DAILY
Status: DISCONTINUED | OUTPATIENT
Start: 2023-10-07 | End: 2023-10-11 | Stop reason: HOSPADM

## 2023-10-06 RX ORDER — HYDROCODONE BITARTRATE AND ACETAMINOPHEN 7.5; 325 MG/1; MG/1
1 TABLET ORAL EVERY 4 HOURS PRN
Status: DISCONTINUED | OUTPATIENT
Start: 2023-10-06 | End: 2023-10-11 | Stop reason: HOSPADM

## 2023-10-06 RX ORDER — TRIAMCINOLONE ACETONIDE 1 MG/G
OINTMENT TOPICAL 2 TIMES DAILY
Status: DISCONTINUED | OUTPATIENT
Start: 2023-10-06 | End: 2023-10-11 | Stop reason: HOSPADM

## 2023-10-06 RX ORDER — ACETAMINOPHEN 325 MG/1
650 TABLET ORAL EVERY 6 HOURS PRN
Status: DISCONTINUED | OUTPATIENT
Start: 2023-10-06 | End: 2023-10-11 | Stop reason: HOSPADM

## 2023-10-06 RX ORDER — ASPIRIN 81 MG/1
81 TABLET ORAL DAILY
Status: DISCONTINUED | OUTPATIENT
Start: 2023-10-07 | End: 2023-10-11 | Stop reason: HOSPADM

## 2023-10-06 RX ORDER — LOSARTAN POTASSIUM 25 MG/1
25 TABLET ORAL DAILY
Status: DISCONTINUED | OUTPATIENT
Start: 2023-10-07 | End: 2023-10-11 | Stop reason: HOSPADM

## 2023-10-06 RX ORDER — GLUCOSAM/CHONDRO/HERB 149/HYAL 750-100 MG
1 TABLET ORAL DAILY
Status: DISCONTINUED | OUTPATIENT
Start: 2023-10-07 | End: 2023-10-11 | Stop reason: HOSPADM

## 2023-10-06 RX ORDER — ZOLPIDEM TARTRATE 5 MG/1
10 TABLET ORAL NIGHTLY
Status: DISCONTINUED | OUTPATIENT
Start: 2023-10-06 | End: 2023-10-06

## 2023-10-06 RX ORDER — CLONIDINE HYDROCHLORIDE 0.1 MG/1
0.1 TABLET ORAL 2 TIMES DAILY
Status: DISCONTINUED | OUTPATIENT
Start: 2023-10-06 | End: 2023-10-11 | Stop reason: HOSPADM

## 2023-10-06 RX ORDER — AMOXICILLIN 250 MG
1 CAPSULE ORAL 2 TIMES DAILY
Status: DISCONTINUED | OUTPATIENT
Start: 2023-10-06 | End: 2023-10-11 | Stop reason: HOSPADM

## 2023-10-06 RX ORDER — CITALOPRAM 20 MG/1
40 TABLET, FILM COATED ORAL DAILY
Status: DISCONTINUED | OUTPATIENT
Start: 2023-10-07 | End: 2023-10-11 | Stop reason: HOSPADM

## 2023-10-06 RX ORDER — NYSTATIN AND TRIAMCINOLONE ACETONIDE 100000; 1 [USP'U]/G; MG/G
OINTMENT TOPICAL 2 TIMES DAILY
Status: DISCONTINUED | OUTPATIENT
Start: 2023-10-06 | End: 2023-10-06

## 2023-10-06 RX ORDER — DOXYLAMINE SUCCINATE 25 MG
TABLET ORAL 2 TIMES DAILY
Status: DISCONTINUED | OUTPATIENT
Start: 2023-10-06 | End: 2023-10-11 | Stop reason: HOSPADM

## 2023-10-06 RX ORDER — LANOLIN ALCOHOL/MO/W.PET/CERES
100 CREAM (GRAM) TOPICAL DAILY
Status: DISCONTINUED | OUTPATIENT
Start: 2023-10-07 | End: 2023-10-11 | Stop reason: HOSPADM

## 2023-10-06 RX ORDER — LANOLIN ALCOHOL/MO/W.PET/CERES
1 CREAM (GRAM) TOPICAL DAILY
Status: DISCONTINUED | OUTPATIENT
Start: 2023-10-07 | End: 2023-10-11 | Stop reason: HOSPADM

## 2023-10-06 RX ORDER — LEVOTHYROXINE SODIUM 100 UG/1
100 TABLET ORAL
Status: DISCONTINUED | OUTPATIENT
Start: 2023-10-07 | End: 2023-10-11 | Stop reason: HOSPADM

## 2023-10-06 RX ORDER — PANTOPRAZOLE SODIUM 40 MG/1
40 TABLET, DELAYED RELEASE ORAL DAILY
Status: DISCONTINUED | OUTPATIENT
Start: 2023-10-07 | End: 2023-10-11 | Stop reason: HOSPADM

## 2023-10-06 RX ORDER — POTASSIUM CHLORIDE 20 MEQ/1
20 TABLET, EXTENDED RELEASE ORAL ONCE
Status: DISCONTINUED | OUTPATIENT
Start: 2023-10-06 | End: 2023-10-11 | Stop reason: HOSPADM

## 2023-10-06 RX ORDER — ARIPIPRAZOLE 5 MG/1
5 TABLET ORAL DAILY
Status: DISCONTINUED | OUTPATIENT
Start: 2023-10-07 | End: 2023-10-11 | Stop reason: HOSPADM

## 2023-10-06 RX ORDER — FLECAINIDE ACETATE 50 MG/1
100 TABLET ORAL EVERY 12 HOURS
Status: DISCONTINUED | OUTPATIENT
Start: 2023-10-06 | End: 2023-10-11 | Stop reason: HOSPADM

## 2023-10-06 RX ORDER — TALC
6 POWDER (GRAM) TOPICAL NIGHTLY PRN
Status: DISCONTINUED | OUTPATIENT
Start: 2023-10-06 | End: 2023-10-11 | Stop reason: HOSPADM

## 2023-10-06 RX ORDER — ASCORBIC ACID 500 MG
500 TABLET ORAL 2 TIMES DAILY
Status: DISCONTINUED | OUTPATIENT
Start: 2023-10-06 | End: 2023-10-11 | Stop reason: HOSPADM

## 2023-10-06 RX ORDER — OXYBUTYNIN CHLORIDE 5 MG/1
10 TABLET, EXTENDED RELEASE ORAL DAILY
Status: DISCONTINUED | OUTPATIENT
Start: 2023-10-07 | End: 2023-10-11 | Stop reason: HOSPADM

## 2023-10-06 RX ORDER — CHOLECALCIFEROL (VITAMIN D3) 25 MCG
1000 TABLET ORAL DAILY
Status: DISCONTINUED | OUTPATIENT
Start: 2023-10-07 | End: 2023-10-11 | Stop reason: HOSPADM

## 2023-10-06 RX ORDER — CALCIUM CARBONATE 200(500)MG
500 TABLET,CHEWABLE ORAL 2 TIMES DAILY PRN
Status: DISCONTINUED | OUTPATIENT
Start: 2023-10-06 | End: 2023-10-11 | Stop reason: HOSPADM

## 2023-10-06 RX ADMIN — FLECAINIDE ACETATE 100 MG: 50 TABLET ORAL at 09:10

## 2023-10-06 RX ADMIN — MICONAZOLE NITRATE: 20 CREAM TOPICAL at 09:10

## 2023-10-06 RX ADMIN — APIXABAN 5 MG: 2.5 TABLET, FILM COATED ORAL at 09:10

## 2023-10-06 RX ADMIN — TRIAMCINOLONE ACETONIDE: 1 OINTMENT TOPICAL at 09:10

## 2023-10-06 RX ADMIN — METOPROLOL TARTRATE 25 MG: 25 TABLET, FILM COATED ORAL at 09:10

## 2023-10-06 RX ADMIN — CLONIDINE HYDROCHLORIDE 0.1 MG: 0.1 TABLET ORAL at 09:10

## 2023-10-06 RX ADMIN — SENNOSIDES AND DOCUSATE SODIUM 1 TABLET: 50; 8.6 TABLET ORAL at 09:10

## 2023-10-06 RX ADMIN — OXYCODONE HYDROCHLORIDE AND ACETAMINOPHEN 500 MG: 500 TABLET ORAL at 09:10

## 2023-10-07 PROCEDURE — 27000982 HC MATTRESS, MATRIX LAL RENTAL

## 2023-10-07 PROCEDURE — 27000944

## 2023-10-07 PROCEDURE — 94761 N-INVAS EAR/PLS OXIMETRY MLT: CPT

## 2023-10-07 PROCEDURE — 25000003 PHARM REV CODE 250: Performed by: NURSE PRACTITIONER

## 2023-10-07 PROCEDURE — 11000004 HC SNF PRIVATE

## 2023-10-07 RX ORDER — MUPIROCIN 20 MG/G
OINTMENT TOPICAL 2 TIMES DAILY
Status: DISCONTINUED | OUTPATIENT
Start: 2023-10-07 | End: 2023-10-11 | Stop reason: HOSPADM

## 2023-10-07 RX ADMIN — MUPIROCIN: 20 OINTMENT TOPICAL at 09:10

## 2023-10-07 RX ADMIN — CLONIDINE HYDROCHLORIDE 0.1 MG: 0.1 TABLET ORAL at 09:10

## 2023-10-07 RX ADMIN — MICONAZOLE NITRATE: 20 CREAM TOPICAL at 09:10

## 2023-10-07 RX ADMIN — CITALOPRAM HYDROBROMIDE 40 MG: 20 TABLET ORAL at 09:10

## 2023-10-07 RX ADMIN — METOPROLOL TARTRATE 25 MG: 25 TABLET, FILM COATED ORAL at 09:10

## 2023-10-07 RX ADMIN — MELATONIN TAB 3 MG 6 MG: 3 TAB at 11:10

## 2023-10-07 RX ADMIN — Medication 400 UNITS: at 09:10

## 2023-10-07 RX ADMIN — ARIPIPRAZOLE 5 MG: 5 TABLET ORAL at 09:10

## 2023-10-07 RX ADMIN — TRIAMCINOLONE ACETONIDE: 1 OINTMENT TOPICAL at 09:10

## 2023-10-07 RX ADMIN — OMEGA-3 FATTY ACIDS CAP 1000 MG 1 CAPSULE: 1000 CAP at 09:10

## 2023-10-07 RX ADMIN — OXYCODONE HYDROCHLORIDE AND ACETAMINOPHEN 500 MG: 500 TABLET ORAL at 09:10

## 2023-10-07 RX ADMIN — Medication 100 MG: at 09:10

## 2023-10-07 RX ADMIN — ASPIRIN 81 MG: 81 TABLET, COATED ORAL at 09:10

## 2023-10-07 RX ADMIN — FERROUS SULFATE TAB 325 MG (65 MG ELEMENTAL FE) 1 EACH: 325 (65 FE) TAB at 09:10

## 2023-10-07 RX ADMIN — PANTOPRAZOLE SODIUM 40 MG: 40 TABLET, DELAYED RELEASE ORAL at 09:10

## 2023-10-07 RX ADMIN — OXYBUTYNIN CHLORIDE 10 MG: 5 TABLET, EXTENDED RELEASE ORAL at 09:10

## 2023-10-07 RX ADMIN — CYANOCOBALAMIN TAB 500 MCG 1000 MCG: 500 TAB at 09:10

## 2023-10-07 RX ADMIN — APIXABAN 5 MG: 2.5 TABLET, FILM COATED ORAL at 09:10

## 2023-10-07 RX ADMIN — LOSARTAN POTASSIUM 25 MG: 25 TABLET, FILM COATED ORAL at 09:10

## 2023-10-07 RX ADMIN — CHOLECALCIFEROL TAB 25 MCG (1000 UNIT) 1000 UNITS: 25 TAB at 09:10

## 2023-10-07 RX ADMIN — SENNOSIDES AND DOCUSATE SODIUM 1 TABLET: 50; 8.6 TABLET ORAL at 09:10

## 2023-10-07 RX ADMIN — FLECAINIDE ACETATE 100 MG: 50 TABLET ORAL at 09:10

## 2023-10-07 RX ADMIN — LEVOTHYROXINE SODIUM 100 MCG: 0.1 TABLET ORAL at 05:10

## 2023-10-07 NOTE — PROGRESS NOTES
[facility]  Hospital Medicine  Progress Note    Patient Name: Joey King  MRN: [id number]  Patient Class: IP- Swing   Admission Date: 10/6/2023  Length of Stay: 0 days  Attending Physician: Len Palacios IV, DO  Primary Care Provider: Prashanth Ryan DO    Patient case discussed with NP.  Med recon reviewed.  Agree with assessment and plan.      Subjective:     Principal Problem:Muscle weakness (generalized)    Interval History: Patient admitted to [facility] swingbed for muscle weakness following a hospital stay at [facility] where he had a cholecystectomy.     Review of Systems   Constitutional:  Positive for activity change and fatigue.   Respiratory: Negative.     Cardiovascular: Negative.    Musculoskeletal: Negative.    Neurological: Negative.    Psychiatric/Behavioral: Negative.     All other systems reviewed and are negative.    Objective:     Vital Signs (Most Recent):  Temp: 97.6 °F (36.4 °C) (10/06/23 1700)  Pulse: 80 (10/06/23 1700)  Resp: 20 (10/06/23 1740)  BP: 135/78 (10/06/23 1700)  SpO2: 98 % (10/06/23 1800) Vital Signs (24h Range):  Temp:  [97.6 °F (36.4 °C)] 97.6 °F (36.4 °C)  Pulse:  [80] 80  Resp:  [20] 20  SpO2:  [97 %-98 %] 98 %  BP: (135)/(78) 135/78     Weight: 84.4 kg (186 lb)  Body mass index is 25.94 kg/m².    Intake/Output Summary (Last 24 hours) at 10/6/2023 2002  Last data filed at 10/6/2023 1752  Gross per 24 hour   Intake 240 ml   Output --   Net 240 ml      Physical Exam  Vitals reviewed.   Cardiovascular:      Rate and Rhythm: Normal rate.      Pulses: Normal pulses.      Heart sounds: Normal heart sounds.   Pulmonary:      Effort: Pulmonary effort is normal.      Breath sounds: Normal breath sounds.   Abdominal:      General: Abdomen is flat.   Musculoskeletal:      Cervical back: Normal range of motion and neck supple.   Skin:     General: Skin is warm and dry.      Capillary Refill: Capillary refill takes less  than 2 seconds.   Neurological:      General: No focal deficit present.      Mental Status: He is alert.           Overview/Hospital Course: Patient admitted to [facility], will have PT/OT evaluate and treat patient for muscle weakness.    Significant Labs: All pertinent labs within the past 24 hours have been reviewed.    Significant Imaging: I have reviewed all pertinent imaging results/findings within the past 24 hours.    Assessment/Plan:      Active Diagnoses:    Diagnosis Date Noted POA    PRINCIPAL PROBLEM:  Muscle weakness (generalized) [M62.81] 10/06/2023 Unknown    Essential hypertension [I10] 10/23/2022 Yes    Gastroesophageal reflux disease without esophagitis [K21.9] 10/23/2022 Yes    Paroxysmal atrial fibrillation [I48.0] 10/23/2022 Yes      Problems Resolved During this Admission:     VTE Risk Mitigation (From admission, onward)           Ordered     apixaban tablet 5 mg  2 times daily         10/06/23 1740                       JERO Jacob  Department of Hospital Medicine   [facility]

## 2023-10-08 PROCEDURE — 27000982 HC MATTRESS, MATRIX LAL RENTAL

## 2023-10-08 PROCEDURE — 94761 N-INVAS EAR/PLS OXIMETRY MLT: CPT

## 2023-10-08 PROCEDURE — 27000944

## 2023-10-08 PROCEDURE — 11000004 HC SNF PRIVATE

## 2023-10-08 PROCEDURE — 25000003 PHARM REV CODE 250: Performed by: NURSE PRACTITIONER

## 2023-10-08 RX ADMIN — CITALOPRAM HYDROBROMIDE 40 MG: 20 TABLET ORAL at 09:10

## 2023-10-08 RX ADMIN — METOPROLOL TARTRATE 25 MG: 25 TABLET, FILM COATED ORAL at 08:10

## 2023-10-08 RX ADMIN — Medication 100 MG: at 09:10

## 2023-10-08 RX ADMIN — CLONIDINE HYDROCHLORIDE 0.1 MG: 0.1 TABLET ORAL at 08:10

## 2023-10-08 RX ADMIN — APIXABAN 5 MG: 2.5 TABLET, FILM COATED ORAL at 09:10

## 2023-10-08 RX ADMIN — LOSARTAN POTASSIUM 25 MG: 25 TABLET, FILM COATED ORAL at 09:10

## 2023-10-08 RX ADMIN — FLECAINIDE ACETATE 100 MG: 50 TABLET ORAL at 08:10

## 2023-10-08 RX ADMIN — MUPIROCIN: 20 OINTMENT TOPICAL at 09:10

## 2023-10-08 RX ADMIN — ARIPIPRAZOLE 5 MG: 5 TABLET ORAL at 09:10

## 2023-10-08 RX ADMIN — MUPIROCIN: 20 OINTMENT TOPICAL at 08:10

## 2023-10-08 RX ADMIN — FERROUS SULFATE TAB 325 MG (65 MG ELEMENTAL FE) 1 EACH: 325 (65 FE) TAB at 09:10

## 2023-10-08 RX ADMIN — ASPIRIN 81 MG: 81 TABLET, COATED ORAL at 09:10

## 2023-10-08 RX ADMIN — Medication 400 UNITS: at 09:10

## 2023-10-08 RX ADMIN — TRIAMCINOLONE ACETONIDE: 1 OINTMENT TOPICAL at 09:10

## 2023-10-08 RX ADMIN — SENNOSIDES AND DOCUSATE SODIUM 1 TABLET: 50; 8.6 TABLET ORAL at 08:10

## 2023-10-08 RX ADMIN — APIXABAN 5 MG: 2.5 TABLET, FILM COATED ORAL at 08:10

## 2023-10-08 RX ADMIN — CYANOCOBALAMIN TAB 500 MCG 1000 MCG: 500 TAB at 09:10

## 2023-10-08 RX ADMIN — OXYBUTYNIN CHLORIDE 10 MG: 5 TABLET, EXTENDED RELEASE ORAL at 09:10

## 2023-10-08 RX ADMIN — SENNOSIDES AND DOCUSATE SODIUM 1 TABLET: 50; 8.6 TABLET ORAL at 09:10

## 2023-10-08 RX ADMIN — OMEGA-3 FATTY ACIDS CAP 1000 MG 1 CAPSULE: 1000 CAP at 09:10

## 2023-10-08 RX ADMIN — OXYCODONE HYDROCHLORIDE AND ACETAMINOPHEN 500 MG: 500 TABLET ORAL at 08:10

## 2023-10-08 RX ADMIN — METOPROLOL TARTRATE 25 MG: 25 TABLET, FILM COATED ORAL at 09:10

## 2023-10-08 RX ADMIN — PANTOPRAZOLE SODIUM 40 MG: 40 TABLET, DELAYED RELEASE ORAL at 09:10

## 2023-10-08 RX ADMIN — CLONIDINE HYDROCHLORIDE 0.1 MG: 0.1 TABLET ORAL at 09:10

## 2023-10-08 RX ADMIN — TRIAMCINOLONE ACETONIDE: 1 OINTMENT TOPICAL at 08:10

## 2023-10-08 RX ADMIN — MICONAZOLE NITRATE: 20 CREAM TOPICAL at 08:10

## 2023-10-08 RX ADMIN — CHOLECALCIFEROL TAB 25 MCG (1000 UNIT) 1000 UNITS: 25 TAB at 09:10

## 2023-10-08 RX ADMIN — FLECAINIDE ACETATE 100 MG: 50 TABLET ORAL at 09:10

## 2023-10-08 RX ADMIN — MICONAZOLE NITRATE: 20 CREAM TOPICAL at 09:10

## 2023-10-08 RX ADMIN — LEVOTHYROXINE SODIUM 100 MCG: 0.1 TABLET ORAL at 05:10

## 2023-10-08 RX ADMIN — OXYCODONE HYDROCHLORIDE AND ACETAMINOPHEN 500 MG: 500 TABLET ORAL at 09:10

## 2023-10-08 NOTE — PLAN OF CARE
Problem: Adult Inpatient Plan of Care  Goal: Plan of Care Review  Outcome: Ongoing, Progressing  Flowsheets (Taken 10/8/2023 0507)  Plan of Care Reviewed With: patient  Goal: Patient-Specific Goal (Individualized)  Outcome: Ongoing, Progressing  Flowsheets (Taken 10/8/2023 0507)  Anxieties, Fears or Concerns: none  Individualized Care Needs: To improve strengthening to become more independent

## 2023-10-09 PROBLEM — F41.9 ANXIETY: Status: ACTIVE | Noted: 2023-10-09

## 2023-10-09 PROBLEM — F32.A DEPRESSION: Status: ACTIVE | Noted: 2022-10-23

## 2023-10-09 PROBLEM — I50.32 CHRONIC DIASTOLIC CHF (CONGESTIVE HEART FAILURE): Status: ACTIVE | Noted: 2023-10-09

## 2023-10-09 LAB
ANION GAP SERPL CALCULATED.3IONS-SCNC: 11 MMOL/L (ref 7–16)
BASOPHILS # BLD AUTO: 0.05 K/UL (ref 0–0.2)
BASOPHILS NFR BLD AUTO: 0.7 % (ref 0–1)
BUN SERPL-MCNC: 17 MG/DL (ref 7–18)
BUN/CREAT SERPL: 19 (ref 6–20)
BURR CELLS BLD QL SMEAR: ABNORMAL
CALCIUM SERPL-MCNC: 8.3 MG/DL (ref 8.5–10.1)
CHLORIDE SERPL-SCNC: 98 MMOL/L (ref 98–107)
CO2 SERPL-SCNC: 24 MMOL/L (ref 21–32)
CREAT SERPL-MCNC: 0.9 MG/DL (ref 0.7–1.3)
DIFFERENTIAL METHOD BLD: ABNORMAL
EGFR (NO RACE VARIABLE) (RUSH/TITUS): 84 ML/MIN/1.73M2
EOSINOPHIL # BLD AUTO: 0.13 K/UL (ref 0–0.5)
EOSINOPHIL NFR BLD AUTO: 1.8 % (ref 1–4)
EOSINOPHIL NFR BLD MANUAL: 3 % (ref 1–4)
ERYTHROCYTE [DISTWIDTH] IN BLOOD BY AUTOMATED COUNT: 13.7 % (ref 11.5–14.5)
GLUCOSE SERPL-MCNC: 92 MG/DL (ref 74–106)
HCT VFR BLD AUTO: 35.1 % (ref 40–54)
HGB BLD-MCNC: 12.2 G/DL (ref 13.5–18)
LYMPHOCYTES # BLD AUTO: 1.45 K/UL (ref 1–4.8)
LYMPHOCYTES NFR BLD AUTO: 20.5 % (ref 27–41)
LYMPHOCYTES NFR BLD MANUAL: 22 % (ref 27–41)
MAGNESIUM SERPL-MCNC: 1.8 MG/DL (ref 1.7–2.3)
MCH RBC QN AUTO: 31.8 PG (ref 27–31)
MCHC RBC AUTO-ENTMCNC: 34.8 G/DL (ref 32–36)
MCV RBC AUTO: 91.4 FL (ref 80–96)
MONOCYTES # BLD AUTO: 1.1 K/UL (ref 0–0.8)
MONOCYTES NFR BLD AUTO: 15.5 % (ref 2–6)
MONOCYTES NFR BLD MANUAL: 16 % (ref 2–6)
MPC BLD CALC-MCNC: 8.8 FL (ref 9.4–12.4)
NEUTROPHILS # BLD AUTO: 4.35 K/UL (ref 1.8–7.7)
NEUTROPHILS NFR BLD AUTO: 61.5 % (ref 53–65)
NEUTS SEG NFR BLD MANUAL: 59 % (ref 50–62)
NRBC BLD MANUAL-RTO: ABNORMAL %
OVALOCYTES BLD QL SMEAR: ABNORMAL
PLATELET # BLD AUTO: 300 K/UL (ref 150–400)
PLATELET MORPHOLOGY: ABNORMAL
POTASSIUM SERPL-SCNC: 4.2 MMOL/L (ref 3.5–5.1)
RBC # BLD AUTO: 3.84 M/UL (ref 4.6–6.2)
SODIUM SERPL-SCNC: 129 MMOL/L (ref 136–145)
WBC # BLD AUTO: 7.08 K/UL (ref 4.5–11)

## 2023-10-09 PROCEDURE — 80048 BASIC METABOLIC PNL TOTAL CA: CPT | Performed by: NURSE PRACTITIONER

## 2023-10-09 PROCEDURE — 27000944

## 2023-10-09 PROCEDURE — 94761 N-INVAS EAR/PLS OXIMETRY MLT: CPT

## 2023-10-09 PROCEDURE — 27000982 HC MATTRESS, MATRIX LAL RENTAL

## 2023-10-09 PROCEDURE — 99900035 HC TECH TIME PER 15 MIN (STAT)

## 2023-10-09 PROCEDURE — A6212 FOAM DRG <=16 SQ IN W/BORDER: HCPCS

## 2023-10-09 PROCEDURE — 97161 PT EVAL LOW COMPLEX 20 MIN: CPT

## 2023-10-09 PROCEDURE — 83735 ASSAY OF MAGNESIUM: CPT | Performed by: NURSE PRACTITIONER

## 2023-10-09 PROCEDURE — 25000003 PHARM REV CODE 250: Performed by: NURSE PRACTITIONER

## 2023-10-09 PROCEDURE — 11000004 HC SNF PRIVATE

## 2023-10-09 PROCEDURE — 85025 COMPLETE CBC W/AUTO DIFF WBC: CPT | Performed by: NURSE PRACTITIONER

## 2023-10-09 PROCEDURE — 99304 1ST NF CARE SF/LOW MDM 25: CPT | Mod: ,,, | Performed by: NURSE PRACTITIONER

## 2023-10-09 PROCEDURE — 99304 PR NURSING FACILITY CARE, INIT, LOW SEVERITY: ICD-10-PCS | Mod: ,,, | Performed by: NURSE PRACTITIONER

## 2023-10-09 RX ADMIN — Medication 400 UNITS: at 08:10

## 2023-10-09 RX ADMIN — MELATONIN TAB 3 MG 6 MG: 3 TAB at 08:10

## 2023-10-09 RX ADMIN — OXYBUTYNIN CHLORIDE 10 MG: 5 TABLET, EXTENDED RELEASE ORAL at 08:10

## 2023-10-09 RX ADMIN — Medication 100 MG: at 08:10

## 2023-10-09 RX ADMIN — APIXABAN 5 MG: 2.5 TABLET, FILM COATED ORAL at 08:10

## 2023-10-09 RX ADMIN — METOPROLOL TARTRATE 25 MG: 25 TABLET, FILM COATED ORAL at 08:10

## 2023-10-09 RX ADMIN — OXYCODONE HYDROCHLORIDE AND ACETAMINOPHEN 500 MG: 500 TABLET ORAL at 08:10

## 2023-10-09 RX ADMIN — FLECAINIDE ACETATE 100 MG: 50 TABLET ORAL at 08:10

## 2023-10-09 RX ADMIN — MICONAZOLE NITRATE: 20 CREAM TOPICAL at 08:10

## 2023-10-09 RX ADMIN — TRIAMCINOLONE ACETONIDE: 1 OINTMENT TOPICAL at 08:10

## 2023-10-09 RX ADMIN — PANTOPRAZOLE SODIUM 40 MG: 40 TABLET, DELAYED RELEASE ORAL at 08:10

## 2023-10-09 RX ADMIN — SENNOSIDES AND DOCUSATE SODIUM 1 TABLET: 50; 8.6 TABLET ORAL at 08:10

## 2023-10-09 RX ADMIN — CYANOCOBALAMIN TAB 500 MCG 1000 MCG: 500 TAB at 08:10

## 2023-10-09 RX ADMIN — LOSARTAN POTASSIUM 25 MG: 25 TABLET, FILM COATED ORAL at 08:10

## 2023-10-09 RX ADMIN — OMEGA-3 FATTY ACIDS CAP 1000 MG 1 CAPSULE: 1000 CAP at 08:10

## 2023-10-09 RX ADMIN — CHOLECALCIFEROL TAB 25 MCG (1000 UNIT) 1000 UNITS: 25 TAB at 08:10

## 2023-10-09 RX ADMIN — LEVOTHYROXINE SODIUM 100 MCG: 0.1 TABLET ORAL at 06:10

## 2023-10-09 RX ADMIN — CLONIDINE HYDROCHLORIDE 0.1 MG: 0.1 TABLET ORAL at 08:10

## 2023-10-09 RX ADMIN — CITALOPRAM HYDROBROMIDE 40 MG: 20 TABLET ORAL at 08:10

## 2023-10-09 RX ADMIN — MUPIROCIN: 20 OINTMENT TOPICAL at 08:10

## 2023-10-09 RX ADMIN — FERROUS SULFATE TAB 325 MG (65 MG ELEMENTAL FE) 1 EACH: 325 (65 FE) TAB at 08:10

## 2023-10-09 RX ADMIN — ASPIRIN 81 MG: 81 TABLET, COATED ORAL at 08:10

## 2023-10-09 RX ADMIN — ARIPIPRAZOLE 5 MG: 5 TABLET ORAL at 08:10

## 2023-10-09 NOTE — ASSESSMENT & PLAN NOTE
Patient with Paroxysmal (<7 days) atrial fibrillation which is controlled currently with Beta Blocker. Patient is currently in sinus rhythm.[id number] Score: 2.    10/09/23 eliquis 5 mg po BID   Continue flecainide

## 2023-10-09 NOTE — RESPIRATORY THERAPY
GOALS: Short-term Goals: 1 weeks  1. Patient will perform supine to/from sit with contact guard assist to improve independence and safety with bed mobility.  2. Patient will perform sit to/from stand with a rolling walker with contact guard assist to improve independence and safety with transfers.  3. Patient will ambulate 25-50 feet with a rolling walker with minimal assistance to improve independence and safety with gait.  4. Patient will tolerate 10 minutes of physical therapy intervention to improve endurance and activity tolerance.    Long-term goals: 3 weeks  1. Patient will perform supine to/from sit with supervision to improve independence and safety with bed mobility.  2. Patient will perform sit to/from stand with a rolling walker with contact guard assist to improve independence and safety with transfers.  3. Patient will ambulate 100 feet with a rolling walker with contact guard assist to improve independence and safety with gait.  4. Patient will tolerate 30 minutes of physical therapy intervention to improve endurance and activity tolerance.

## 2023-10-09 NOTE — PLAN OF CARE
Problem: Occupational Therapy  Goal: Occupational Therapy Goal  Description: Grooming Status:   Short Term Goal: Pt will perform grooming with SBA sitting EOB.   Long Term Goal: Pt will perform grooming/oral hygiene standing at sink with Mod I     LE dressing Status:   Short Term Goal: Pt will perform LE dressing with SBA.   Long Term Goal: Pt will perform LE dressing with Mod I.    Toileting Status:   Short Term Goal: Pt will perform toilet hygiene on BSC with Mod I.  Long Term Goal: Pt will perform toilet hygiene on toilet with no AE with Mod I.    Commode Transfer:   Short Term Goal: Pt will perform BSC t/f with Mod I.  Long Term Goal:  Pt will perform toilet t/f in bathroom with Mod I.     Bathing Status:   Long Term Goal: Pt will perform sponge bath with Mod I with no unsafe fatigue.     Strength Status: 5/5 BUEs  Long Term Goal: Pt to perform BUE strengthening with weights and/or body weight to increase ADL independence and safety    Endurance Status:   Short Term Goal:pt to perform 15 min OT treatment with 5 or greater rest breaks  Long Term Goal: pt to perform 30 min OT treat with 3 or less rest breaks     Outcome: Ongoing, Progressing

## 2023-10-09 NOTE — PLAN OF CARE
[facility]  Initial Discharge Assessment       Primary Care Provider: Prashanth Ryan DO    Admission Diagnosis: Muscle weakness (generalized) [M62.81]    Admission Date: 10/6/2023  Expected Discharge Date:     Transition of Care Barriers: (P) None    Payor: MEDICARE / Plan: MEDICARE PART A & B / Product Type: Government /     Extended Emergency Contact Information  Primary Emergency Contact: Ashley King  Mobile Phone: [phone]  Relation: Daughter  Preferred language: English   needed? No    Discharge Plan A: (P) Home, Home Health         [facility] [address]  [address] [facility] [address]  Phone: [phone] Fax: [phone]    [address]  [address] [address]  Phone: [phone] Fax: [phone]      Initial Assessment (most recent)       Adult Discharge Assessment - 10/09/23 0915          Discharge Assessment    Assessment Type Discharge Planning Assessment (P)      Confirmed/corrected address, phone number and insurance Yes (P)      Confirmed Demographics Correct on Facesheet (P)      Source of Information patient (P)      Communicated DAVID with patient/caregiver Date not available/Unable to determine (P)      Reason For Admission Weakness (P)      People in Home alone (P)      Facility Arrived From: [facility] (P)      Do you expect to return to your current living situation? Yes (P)      Do you have help at home or someone to help you manage your care at home? Yes (P)      Who are your caregiver(s) and their phone number(s)? ashley King [phone] (P)      Current cognitive status: Alert/Oriented (P)      Equipment Currently Used at Home cane, straight (P)      Patient currently being followed by outpatient case management? No (P)      Do you currently have service(s) that help you manage your care at  home? Yes (P)      Name and Contact number of agency patient has home health but unsure of agency name at this time (P)      Is the pt/caregiver preference to resume services with current agency Yes (P)      Do you take prescription medications? Yes (P)      Do you have prescription coverage? Yes (P)      Coverage Medicare A & B (P)      Is the patient taking medications as prescribed? yes (P)      Who is going to help you get home at discharge? Ashley (P)      How do you get to doctors appointments? car, drives self;family or friend will provide (P)      Are you on dialysis? No (P)      Do you take coumadin? No (P)      DME Needed Upon Discharge  walker, rolling (P)      Discharge Plan discussed with: Patient (P)      Transition of Care Barriers None (P)      Discharge Plan A Home;Home Health (P)                    Mr. King says he lives home alone and intends to return home.  His daughter sees about him and he has home health, although he's unsure of which agency at this time, he will be resuming services with them.

## 2023-10-09 NOTE — SUBJECTIVE & OBJECTIVE
Past Medical History:   Diagnosis Date    Back injury     CHF (congestive heart failure)     Hypertension     Thyroid disease        Past Surgical History:   Procedure Laterality Date    CHOLECYSTECTOMY      SHOULDER SURGERY      THYROID SURGERY         Review of patient's allergies indicates:  No Known Allergies    No current facility-administered medications on file prior to encounter.     Current Outpatient Medications on File Prior to Encounter   Medication Sig    ARIPiprazole (ABILIFY) 5 MG Tab 1 tablet.    aspirin (ECOTRIN) 81 MG EC tablet TAKE ONE TABLET BY MOUTH DAILY TO PREVENT BLOOD CLOT    citalopram (CELEXA) 40 MG tablet TAKE ONE TABLET BY MOUTH DAILY FOR DEPRESSION    cloNIDine (CATAPRES) 0.1 MG tablet TAKE ONE TABLET BY MOUTH DAILY FOR HIGH BLOOD PRESSURE    cyanocobalamin (VITAMIN B-12) 100 MCG tablet Take 1,000 mcg by mouth.    flecainide (TAMBOCOR) 150 MG Tab Take 1 tablet (150 mg total) by mouth every 12 (twelve) hours.    levothyroxine (SYNTHROID) 100 MCG tablet TAKE ONE TABLET BY MOUTH DAILY IN THE MORNING FOR THYROID REPLACEMENT    melatonin (MELATIN) Take 3 mg by mouth nightly as needed.    mirabegron (MYRBETRIQ) 50 mg Tb24 Take 1 tablet by mouth once daily.    multivit with minerals/lutein (MULTIVITAMIN 50 PLUS ORAL)     omega-3 fatty acids-fish oil 340-1,000 mg Cap Take 1 capsule by mouth.    omeprazole (PRILOSEC) 40 MG capsule 40 mg.    oxybutynin (DITROPAN XL) 15 MG TR24 Take 1 tablet by mouth once daily.    polyethylene glycol (GLYCOLAX) 17 gram/dose powder Take 17 g by mouth.    pyridoxine, vitamin B6, (B-6) 50 MG Tab Take 100 mg by mouth.    vitamin D (VITAMIN D3) 1000 units Tab TAKE ONE TABLET BY MOUTH DAILY (CHOLECALCIFEROL SAME AS VITAMIN D)    vitamin E 400 UNIT capsule 1 capsule.    zinc 50 mg Tab 1 tablet.    zolpidem (AMBIEN) 10 mg Tab Take 10 mg by mouth.     Family History    None       Tobacco Use    Smoking status: Never    Smokeless tobacco: Never   Substance and Sexual  Activity    Alcohol use: Never    Drug use: Never    Sexual activity: Not Currently     Review of Systems   Constitutional:  Positive for appetite change. Negative for fatigue and fever.        Reports poor appetite   HENT:  Negative for facial swelling, sinus pressure, sinus pain and trouble swallowing.    Eyes:  Negative for pain and discharge.   Respiratory:  Positive for cough. Negative for shortness of breath and wheezing.    Cardiovascular:  Negative for chest pain, palpitations and leg swelling.   Gastrointestinal:  Negative for abdominal distention, abdominal pain, blood in stool, constipation, diarrhea, nausea and vomiting.        Last bm 2 days ago - states he dose not fell constipated   Genitourinary:  Negative for difficulty urinating and dysuria.   Musculoskeletal:  Negative for arthralgias and back pain.   Skin:  Positive for wound.        Lap sites to abdomen- no signs of infection   Neurological:  Positive for weakness. Negative for light-headedness and headaches.     Objective:     Vital Signs (Most Recent):  Temp: 97.8 °F (36.6 °C) (10/09/23 0753)  Pulse: (!) 58 (10/09/23 0753)  Resp: 18 (10/09/23 0753)  BP: (!) 143/60 (10/09/23 0753)  SpO2: 97 % (10/09/23 0753) Vital Signs (24h Range):  Temp:  [97.7 °F (36.5 °C)-97.8 °F (36.6 °C)] 97.8 °F (36.6 °C)  Pulse:  [58-72] 58  Resp:  [18] 18  SpO2:  [97 %-99 %] 97 %  BP: (129-143)/(60-68) 143/60     Weight: 84.4 kg (186 lb)  Body mass index is 25.94 kg/m².     Physical Exam  HENT:      Head: Normocephalic.      Mouth/Throat:      Mouth: Mucous membranes are moist.   Cardiovascular:      Rate and Rhythm: Normal rate and regular rhythm.      Pulses: Normal pulses.      Heart sounds: Normal heart sounds.   Pulmonary:      Effort: Pulmonary effort is normal. No respiratory distress.      Breath sounds: Normal breath sounds. No stridor. No wheezing or rhonchi.   Abdominal:      General: Bowel sounds are normal. There is no distension.      Palpations: Abdomen  is soft. There is no mass.      Tenderness: There is no abdominal tenderness. There is no guarding or rebound.      Hernia: No hernia is present.   Musculoskeletal:         General: Normal range of motion.      Cervical back: Normal range of motion.   Skin:     General: Skin is warm and dry.      Coloration: Skin is not jaundiced or pale.      Findings: No bruising, erythema or rash.      Comments: Lap. Surgery  sites - no signs of infection   Neurological:      Mental Status: He is alert and oriented to person, place, and time.   Psychiatric:         Mood and Affect: Mood normal.                Significant Labs: All pertinent labs within the past 24 hours have been reviewed.  Recent Lab Results         10/09/23  0400        Anion Gap 11       Baso # 0.05       Basophil % 0.7       BUN 17       BUN/CREAT RATIO 19       Kenneth/Echinocytes Few       Calcium 8.3       Chloride 98       CO2 24       Creatinine 0.90       Differential Method Manual       eGFR 84       Eos # 0.13       Eosinophil % 1.8        3       Glucose 92       Hematocrit 35.1       Hemoglobin 12.2       Lymph # 1.45       Lymph % 20.5        22       Magnesium  1.8       MCH 31.8       MCHC 34.8       MCV 91.4       Mono # 1.10       Mono % 15.5        16       MPV 8.8       Neutrophils, Abs 4.35       Neutrophils Relative 61.5       nRBC       Ovalocytes Few       PLATELET MORPHOLOGY Platelet Clumping       Platelet Count 300       Potassium 4.2       RBC 3.84       RDW 13.7       Segmented Neutrophils, Man % 59       Sodium 129       WBC 7.08               Significant Imaging: I have reviewed all pertinent imaging results/findings within the past 24 hours.

## 2023-10-09 NOTE — H&P
[facility] - Medical Surgical Unit  Hospital Medicine  History & Physical    Patient Name: Joey King  MRN: [id number]  Patient Class: IP- Swing  Admission Date: 10/6/2023  Attending Physician: Jerod Urbina Jr., MD   Primary Care Provider: Prashanth Ryan DO         Patient information was obtained from patient, past medical records and ER records.     Subjective:     Principal Problem:Muscle weakness (generalized)    Chief Complaint: No chief complaint on file.       HPI: Mr. Joey King is an 83 year old male with pmh of paroxysmal afib with chronic anticoagulation with eliquis, hx of GI bleed, (09/2021) PE, DVT, htn, Chronic diastolic CHF, BPH, HLD, hypothyroidism, depression, MARCELO, GERD and anxiety. PCP is Dr. Prashanth Ryan. Mr. King was admitted to [facility] on 09/22-. He had lap. Cholecystectomy with Dr. Sree Tolbert on 09/23/23. Discharged from [facility] to swing bed here at [facility] on 10/06/23 for continued therapy. Mr. King lives alone, has home health services. States he was getting around with cane before hospitalization. Plans to return home at discharge.      Past Medical History:   Diagnosis Date    Back injury     CHF (congestive heart failure)     Hypertension     Thyroid disease        Past Surgical History:   Procedure Laterality Date    CHOLECYSTECTOMY      SHOULDER SURGERY      THYROID SURGERY         Review of patient's allergies indicates:  No Known Allergies    No current facility-administered medications on file prior to encounter.     Current Outpatient Medications on File Prior to Encounter   Medication Sig    ARIPiprazole (ABILIFY) 5 MG Tab 1 tablet.    aspirin (ECOTRIN) 81 MG EC tablet TAKE ONE TABLET BY MOUTH DAILY TO PREVENT BLOOD CLOT    citalopram (CELEXA) 40 MG tablet TAKE ONE TABLET BY MOUTH DAILY FOR DEPRESSION    cloNIDine (CATAPRES) 0.1 MG tablet TAKE ONE TABLET BY MOUTH DAILY FOR HIGH BLOOD PRESSURE     cyanocobalamin (VITAMIN B-12) 100 MCG tablet Take 1,000 mcg by mouth.    flecainide (TAMBOCOR) 150 MG Tab Take 1 tablet (150 mg total) by mouth every 12 (twelve) hours.    levothyroxine (SYNTHROID) 100 MCG tablet TAKE ONE TABLET BY MOUTH DAILY IN THE MORNING FOR THYROID REPLACEMENT    melatonin (MELATIN) Take 3 mg by mouth nightly as needed.    mirabegron (MYRBETRIQ) 50 mg Tb24 Take 1 tablet by mouth once daily.    multivit with minerals/lutein (MULTIVITAMIN 50 PLUS ORAL)     omega-3 fatty acids-fish oil 340-1,000 mg Cap Take 1 capsule by mouth.    omeprazole (PRILOSEC) 40 MG capsule 40 mg.    oxybutynin (DITROPAN XL) 15 MG TR24 Take 1 tablet by mouth once daily.    polyethylene glycol (GLYCOLAX) 17 gram/dose powder Take 17 g by mouth.    pyridoxine, vitamin B6, (B-6) 50 MG Tab Take 100 mg by mouth.    vitamin D (VITAMIN D3) 1000 units Tab TAKE ONE TABLET BY MOUTH DAILY (CHOLECALCIFEROL SAME AS VITAMIN D)    vitamin E 400 UNIT capsule 1 capsule.    zinc 50 mg Tab 1 tablet.    zolpidem (AMBIEN) 10 mg Tab Take 10 mg by mouth.     Family History    None       Tobacco Use    Smoking status: Never    Smokeless tobacco: Never   Substance and Sexual Activity    Alcohol use: Never    Drug use: Never    Sexual activity: Not Currently     Review of Systems   Constitutional:  Positive for appetite change. Negative for fatigue and fever.        Reports poor appetite   HENT:  Negative for facial swelling, sinus pressure, sinus pain and trouble swallowing.    Eyes:  Negative for pain and discharge.   Respiratory:  Positive for cough. Negative for shortness of breath and wheezing.    Cardiovascular:  Negative for chest pain, palpitations and leg swelling.   Gastrointestinal:  Negative for abdominal distention, abdominal pain, blood in stool, constipation, diarrhea, nausea and vomiting.        Last bm 2 days ago - states he dose not fell constipated   Genitourinary:  Negative for difficulty urinating and dysuria.   Musculoskeletal:   Negative for arthralgias and back pain.   Skin:  Positive for wound.        Lap sites to abdomen- no signs of infection   Neurological:  Positive for weakness. Negative for light-headedness and headaches.     Objective:     Vital Signs (Most Recent):  Temp: 97.8 °F (36.6 °C) (10/09/23 0753)  Pulse: (!) 58 (10/09/23 0753)  Resp: 18 (10/09/23 0753)  BP: (!) 143/60 (10/09/23 0753)  SpO2: 97 % (10/09/23 0753) Vital Signs (24h Range):  Temp:  [97.7 °F (36.5 °C)-97.8 °F (36.6 °C)] 97.8 °F (36.6 °C)  Pulse:  [58-72] 58  Resp:  [18] 18  SpO2:  [97 %-99 %] 97 %  BP: (129-143)/(60-68) 143/60     Weight: 84.4 kg (186 lb)  Body mass index is 25.94 kg/m².     Physical Exam  HENT:      Head: Normocephalic.      Mouth/Throat:      Mouth: Mucous membranes are moist.   Cardiovascular:      Rate and Rhythm: Normal rate and regular rhythm.      Pulses: Normal pulses.      Heart sounds: Normal heart sounds.   Pulmonary:      Effort: Pulmonary effort is normal. No respiratory distress.      Breath sounds: Normal breath sounds. No stridor. No wheezing or rhonchi.   Abdominal:      General: Bowel sounds are normal. There is no distension.      Palpations: Abdomen is soft. There is no mass.      Tenderness: There is no abdominal tenderness. There is no guarding or rebound.      Hernia: No hernia is present.   Musculoskeletal:         General: Normal range of motion.      Cervical back: Normal range of motion.   Skin:     General: Skin is warm and dry.      Coloration: Skin is not jaundiced or pale.      Findings: No bruising, erythema or rash.      Comments: Lap. Surgery  sites - no signs of infection   Neurological:      Mental Status: He is alert and oriented to person, place, and time.   Psychiatric:         Mood and Affect: Mood normal.                Significant Labs: All pertinent labs within the past 24 hours have been reviewed.  Recent Lab Results         10/09/23  0400        Anion Gap 11       Baso # 0.05       Basophil % 0.7        BUN 17       BUN/CREAT RATIO 19       Kenneth/Echinocytes Few       Calcium 8.3       Chloride 98       CO2 24       Creatinine 0.90       Differential Method Manual       eGFR 84       Eos # 0.13       Eosinophil % 1.8        3       Glucose 92       Hematocrit 35.1       Hemoglobin 12.2       Lymph # 1.45       Lymph % 20.5        22       Magnesium  1.8       MCH 31.8       MCHC 34.8       MCV 91.4       Mono # 1.10       Mono % 15.5        16       MPV 8.8       Neutrophils, Abs 4.35       Neutrophils Relative 61.5       nRBC       Ovalocytes Few       PLATELET MORPHOLOGY Platelet Clumping       Platelet Count 300       Potassium 4.2       RBC 3.84       RDW 13.7       Segmented Neutrophils, Man % 59       Sodium 129       WBC 7.08               Significant Imaging: I have reviewed all pertinent imaging results/findings within the past 24 hours.    Assessment/Plan:     * Muscle weakness (generalized)  10/09/23 PT and OT to evaluate and treat      Paroxysmal atrial fibrillation  Patient with Paroxysmal (<7 days) atrial fibrillation which is controlled currently with Beta Blocker. Patient is currently in sinus rhythm.[id number] Score: 2.    10/09/23 eliquis 5 mg po BID   Continue flecainide    Chronic diastolic CHF (congestive heart failure)  Patient is identified as having Diastolic (HFpEF) heart failure that is Chronic. CHF is currently controlled. Latest ECHO performed and demonstrates- No results found for this or any previous visit.  . Continue Beta Blocker and monitor clinical status closely. Monitor on telemetry. Patient is on CHF pathway.  Monitor strict Is&Os and daily weights. 10/09/23 stable, daily weights, intake and output    Gastroesophageal reflux disease without esophagitis  10/09/23 continue protonix      Essential hypertension  10/09/23 continue losartan, metoprolol and clonidine      Hypothyroidism  10/09/23 continue levothyroxine 100 mcg po daily      Depression  10/09/23 continue abilify and  celexa        Anxiety  10/09/23 continue celexa      Benign prostatic hyperplasia  10/09/23 continue oxybutnin       MARCELO (obstructive sleep apnea)  10/09/23 uses cpap at hs - family member to bring         VTE Risk Mitigation (From admission, onward)           Ordered     Place ULISES hose  Until discontinued         10/07/23 0658     apixaban tablet 5 mg  2 times daily         10/06/23 1740                               JERO Centeno  Department of Hospital Medicine  [facility] - Medical Surgical Unit

## 2023-10-09 NOTE — PT/OT/SLP EVAL
Physical Therapy Evaluation    Patient Name:  Joey King   MRN:  [id number]    Recommendations:     Discharge Recommendations: home health PT, outpatient PT   Discharge Equipment Recommendations: walker, rolling   Barriers to discharge: None    Assessment:     Joey King is a 83 y.o. male admitted with a medical diagnosis of Muscle weakness (generalized).  He presents with the following impairments/functional limitations: weakness, impaired endurance, impaired functional mobility, gait instability, impaired balance, decreased lower extremity function, impaired cardiopulmonary response to activity .  Patient underwent cholecystectomy at [facility] and was only able to minimally participate with PT (per dtr.)  Due to weakness and poor cardiovascular endurance.  Patient would benefit from LE strengthening ex, training in bed mobility, transfers and progression to gait using rolling walker.     Rehab Prognosis: Good; patient would benefit from acute skilled PT services to address these deficits and reach maximum level of function.    Recent Surgery: see above sx  Plan:     During this hospitalization, patient to be seen 5 x/week to address the identified rehab impairments via gait training, therapeutic activities, therapeutic exercises, neuromuscular re-education and progress toward the following goals:    Plan of Care Expires:  10/30/23    Subjective     Chief Complaint: generalized weakness  Patient/Family Comments/goals: Patient plans to return home.  Has limited CG support as his dtr is visiting from [place] and has other family who live nearby who check on him     Pain/Comfort:  Patient denies pain     Patients cultural, spiritual, Mosque conflicts given the current situation: no    Living Environment:  Patient lives alone at home.  Has steps at both front and rear doors with rail present    Prior to admission, patients level of function was ambulating using SPC due to impaired balance,  "reports he had "a few" falls.  Equipment used at home: bedside commode, cane, straight, shower chair.  At [place], patient will have assistance from family intermittently, recommend Home Health referral.    Objective:     Communicated with patient and daughter prior to session.  Patient found supine with bed alarm  upon PT entry to room.    General Precautions: Standard, fall (st)  Orthopedic Precautions:N/A   Braces: N/A  Respiratory Status: Room air    Exams:  RLE ROM: WNL  RLE Strength: 3+/5  LLE ROM: WNL  LLE Strength: 3+/5    Functional Mobility:  Bed mobility  Rolling L/ R with min assist  Scooting with min assist  Sit to and from Supine with min assist  Sit to stand from EOB with min assist       AM-PAC 6 CLICK MOBILITY  Total Score:11       Treatment & Education:  Patient performed bed mobility and transfer sit to stand with min assist.  Patient was able to stand x 1 minute for 2 attempts each with c/o dizziness, was unable to ambulate today.     Patient left with bed in chair position with call button in reach, bed alarm on, and daughter present.    GOALS: Short-term Goals: 1 weeks  1. Patient will perform supine to/from sit with contact guard assist to improve independence and safety with bed mobility.  2. Patient will perform sit to/from stand with a rolling walker with contact guard assist to improve independence and safety with transfers.  3. Patient will ambulate 25-50 feet with a rolling walker with minimal assistance to improve independence and safety with gait.  4. Patient will tolerate 10 minutes of physical therapy intervention to improve endurance and activity tolerance.    Long-term goals: 3 weeks  1. Patient will perform supine to/from sit with supervision to improve independence and safety with bed mobility.  2. Patient will perform sit to/from stand with a rolling walker with contact guard assist to improve independence and safety with transfers.  3. Patient will ambulate 100 feet with a " rolling walker with contact guard assist to improve independence and safety with gait.  4. Patient will tolerate 30 minutes of physical therapy intervention to improve endurance and activity tolerance.         History:     Past Medical History:   Diagnosis Date    Back injury     CHF (congestive heart failure)     Hypertension     Thyroid disease        Past Surgical History:   Procedure Laterality Date    CHOLECYSTECTOMY      SHOULDER SURGERY      THYROID SURGERY         Time Tracking:     PT Received On: 10/09/23  PT Start Time:    1230   PT Stop Time:  105  PT Total Time (min):   35    Billable Minutes: Evaluation 35      10/09/2023

## 2023-10-09 NOTE — HPI
Mr. Joey King is an 83 year old male with pmh of paroxysmal afib with chronic anticoagulation with eliquis, hx of GI bleed, (09/2021) PE, DVT, htn, Chronic diastolic CHF, BPH, HLD, hypothyroidism, depression, MARCELO, GERD and anxiety. PCP is Dr. Prashanth Ryan. Mr. King was admitted to [facility] on 09/22-. He had lap. Cholecystectomy with Dr. Sree Tolbert on 09/23/23. Discharged from [facility] to swing bed here at [facility] on 10/06/23 for continued therapy. Mr. King lives alone, has home health services. States he was getting around with cane before hospitalization. Plans to return home at discharge.

## 2023-10-09 NOTE — ASSESSMENT & PLAN NOTE
Patient is identified as having Diastolic (HFpEF) heart failure that is Chronic. CHF is currently controlled. Latest ECHO performed and demonstrates- No results found for this or any previous visit.  . Continue Beta Blocker and monitor clinical status closely. Monitor on telemetry. Patient is on CHF pathway.  Monitor strict Is&Os and daily weights. 10/09/23 stable, daily weights, intake and output

## 2023-10-10 ENCOUNTER — APPOINTMENT (OUTPATIENT)
Dept: RADIOLOGY | Facility: HOSPITAL | Age: 83
End: 2023-10-10
Payer: MEDICARE

## 2023-10-10 PROBLEM — E87.1 HYPONATREMIA: Status: ACTIVE | Noted: 2023-10-10

## 2023-10-10 PROBLEM — R11.0 NAUSEA: Status: ACTIVE | Noted: 2023-10-10

## 2023-10-10 LAB
ALBUMIN SERPL BCP-MCNC: 2.4 G/DL (ref 3.5–5)
ALBUMIN SERPL BCP-MCNC: 2.5 G/DL (ref 3.5–5)
ALBUMIN/GLOB SERPL: 0.5 {RATIO}
ALBUMIN/GLOB SERPL: 0.6 {RATIO}
ALP SERPL-CCNC: 137 U/L (ref 45–115)
ALP SERPL-CCNC: 139 U/L (ref 45–115)
ALT SERPL W P-5'-P-CCNC: 32 U/L (ref 16–61)
ALT SERPL W P-5'-P-CCNC: 36 U/L (ref 16–61)
ANION GAP SERPL CALCULATED.3IONS-SCNC: 13 MMOL/L (ref 7–16)
ANION GAP SERPL CALCULATED.3IONS-SCNC: 14 MMOL/L (ref 7–16)
AST SERPL W P-5'-P-CCNC: 27 U/L (ref 15–37)
AST SERPL W P-5'-P-CCNC: 37 U/L (ref 15–37)
BASOPHILS # BLD AUTO: 0.02 K/UL (ref 0–0.2)
BASOPHILS # BLD AUTO: 0.03 K/UL (ref 0–0.2)
BASOPHILS NFR BLD AUTO: 0.2 % (ref 0–1)
BASOPHILS NFR BLD AUTO: 0.4 % (ref 0–1)
BILIRUB SERPL-MCNC: 0.9 MG/DL (ref ?–1.2)
BILIRUB SERPL-MCNC: 1.1 MG/DL (ref ?–1.2)
BUN SERPL-MCNC: 14 MG/DL (ref 7–18)
BUN SERPL-MCNC: 15 MG/DL (ref 7–18)
BUN/CREAT SERPL: 13 (ref 6–20)
BUN/CREAT SERPL: 15 (ref 6–20)
CALCIUM SERPL-MCNC: 8.8 MG/DL (ref 8.5–10.1)
CALCIUM SERPL-MCNC: 8.9 MG/DL (ref 8.5–10.1)
CHLORIDE SERPL-SCNC: 94 MMOL/L (ref 98–107)
CHLORIDE SERPL-SCNC: 95 MMOL/L (ref 98–107)
CO2 SERPL-SCNC: 21 MMOL/L (ref 21–32)
CO2 SERPL-SCNC: 24 MMOL/L (ref 21–32)
CREAT SERPL-MCNC: 1.01 MG/DL (ref 0.7–1.3)
CREAT SERPL-MCNC: 1.04 MG/DL (ref 0.7–1.3)
DIFFERENTIAL METHOD BLD: ABNORMAL
DIFFERENTIAL METHOD BLD: ABNORMAL
EGFR (NO RACE VARIABLE) (RUSH/TITUS): 71 ML/MIN/1.73M2
EGFR (NO RACE VARIABLE) (RUSH/TITUS): 73 ML/MIN/1.73M2
EOSINOPHIL # BLD AUTO: 0.02 K/UL (ref 0–0.5)
EOSINOPHIL # BLD AUTO: 0.08 K/UL (ref 0–0.5)
EOSINOPHIL NFR BLD AUTO: 0.2 % (ref 1–4)
EOSINOPHIL NFR BLD AUTO: 0.9 % (ref 1–4)
ERYTHROCYTE [DISTWIDTH] IN BLOOD BY AUTOMATED COUNT: 13.6 % (ref 11.5–14.5)
ERYTHROCYTE [DISTWIDTH] IN BLOOD BY AUTOMATED COUNT: 13.7 % (ref 11.5–14.5)
GLOBULIN SER-MCNC: 4.5 G/DL (ref 2–4)
GLOBULIN SER-MCNC: 4.7 G/DL (ref 2–4)
GLUCOSE SERPL-MCNC: 118 MG/DL (ref 74–106)
GLUCOSE SERPL-MCNC: 134 MG/DL (ref 74–106)
HCT VFR BLD AUTO: 39.3 % (ref 40–54)
HCT VFR BLD AUTO: 39.5 % (ref 40–54)
HGB BLD-MCNC: 13.5 G/DL (ref 13.5–18)
HGB BLD-MCNC: 13.6 G/DL (ref 13.5–18)
LYMPHOCYTES # BLD AUTO: 0.84 K/UL (ref 1–4.8)
LYMPHOCYTES # BLD AUTO: 1.23 K/UL (ref 1–4.8)
LYMPHOCYTES NFR BLD AUTO: 14.5 % (ref 27–41)
LYMPHOCYTES NFR BLD AUTO: 9 % (ref 27–41)
MCH RBC QN AUTO: 30.9 PG (ref 27–31)
MCH RBC QN AUTO: 31.2 PG (ref 27–31)
MCHC RBC AUTO-ENTMCNC: 34.4 G/DL (ref 32–36)
MCHC RBC AUTO-ENTMCNC: 34.4 G/DL (ref 32–36)
MCV RBC AUTO: 89.8 FL (ref 80–96)
MCV RBC AUTO: 90.8 FL (ref 80–96)
MONOCYTES # BLD AUTO: 0.54 K/UL (ref 0–0.8)
MONOCYTES # BLD AUTO: 0.98 K/UL (ref 0–0.8)
MONOCYTES NFR BLD AUTO: 11.5 % (ref 2–6)
MONOCYTES NFR BLD AUTO: 5.8 % (ref 2–6)
MPC BLD CALC-MCNC: 8.7 FL (ref 9.4–12.4)
MPC BLD CALC-MCNC: 9.6 FL (ref 9.4–12.4)
NEUTROPHILS # BLD AUTO: 6.18 K/UL (ref 1.8–7.7)
NEUTROPHILS # BLD AUTO: 7.94 K/UL (ref 1.8–7.7)
NEUTROPHILS NFR BLD AUTO: 72.7 % (ref 53–65)
NEUTROPHILS NFR BLD AUTO: 84.8 % (ref 53–65)
PLATELET # BLD AUTO: 349 K/UL (ref 150–400)
PLATELET # BLD AUTO: 373 K/UL (ref 150–400)
POTASSIUM SERPL-SCNC: 3.9 MMOL/L (ref 3.5–5.1)
POTASSIUM SERPL-SCNC: 4 MMOL/L (ref 3.5–5.1)
PROT SERPL-MCNC: 7 G/DL (ref 6.4–8.2)
PROT SERPL-MCNC: 7.1 G/DL (ref 6.4–8.2)
RBC # BLD AUTO: 4.33 M/UL (ref 4.6–6.2)
RBC # BLD AUTO: 4.4 M/UL (ref 4.6–6.2)
SODIUM SERPL-SCNC: 125 MMOL/L (ref 136–145)
SODIUM SERPL-SCNC: 128 MMOL/L (ref 136–145)
TROPONIN I SERPL DL<=0.01 NG/ML-MCNC: 9.7 PG/ML
TROPONIN I SERPL DL<=0.01 NG/ML-MCNC: 9.8 PG/ML
WBC # BLD AUTO: 8.5 K/UL (ref 4.5–11)
WBC # BLD AUTO: 9.36 K/UL (ref 4.5–11)

## 2023-10-10 PROCEDURE — 93005 ELECTROCARDIOGRAM TRACING: CPT

## 2023-10-10 PROCEDURE — 25000003 PHARM REV CODE 250: Performed by: NURSE PRACTITIONER

## 2023-10-10 PROCEDURE — 99305 PR NURSING FACILITY CARE, INIT, MOD SEVERITY: ICD-10-PCS | Mod: ,,, | Performed by: FAMILY MEDICINE

## 2023-10-10 PROCEDURE — 94761 N-INVAS EAR/PLS OXIMETRY MLT: CPT

## 2023-10-10 PROCEDURE — 93010 ELECTROCARDIOGRAM REPORT: CPT | Mod: 76,,, | Performed by: HOSPITALIST

## 2023-10-10 PROCEDURE — 25000242 PHARM REV CODE 250 ALT 637 W/ HCPCS: Performed by: NURSE PRACTITIONER

## 2023-10-10 PROCEDURE — 80053 COMPREHEN METABOLIC PANEL: CPT | Performed by: NURSE PRACTITIONER

## 2023-10-10 PROCEDURE — C9113 INJ PANTOPRAZOLE SODIUM, VIA: HCPCS | Performed by: NURSE PRACTITIONER

## 2023-10-10 PROCEDURE — 71045 X-RAY EXAM CHEST 1 VIEW: CPT | Mod: TC

## 2023-10-10 PROCEDURE — 82553 CREATINE MB FRACTION: CPT | Performed by: NURSE PRACTITIONER

## 2023-10-10 PROCEDURE — 97110 THERAPEUTIC EXERCISES: CPT | Mod: CQ

## 2023-10-10 PROCEDURE — 84484 ASSAY OF TROPONIN QUANT: CPT | Performed by: NURSE PRACTITIONER

## 2023-10-10 PROCEDURE — 99305 1ST NF CARE MODERATE MDM 35: CPT | Mod: ,,, | Performed by: FAMILY MEDICINE

## 2023-10-10 PROCEDURE — 27000944

## 2023-10-10 PROCEDURE — 85025 COMPLETE CBC W/AUTO DIFF WBC: CPT | Performed by: NURSE PRACTITIONER

## 2023-10-10 PROCEDURE — 63600175 PHARM REV CODE 636 W HCPCS: Performed by: NURSE PRACTITIONER

## 2023-10-10 PROCEDURE — 11000004 HC SNF PRIVATE

## 2023-10-10 PROCEDURE — 27000982 HC MATTRESS, MATRIX LAL RENTAL

## 2023-10-10 PROCEDURE — 93010 EKG 12-LEAD: ICD-10-PCS | Mod: 76,,, | Performed by: HOSPITALIST

## 2023-10-10 PROCEDURE — 97530 THERAPEUTIC ACTIVITIES: CPT

## 2023-10-10 PROCEDURE — 25000242 PHARM REV CODE 250 ALT 637 W/ HCPCS

## 2023-10-10 PROCEDURE — 97530 THERAPEUTIC ACTIVITIES: CPT | Mod: CQ

## 2023-10-10 PROCEDURE — 63600175 PHARM REV CODE 636 W HCPCS: Performed by: FAMILY MEDICINE

## 2023-10-10 RX ORDER — NITROGLYCERIN 0.4 MG/1
0.4 TABLET SUBLINGUAL EVERY 5 MIN PRN
Status: DISCONTINUED | OUTPATIENT
Start: 2023-10-10 | End: 2023-10-11 | Stop reason: HOSPADM

## 2023-10-10 RX ORDER — SIMETHICONE 80 MG
1 TABLET,CHEWABLE ORAL EVERY 6 HOURS
Status: DISCONTINUED | OUTPATIENT
Start: 2023-10-11 | End: 2023-10-11 | Stop reason: HOSPADM

## 2023-10-10 RX ORDER — SIMETHICONE 80 MG
1 TABLET,CHEWABLE ORAL
Status: DISCONTINUED | OUTPATIENT
Start: 2023-10-10 | End: 2023-10-10

## 2023-10-10 RX ORDER — ONDANSETRON 2 MG/ML
4 INJECTION INTRAMUSCULAR; INTRAVENOUS EVERY 6 HOURS PRN
Status: DISCONTINUED | OUTPATIENT
Start: 2023-10-11 | End: 2023-10-11 | Stop reason: HOSPADM

## 2023-10-10 RX ORDER — PANTOPRAZOLE SODIUM 40 MG/10ML
80 INJECTION, POWDER, LYOPHILIZED, FOR SOLUTION INTRAVENOUS
Status: COMPLETED | OUTPATIENT
Start: 2023-10-10 | End: 2023-10-10

## 2023-10-10 RX ORDER — ONDANSETRON 4 MG/1
4 TABLET, ORALLY DISINTEGRATING ORAL ONCE
Status: COMPLETED | OUTPATIENT
Start: 2023-10-10 | End: 2023-10-10

## 2023-10-10 RX ORDER — SODIUM CHLORIDE 9 MG/ML
INJECTION, SOLUTION INTRAVENOUS CONTINUOUS
Status: DISCONTINUED | OUTPATIENT
Start: 2023-10-10 | End: 2023-10-10

## 2023-10-10 RX ORDER — SODIUM CHLORIDE 9 MG/ML
INJECTION, SOLUTION INTRAVENOUS CONTINUOUS
Status: DISCONTINUED | OUTPATIENT
Start: 2023-10-11 | End: 2023-10-11 | Stop reason: HOSPADM

## 2023-10-10 RX ORDER — MORPHINE SULFATE 4 MG/ML
2 INJECTION, SOLUTION INTRAMUSCULAR; INTRAVENOUS ONCE
Status: COMPLETED | OUTPATIENT
Start: 2023-10-10 | End: 2023-10-10

## 2023-10-10 RX ORDER — NITROGLYCERIN 0.4 MG/1
TABLET SUBLINGUAL
Status: COMPLETED
Start: 2023-10-10 | End: 2023-10-10

## 2023-10-10 RX ORDER — PROMETHAZINE HYDROCHLORIDE 25 MG/ML
25 INJECTION, SOLUTION INTRAMUSCULAR; INTRAVENOUS ONCE
Status: COMPLETED | OUTPATIENT
Start: 2023-10-10 | End: 2023-10-10

## 2023-10-10 RX ADMIN — PROMETHAZINE HYDROCHLORIDE 25 MG: 25 INJECTION INTRAMUSCULAR; INTRAVENOUS at 06:10

## 2023-10-10 RX ADMIN — METOPROLOL TARTRATE 25 MG: 25 TABLET, FILM COATED ORAL at 09:10

## 2023-10-10 RX ADMIN — FLECAINIDE ACETATE 100 MG: 50 TABLET ORAL at 10:10

## 2023-10-10 RX ADMIN — SODIUM CHLORIDE: 9 INJECTION, SOLUTION INTRAVENOUS at 11:10

## 2023-10-10 RX ADMIN — SODIUM CHLORIDE: 9 INJECTION, SOLUTION INTRAVENOUS at 05:10

## 2023-10-10 RX ADMIN — NITROGLYCERIN 0.4 MG: 0.4 TABLET SUBLINGUAL at 04:10

## 2023-10-10 RX ADMIN — Medication 100 MG: at 10:10

## 2023-10-10 RX ADMIN — TRIAMCINOLONE ACETONIDE: 1 OINTMENT TOPICAL at 09:10

## 2023-10-10 RX ADMIN — MUPIROCIN: 20 OINTMENT TOPICAL at 11:10

## 2023-10-10 RX ADMIN — FERROUS SULFATE TAB 325 MG (65 MG ELEMENTAL FE) 1 EACH: 325 (65 FE) TAB at 10:10

## 2023-10-10 RX ADMIN — MICONAZOLE NITRATE: 20 CREAM TOPICAL at 11:10

## 2023-10-10 RX ADMIN — ASPIRIN 81 MG: 81 TABLET, COATED ORAL at 10:10

## 2023-10-10 RX ADMIN — OXYCODONE HYDROCHLORIDE AND ACETAMINOPHEN 500 MG: 500 TABLET ORAL at 10:10

## 2023-10-10 RX ADMIN — MICONAZOLE NITRATE: 20 CREAM TOPICAL at 09:10

## 2023-10-10 RX ADMIN — FLECAINIDE ACETATE 100 MG: 50 TABLET ORAL at 09:10

## 2023-10-10 RX ADMIN — MORPHINE SULFATE 2 MG: 4 INJECTION, SOLUTION INTRAMUSCULAR; INTRAVENOUS at 04:10

## 2023-10-10 RX ADMIN — CITALOPRAM HYDROBROMIDE 40 MG: 20 TABLET ORAL at 10:10

## 2023-10-10 RX ADMIN — LOSARTAN POTASSIUM 25 MG: 25 TABLET, FILM COATED ORAL at 10:10

## 2023-10-10 RX ADMIN — ARIPIPRAZOLE 5 MG: 5 TABLET ORAL at 10:10

## 2023-10-10 RX ADMIN — CHOLECALCIFEROL TAB 25 MCG (1000 UNIT) 1000 UNITS: 25 TAB at 10:10

## 2023-10-10 RX ADMIN — LEVOTHYROXINE SODIUM 100 MCG: 0.1 TABLET ORAL at 06:10

## 2023-10-10 RX ADMIN — OXYBUTYNIN CHLORIDE 10 MG: 5 TABLET, EXTENDED RELEASE ORAL at 10:10

## 2023-10-10 RX ADMIN — Medication 400 UNITS: at 10:10

## 2023-10-10 RX ADMIN — ONDANSETRON 4 MG: 4 TABLET, ORALLY DISINTEGRATING ORAL at 02:10

## 2023-10-10 RX ADMIN — ONDANSETRON 4 MG: 4 TABLET, ORALLY DISINTEGRATING ORAL at 03:10

## 2023-10-10 RX ADMIN — MUPIROCIN: 20 OINTMENT TOPICAL at 09:10

## 2023-10-10 RX ADMIN — SIMETHICONE 80 MG: 80 TABLET, CHEWABLE ORAL at 05:10

## 2023-10-10 RX ADMIN — ONDANSETRON 4 MG: 2 INJECTION INTRAMUSCULAR; INTRAVENOUS at 11:10

## 2023-10-10 RX ADMIN — OMEGA-3 FATTY ACIDS CAP 1000 MG 1 CAPSULE: 1000 CAP at 10:10

## 2023-10-10 RX ADMIN — APIXABAN 5 MG: 2.5 TABLET, FILM COATED ORAL at 10:10

## 2023-10-10 RX ADMIN — CYANOCOBALAMIN TAB 500 MCG 1000 MCG: 500 TAB at 10:10

## 2023-10-10 RX ADMIN — PANTOPRAZOLE SODIUM 40 MG: 40 TABLET, DELAYED RELEASE ORAL at 10:10

## 2023-10-10 RX ADMIN — TRIAMCINOLONE ACETONIDE: 1 OINTMENT TOPICAL at 11:10

## 2023-10-10 RX ADMIN — CLONIDINE HYDROCHLORIDE 0.1 MG: 0.1 TABLET ORAL at 10:10

## 2023-10-10 RX ADMIN — CLONIDINE HYDROCHLORIDE 0.1 MG: 0.1 TABLET ORAL at 09:10

## 2023-10-10 RX ADMIN — APIXABAN 5 MG: 2.5 TABLET, FILM COATED ORAL at 09:10

## 2023-10-10 RX ADMIN — PANTOPRAZOLE SODIUM 80 MG: 40 INJECTION, POWDER, LYOPHILIZED, FOR SOLUTION INTRAVENOUS at 11:10

## 2023-10-10 RX ADMIN — SENNOSIDES AND DOCUSATE SODIUM 1 TABLET: 50; 8.6 TABLET ORAL at 10:10

## 2023-10-10 NOTE — PLAN OF CARE
Problem: Impaired Wound Healing  Goal: Optimal Wound Healing  Outcome: Ongoing, Progressing  Intervention: Promote Wound Healing  Flowsheets (Taken 10/10/2023 1244)  Pain Management Interventions:   care clustered   pillow support provided   position adjusted     Problem: Skin Injury Risk Increased  Goal: Skin Health and Integrity  Outcome: Ongoing, Progressing  Intervention: Optimize Skin Protection  Flowsheets (Taken 10/10/2023 1244)  Pressure Reduction Techniques:   frequent weight shift encouraged   weight shift assistance provided  Pressure Reduction Devices:   positioning supports utilized   pressure-redistributing mattress utilized  Skin Protection:   incontinence pads utilized   silicone foam dressing in place  Head of Bed (HOB) Positioning: HOB elevated     Problem: Fall Injury Risk  Goal: Absence of Fall and Fall-Related Injury  Outcome: Ongoing, Progressing  Intervention: Identify and Manage Contributors  Flowsheets (Taken 10/10/2023 1244)  Self-Care Promotion:   BADL personal objects within reach   BADL personal routines maintained   meal set-up provided   independence encouraged   safe use of adaptive equipment encouraged  Intervention: Promote Injury-Free Environment  Flowsheets (Taken 10/10/2023 1244)  Safety Promotion/Fall Prevention:   assistive device/personal item within reach   bed alarm set   diversional activities provided   Fall Risk reviewed with patient/family   Fall Risk signage in place   nonskid shoes/socks when out of bed   lighting adjusted   side rails raised x 3   instructed to call staff for mobility   room near unit station

## 2023-10-10 NOTE — PT/OT/SLP PROGRESS
Physical Therapy Treatment    Patient Name:  Joey King   MRN:  [id number]    Recommendations:     Discharge Recommendations: home health PT, outpatient PT  Discharge Equipment Recommendations: walker, rolling  Barriers to discharge: None    Assessment:     Joey King is a 83 y.o. male admitted with a medical diagnosis of Muscle weakness (generalized).  He presents with the following impairments/functional limitations: weakness, impaired endurance, impaired functional mobility, gait instability, impaired balance, decreased lower extremity function, impaired cardiopulmonary response to activity .    Rehab Prognosis: Good and Fair; patient would benefit from acute skilled PT services to address these deficits and reach maximum level of function.    Recent Surgery: * No surgery found *      Received Plan of Care per Flavio Aguilar PT     Plan:     During this hospitalization, patient to be seen 5 x/week to address the identified rehab impairments via gait training, therapeutic activities, therapeutic exercises, neuromuscular re-education and progress toward the following goals:    Plan of Care Expires:  10/30/23    Subjective     Chief Complaint: weakness   Patient/Family Comments/goals: agrees to exercises in bed, declined to get up today  Pain/Comfort:  Pain Rating 1: 0/10      Objective:     Communicated with patient prior to session.  Patient found HOB elevated with bed alarm upon PT entry to room.     General Precautions: Standard, fall  Orthopedic Precautions: N/A  Braces: N/A  Respiratory Status: Room air     Functional Mobility:  Bed Mobility:     Scooting up in bed: modified independence and bed in trendelenburg  Bridging: modified independence      AM-PAC 6 CLICK MOBILITY          Treatment & Education:  Bed mobility as noted   Supine bilateral lower extremity exercises x 20 repetitions each with vc's/demo required: ankle pumps, heel slides, hip abduction, straight leg raises, bridging    Pt required frequent rest breaks due to fatigue     Patient left HOB elevated with call button in reach, bed alarm on, and daughter present..    GOALS:   Multidisciplinary Problems       Physical Therapy Goals          Problem: Physical Therapy    Goal Priority Disciplines Outcome Goal Variances Interventions   Physical Therapy Goal     PT, PT/OT Ongoing, Progressing                         Time Tracking:     PT Received On: 10/10/23  PT Start Time: 1130     PT Stop Time: 1157  PT Total Time (min): 27 min     Billable Minutes: Therapeutic Activity 10 and Therapeutic Exercise 17    Treatment Type: Treatment  PT/PTA: PTA     Number of PTA visits since last PT visit: 1     10/10/2023

## 2023-10-10 NOTE — NURSING
1600- called to room per daughter stating pt has chest pain. RRT initiated. L Quezada FNP in room. Nitro given times 3 q 5 minutes starting at 1615. Chest pain rated 5/10 after 3rd dose given. Morphine given for continued chest pain. Pt c/o nausea and feeling the need to have a BM. Checked for impaction per L. Quezada FNP. None noted. Moderate dark green soft stool noted to brief. Simethicone given for excess gas. Daughter remains at bedside.

## 2023-10-10 NOTE — SUBJECTIVE & OBJECTIVE
Interval History: weakness    Review of Systems   Constitutional:  Positive for appetite change. Negative for fatigue and fever.        Reports poor appetite   HENT:  Negative for facial swelling, sinus pressure, sinus pain and trouble swallowing.    Eyes:  Negative for pain and discharge.   Respiratory:  Positive for cough. Negative for shortness of breath and wheezing.    Cardiovascular:  Negative for chest pain, palpitations and leg swelling.   Gastrointestinal:  Negative for abdominal distention, abdominal pain, blood in stool, constipation, diarrhea, nausea and vomiting.   Genitourinary:  Negative for difficulty urinating and dysuria.   Musculoskeletal:  Negative for arthralgias and back pain.   Skin:  Positive for wound.        Lap sites to abdomen- no signs of infection   Neurological:  Positive for weakness. Negative for light-headedness and headaches.     Objective:     Vital Signs (Most Recent):  Temp: 97.4 °F (36.3 °C) (10/10/23 0750)  Pulse: 62 (10/10/23 1353)  Resp: 20 (10/10/23 0750)  BP: (!) 104/56 (10/10/23 0750)  SpO2: 98 % (10/10/23 0750) Vital Signs (24h Range):  Temp:  [97.4 °F (36.3 °C)-97.8 °F (36.6 °C)] 97.4 °F (36.3 °C)  Pulse:  [44-91] 62  Resp:  [18-20] 20  SpO2:  [95 %-98 %] 98 %  BP: (104-129)/(56-64) 104/56     Weight: 84.4 kg (186 lb)  Body mass index is 25.94 kg/m².    Intake/Output Summary (Last 24 hours) at 10/10/2023 1520  Last data filed at 10/10/2023 1341  Gross per 24 hour   Intake 720 ml   Output 100 ml   Net 620 ml         Physical Exam  HENT:      Head: Normocephalic.      Mouth/Throat:      Mouth: Mucous membranes are moist.   Cardiovascular:      Rate and Rhythm: Normal rate and regular rhythm.      Pulses: Normal pulses.      Heart sounds: Normal heart sounds.   Pulmonary:      Effort: Pulmonary effort is normal. No respiratory distress.      Breath sounds: Normal breath sounds. No stridor. No wheezing or rhonchi.   Abdominal:      General: Bowel sounds are normal. There is  no distension.      Palpations: Abdomen is soft. There is no mass.      Tenderness: There is no abdominal tenderness. There is no guarding or rebound.      Hernia: No hernia is present.      Comments: nausea   Musculoskeletal:         General: Normal range of motion.      Cervical back: Normal range of motion.   Skin:     General: Skin is warm and dry.      Coloration: Skin is not jaundiced or pale.      Findings: No bruising, erythema or rash.      Comments: Lap. Surgery  sites - no signs of infection   Neurological:      Mental Status: He is alert and oriented to person, place, and time.      Motor: Weakness present.   Psychiatric:         Mood and Affect: Mood normal.             Significant Labs:   All pertinent labs within the past 24 hours have been reviewed.  Recent Lab Results       None          Significant Imaging: I have reviewed all pertinent imaging results/findings within the past 24 hours.

## 2023-10-10 NOTE — PLAN OF CARE
[facility] - Medical Surgical Unit - Swing Bed   Interdisciplinary Team Meeting    Patient: Joey King   Today's Date: 10/10/2023   Estimated D/C Date: 10/30/2023        Physician:  Dr. Jerod Urbina Nurse Practitioner: Edwardo Adair NP   Pharmacy: Melyssa Musa PharmD Unit Director: BRANDON Merchant   :Cinthya Avila RN Physical/Occupational Therapy:  Flavio Aguilar PT   Speech Therapy: NA Activity Therapy: Nesha Shane LPN   Nursing: BRANDON Merchant  Respiratory: Moon Daley, RT Dietary: Nam Randle  Other: Bridgett [facility]     Nursing  New Symptoms/Problems: N/A  Last Bowel Movement: 10/10/23  Urine: incontinent Diarrhea: No   Constipated: No Bowel: continent Rogers: No   Isolation: No     Device (Oxygen Therapy): room air    SpO2: 95 %    Diet/Nutrition Received: low saturated fat/low cholesterol  Speech/Swallowing: No current speech or swallowing issues  Aspiration Precautions: No  Cognition: Memory issues    Physical Therapy  Physical Therapy/Gait: unable on eval ELOS: Plan to DC     Transfers: Minimal Assistance Range of Motion/Restrictions: N/A     Occupational Therapy  Eating/Grooming: Standby Assistance Toileting: Moderate Assistance to Max   Bathing: Moderate Assistance to Max Dressing (Upper Body): Maximum Assistance   Dressing (Lower Body): Maximum Assistance      Activity Therapy  Level of participation: Active participation      Tx Plan/Recommendations reviewed with family and/or patient on (date) 10/09/2023.  Additional family Conference/Training: In home safety  D/C Plan/Recommendations: Home with   DAVID:     Pharmacy  Medication Changes (see MD orders in chart): Yes  MD/NP: Edwardo Adair NP Labs Reviewed: Yes New Lab Orders: Yes   Lab Concerns: Na+

## 2023-10-10 NOTE — HOSPITAL COURSE
10/10/23 complains of nausea. States occurred after eating salad. Abdomen soft , non distended. Bowel sounds present. Will give zofran and monitor.    10/10/23 complains of chest pain at level 10 pointed across chest, had some vomiting, states felt a little better after vomiting. States pain went  down to level 8 after vomiting. EKG done- shows Sinus rhythm with 1 st degree AV block. prolonged QT.   Labs pending.   Had NTG x 3, stated chest pain was better slightly after each dose but pain would return to original level of 8-9.   1641 states he has passed some gas and pain level is now 4-5. Troponin is 9.8.   Will check KUB. Shows gaseous distention . Checked for fecal impaction. None present. Had large BM.   States pain is around 4-5 , reports having some belching.  Will trend troponin and EKG.  NA level is 125- start NS at 100 cc/ hr x 5 hours.    10/10/23 - RRT called at approx 2305, patient having CP with vomiting coffee ground emesis. Patient awake and alert, complains of pain in his chest but is mildly tender over incision site in right quadrant. Orders placed including Protonix IV, IV fluids and additional labs. Will also place transfer order at this time.     10/11/23 - Patient accepted to [facility].

## 2023-10-10 NOTE — CODE/ RAPID DOCUMENTATION
Attempted to placed 22 gauge to left AC unsuccessful. 20 gauge placed to right hand. Blood drawn. Flush with 10cc NS. Tolerated well

## 2023-10-10 NOTE — PLAN OF CARE
[facility] - Medical Surgical Unit  Discharge Assessment    Primary Care Provider: Prashanth Ryan, DO     Discharge Assessment (most recent)       BRIEF DISCHARGE ASSESSMENT - 10/10/23 8031          Discharge Planning    Assessment Type Discharge Planning Brief Assessment (P)                      Spoke with staff at Dr. Quezada's office regarding appointment for 3 months follow up.  I was told since he was in the hospital, lets do 7-10 days because that's what Dr. Quezada usually do.  Appointment scheduled at this time for 10/18/2023

## 2023-10-10 NOTE — PT/OT/SLP PROGRESS
Occupational Therapy   Treatment    Name: Joey King  MRN: [id number]  Admitting Diagnosis:  Muscle weakness (generalized)       Recommendations:     Discharge Recommendations: home with home health, outpatient PT  Discharge Equipment Recommendations:  walker, rolling  Barriers to discharge:  None    Assessment:     Joey King is a 83 y.o. male with a medical diagnosis of Muscle weakness (generalized).  He presents with weakness and confusion. Patient required moderate encouragement to participate with skilled OT today. Performance deficits affecting function are weakness, impaired endurance, impaired functional mobility, impaired balance, decreased lower extremity function, impaired cardiopulmonary response to activity.     Rehab Prognosis:  Good; patient would benefit from acute skilled OT services to address these deficits and reach maximum level of function.       Plan:     Patient to be seen 5 x/week to address the above listed problems via self-care/home management, therapeutic activities, therapeutic exercises  Plan of Care Expires:    Plan of Care Reviewed with: patient    Subjective     Chief Complaint: Pain and weakness  Patient/Family Comments/goals:  to return to prior level of function  Pain/Comfort:  Pain Rating 1: 0/10  Pain Rating Post-Intervention 1: 0/10    Objective:     Communicated with: Nurse prior to session.  Patient found supine with bed alarm upon OT entry to room.    General Precautions: Standard, fall    Orthopedic Precautions:N/A  Braces: N/A  Respiratory Status: Room air     Occupational Performance:     Bed Mobility:    Patient completed Rolling/Turning to Left with  minimum assistance  Patient completed Rolling/Turning to Right with minimum assistance  Patient completed Scooting/Bridging with minimum assistance  Patient completed Supine to Sit with minimum assistance  Patient completed Sit to Supine with stand by assistance     Functional  Mobility/Transfers:  Patient completed Sit <> Stand Transfer with minimum assistance  with  rolling walker   Functional Mobility: patient performed sit to stand x 2 trials and stepped to head of bed with increased time and effort after taking rest break from bed mobility. Patient very weak and demonstrated poor endurance with activities today.     Activities of Daily Living:  Upper Body Dressing: minimum assistance to pradeep gown  Lower Body Dressing: total assistance to pradeep socks      AMPAC 6 Click ADL:      Treatment & Education:  Patient deferred all exercises today but stats he will try tomorrow.     Patient left HOB elevated with all lines intact, call button in reach, and daughter present    GOALS:   Multidisciplinary Problems       Occupational Therapy Goals          Problem: Occupational Therapy    Goal Priority Disciplines Outcome Interventions   Occupational Therapy Goal     OT, PT/OT Ongoing, Progressing    Description: Grooming Status:   Short Term Goal: Pt will perform grooming with SBA sitting EOB.   Long Term Goal: Pt will perform grooming/oral hygiene standing at sink with Mod I     LE dressing Status:   Short Term Goal: Pt will perform LE dressing with SBA.   Long Term Goal: Pt will perform LE dressing with Mod I.    Toileting Status:   Short Term Goal: Pt will perform toilet hygiene on BSC with Mod I.  Long Term Goal: Pt will perform toilet hygiene on toilet with no AE with Mod I.    Commode Transfer:   Short Term Goal: Pt will perform BSC t/f with Mod I.  Long Term Goal:  Pt will perform toilet t/f in bathroom with Mod I.     Bathing Status:   Long Term Goal: Pt will perform sponge bath with Mod I with no unsafe fatigue.     Strength Status: 5/5 BUEs  Long Term Goal: Pt to perform BUE strengthening with weights and/or body weight to increase ADL independence and safety    Endurance Status:   Short Term Goal:pt to perform 15 min OT treatment with 5 or greater rest breaks  Long Term Goal: pt to  perform 30 min OT treat with 3 or less rest breaks                          Time Tracking:     OT Date of Treatment: 10/10/23  OT Start Time: 1400  OT Stop Time: 1420  OT Total Time (min): 20 min    Billable Minutes:Therapeutic Activity 20 min      KAREN Varela/L, CSRS  OT/ALBA: OT          10/10/2023

## 2023-10-11 VITALS
OXYGEN SATURATION: 99 % | BODY MASS INDEX: 26.04 KG/M2 | SYSTOLIC BLOOD PRESSURE: 159 MMHG | WEIGHT: 186 LBS | TEMPERATURE: 98 F | HEART RATE: 97 BPM | HEIGHT: 71 IN | DIASTOLIC BLOOD PRESSURE: 94 MMHG | RESPIRATION RATE: 18 BRPM

## 2023-10-11 PROBLEM — K92.2 ACUTE UPPER GI BLEED: Status: ACTIVE | Noted: 2023-10-11

## 2023-10-11 LAB — CK MB SERPL-MCNC: 1.4 NG/ML (ref 1–3.6)

## 2023-10-11 NOTE — NURSING
"Attempted to contact patient's daughter twice. Left a message requesting she call back when she received the message. Spoke to patient about not being able to contact daughter. Patient states, "It is ok."     0055 Called [facility] - requested transport     0105- report given to [facility].    0130- [facility] arrived to transport patient.   "

## 2023-10-11 NOTE — SUBJECTIVE & OBJECTIVE
Interval History: Nausea and vomiting this evening with chest pain.  Pressure upper left quadrant lower chest.      Review of Systems  Objective:     Vital Signs (Most Recent):  Temp: 98.1 °F (36.7 °C) (10/10/23 1901)  Pulse: 87 (10/10/23 1901)  Resp: 20 (10/10/23 1901)  BP: (!) 156/91 (10/10/23 1901)  SpO2: 99 % (10/10/23 1901) Vital Signs (24h Range):  Temp:  [97.4 °F (36.3 °C)-98.1 °F (36.7 °C)] 98.1 °F (36.7 °C)  Pulse:  [] 87  Resp:  [18-26] 20  SpO2:  [95 %-100 %] 99 %  BP: (102-168)/() 156/91     Weight: 84.4 kg (186 lb)  Body mass index is 25.94 kg/m².    Intake/Output Summary (Last 24 hours) at 10/10/2023 2145  Last data filed at 10/10/2023 1854  Gross per 24 hour   Intake 482 ml   Output --   Net 482 ml         Physical Exam  Vitals reviewed.   Constitutional:       Appearance: He is ill-appearing. He is not toxic-appearing.   HENT:      Head: Normocephalic and atraumatic.   Cardiovascular:      Rate and Rhythm: Normal rate and regular rhythm.      Heart sounds: Normal heart sounds.   Pulmonary:      Effort: Pulmonary effort is normal. No respiratory distress.      Breath sounds: Normal breath sounds.   Abdominal:      General: Abdomen is flat. There is no distension.      Palpations: Abdomen is soft.      Tenderness: There is no abdominal tenderness. There is no guarding.   Skin:     General: Skin is warm and dry.   Neurological:      Mental Status: He is alert.             Significant Labs: All pertinent labs within the past 24 hours have been reviewed.  BMP:   Recent Labs   Lab 10/09/23  0400 10/10/23  0613   GLU 92 118*   * 125*   K 4.2 4.0   CL 98 94*   CO2 24 21   BUN 17 15   CREATININE 0.90 1.01   CALCIUM 8.3* 8.9   MG 1.8  --      CBC:   Recent Labs   Lab 10/09/23  0400 10/10/23  0613   WBC 7.08 8.50   HGB 12.2* 13.5   HCT 35.1* 39.3*    349       Significant Imaging: I have reviewed all pertinent imaging results/findings within the past 24 hours. Increase gas in the area  of discomfort.

## 2023-10-11 NOTE — ASSESSMENT & PLAN NOTE
Patient has hyponatremia which is uncontrolled,We will aim to correct the sodium by 4-6mEq in 24 hours.  Suspect SIADH possibly related to abilify. Tonights N/V not likely helping.  Will stop NS. Get serum osmolality. Urine osm and urine Na. Also will check TSH.     Recent Labs   Lab 10/10/23  0613   *

## 2023-10-11 NOTE — PLAN OF CARE
[facility] - Medical Surgical Unit  Discharge Final Note    Primary Care Provider: Prashanth Ryan DO    Expected Discharge Date: 10/11/2023    Final Discharge Note (most recent)       Final Note - 10/11/23 0657          Final Note    Assessment Type Final Discharge Note (P)      Anticipated Discharge Disposition -- (P)    transfered to [facility]       Post-Acute Status    Discharge Delays None known at this time (P)                      Important Message from Medicare             Contact Info       Dr. Arvind Quezada    [facility] [place] [id number] [phone]       Next Steps: Follow up on 10/18/2023    Instructions: at 1120    Sree Tolbert MD   Specialty: General Surgery    [address] [place] [address] [facility]  [facility] [address]   Phone: [phone]       Next Steps: Follow up on 10/26/2023    Instructions: at 0900        Mr. King was transferred back to [facility] for higher level of care.

## 2023-10-11 NOTE — DISCHARGE SUMMARY
[facility] - Medical Surgical Unit  Hospital Medicine  Discharge Summary      Patient Name: Joey King  MRN: [id number]  OTF: [id number]  Patient Class: IP- Swing  Admission Date: 10/6/2023  Hospital Length of Stay: 5 days  Discharge Date and Time:  10/11/2023 12:16 AM  Attending Physician: Jerod Urbina Jr., MD   Discharging Provider: JERO Jacob  Primary Care Provider: Prashanth Ryan DO    Primary Care Team: Networked reference to record PCT     HPI:   Mr. Joey King is an 83 year old male with pmh of paroxysmal afib with chronic anticoagulation with eliquis, hx of GI bleed, (09/2021) PE, DVT, htn, Chronic diastolic CHF, BPH, HLD, hypothyroidism, depression, MARCELO, GERD and anxiety. PCP is Dr. Prashanth Ryan. Mr. King was admitted to [facility] on 09/22-. He had lap. Cholecystectomy with Dr. Sree Tolbert on 09/23/23. Discharged from [facility] to swing bed here at [facility] on 10/06/23 for continued therapy. Mr. King lives alone, has home health services. States he was getting around with cane before hospitalization. Plans to return home at discharge.      * No surgery found *      Hospital Course:   10/10/23 complains of nausea. States occurred after eating salad. Abdomen soft , non distended. Bowel sounds present. Will give zofran and monitor.    10/10/23 complains of chest pain at level 10 pointed across chest, had some vomiting, states felt a little better after vomiting. States pain went  down to level 8 after vomiting. EKG done- shows Sinus rhythm with 1 st degree AV block. prolonged QT.   Labs pending.   Had NTG x 3, stated chest pain was better slightly after each dose but pain would return to original level of 8-9.   1641 states he has passed some gas and pain level is now 4-5. Troponin is 9.8.   Will check KUB. Shows gaseous distention . Checked for fecal impaction. None present. Had large BM.   States pain is around 4-5 , reports  having some belching.  Will trend troponin and EKG.  NA level is 125- start NS at 100 cc/ hr x 5 hours.    10/10/23 - RRT called at approx 2305, patient having CP with vomiting coffee ground emesis. Patient awake and alert, complains of pain in his chest but is mildly tender over incision site in right quadrant. Orders placed including Protonix IV, IV fluids and additional labs. Will also place transfer order at this time.     10/11/23 - Patient accepted to [facility].        Goals of Care Treatment Preferences:  Code Status: Full Code      Consults:   Consults (From admission, onward)        Status Ordering Provider     Inpatient consult to Registered Dietitian/Nutritionist  Once        Provider:  (Not yet assigned)    Acknowledged MARIA TERESA BARBER     IP consult to case management  Once        Provider:  (Not yet assigned)    Acknowledged MARIA TERESA BARBER          No new Assessment & Plan notes have been filed under this hospital service since the last note was generated.  Service: Hospital Medicine    Final Active Diagnoses:    Diagnosis Date Noted POA    PRINCIPAL PROBLEM:  Muscle weakness (generalized) [M62.81] 10/06/2023 Yes    Acute upper GI bleed [K92.2] 10/11/2023 Unknown    Nausea [R11.0] 10/10/2023 Unknown    Hyponatremia [E87.1] 10/10/2023 Unknown    Chronic diastolic CHF (congestive heart failure) [I50.32] 10/09/2023 Yes    Anxiety [F41.9] 10/09/2023 Yes    Essential hypertension [I10] 10/23/2022 Yes    Gastroesophageal reflux disease without esophagitis [K21.9] 10/23/2022 Yes    Paroxysmal atrial fibrillation [I48.0] 10/23/2022 Yes    Depression [F32.A] 10/23/2022 Yes    Hypothyroidism [E03.9] 10/23/2022 Yes    MARCELO (obstructive sleep apnea) [G47.33] 10/23/2022 Yes    Benign prostatic hyperplasia [N40.0] 05/14/2021 Yes      Problems Resolved During this Admission:       Discharged Condition: stable    Disposition:     Follow Up:   Follow-up Information     Dr. Samuels  Damien Follow up on 10/18/2023.    Why: at 1120  Contact information:  [facility] [place] [id number] [place] [address]    [phone]           Sree Tolbert MD Follow up on 10/26/2023.    Specialty: General Surgery  Why: at 0900  Contact information:  [address] [place] [address] [facility]  [place] [address]  [phone]                       Patient Instructions:   No discharge procedures on file.    Significant Diagnostic Studies: Labs: All labs within the past 24 hours have been reviewed    Pending Diagnostic Studies:     Procedure Component Value Units Date/Time    CK-MB [[id number]] Collected: 10/10/23 0613    Order Status: Sent Lab Status: In process Updated: 10/10/23 1642    Specimen: Blood          Medications:  Reconciled Home Medications:      Medication List      ASK your doctor about these medications    ARIPiprazole 5 MG Tab  Commonly known as: ABILIFY  1 tablet.     aspirin 81 MG EC tablet  Commonly known as: ECOTRIN  TAKE ONE TABLET BY MOUTH DAILY TO PREVENT BLOOD CLOT     citalopram 40 MG tablet  Commonly known as: CeleXA  TAKE ONE TABLET BY MOUTH DAILY FOR DEPRESSION     cloNIDine 0.1 MG tablet  Commonly known as: CATAPRES  TAKE ONE TABLET BY MOUTH DAILY FOR HIGH BLOOD PRESSURE     cyanocobalamin 100 MCG tablet  Commonly known as: VITAMIN B-12  Take 1,000 mcg by mouth.     flecainide 150 MG Tab  Commonly known as: TAMBOCOR  Take 1 tablet (150 mg total) by mouth every 12 (twelve) hours.     levothyroxine 100 MCG tablet  Commonly known as: SYNTHROID  TAKE ONE TABLET BY MOUTH DAILY IN THE MORNING FOR THYROID REPLACEMENT     melatonin  Commonly known as: MELATIN  Take 3 mg by mouth nightly as needed.     mirabegron 50 mg Tb24  Commonly known as: MYRBETRIQ  Take 1 tablet by mouth once daily.     MULTIVITAMIN 50 PLUS ORAL     omega-3 fatty acids-fish oil 340-1,000 mg Cap  Take 1 capsule by mouth.     omeprazole 40 MG capsule  Commonly known as: PRILOSEC  40  mg.     oxybutynin 15 MG Tr24  Commonly known as: DITROPAN XL  Take 1 tablet by mouth once daily.     polyethylene glycol 17 gram/dose powder  Commonly known as: GLYCOLAX  Take 17 g by mouth.     pyridoxine (vitamin B6) 50 MG Tab  Commonly known as: B-6  Take 100 mg by mouth.     vitamin D 1000 units Tab  Commonly known as: VITAMIN D3  TAKE ONE TABLET BY MOUTH DAILY (CHOLECALCIFEROL SAME AS VITAMIN D)     vitamin E 400 UNIT capsule  1 capsule.     zinc 50 mg Tab  1 tablet.     zolpidem 10 mg Tab  Commonly known as: AMBIEN  Take 10 mg by mouth.            Indwelling Lines/Drains at time of discharge:   Lines/Drains/Airways     None                 Time spent on the discharge of patient: 45 minutes         JERO Jacob  Department of Hospital Medicine  [facility] - Medical Surgical Unit

## 2023-10-11 NOTE — PROGRESS NOTES
[facility] - Medical Surgical Unit  Hospital Medicine  Progress Note    Patient Name: Joey King  MRN: [id number]  Patient Class: IP- Swing   Admission Date: 10/6/2023  Length of Stay: 4 days  Attending Physician: Jreod Urbina Jr., MD  Primary Care Provider: Prashanth Ryan DO        Subjective:     Principal Problem:Muscle weakness (generalized)        HPI:  Mr. Joey King is an 83 year old male with pmh of paroxysmal afib with chronic anticoagulation with eliquis, hx of GI bleed, (09/2021) PE, DVT, htn, Chronic diastolic CHF, BPH, HLD, hypothyroidism, depression, MARCELO, GERD and anxiety. PCP is Dr. Prashanth Ryan. Mr. King was admitted to [facility] on 09/22-. He had lap. Cholecystectomy with Dr. Sree Tolbert on 09/23/23. Discharged from [facility] to swing bed here at [facility] on 10/06/23 for continued therapy. Mr. King lives alone, has home health services. States he was getting around with cane before hospitalization. Plans to return home at discharge.      Overview/Hospital Course:  10/10/23 complains of nausea. States occurred after eating salad. Abdomen soft , non distended. Bowel sounds present. Will give zofran and monitor.    10/10/23 complains of chest pain at level 10 pointed across chest, had some vomiting, states felt a little better after vomiting. States pain went  down to level 8 after vomiting. EKG done- shows Sinus rhythm with 1 st degree AV block. prolonged QT.   Labs pending.   Had NTG x 3, stated chest pain was better slightly after each dose but pain would return to original level of 8-9.   1641 states he has passed some gas and pain level is now 4-5. Troponin is 9.8.   Will check KUB. Shows gaseous distention . Checked for fecal impaction. None present. Had large BM.   States pain is around 4-5 , reports having some belching.  Will trend troponin and EKG.  NA level is 125- start NS at 100 cc/ hr x 5 hours.        Interval  History: Nausea and vomiting this evening with chest pain.  Pressure upper left quadrant lower chest.      Review of Systems  Objective:     Vital Signs (Most Recent):  Temp: 98.1 °F (36.7 °C) (10/10/23 1901)  Pulse: 87 (10/10/23 1901)  Resp: 20 (10/10/23 1901)  BP: (!) 156/91 (10/10/23 1901)  SpO2: 99 % (10/10/23 1901) Vital Signs (24h Range):  Temp:  [97.4 °F (36.3 °C)-98.1 °F (36.7 °C)] 98.1 °F (36.7 °C)  Pulse:  [] 87  Resp:  [18-26] 20  SpO2:  [95 %-100 %] 99 %  BP: (102-168)/() 156/91     Weight: 84.4 kg (186 lb)  Body mass index is 25.94 kg/m².    Intake/Output Summary (Last 24 hours) at 10/10/2023 2145  Last data filed at 10/10/2023 1854  Gross per 24 hour   Intake 482 ml   Output --   Net 482 ml         Physical Exam  Vitals reviewed.   Constitutional:       Appearance: He is ill-appearing. He is not toxic-appearing.   HENT:      Head: Normocephalic and atraumatic.   Cardiovascular:      Rate and Rhythm: Normal rate and regular rhythm.      Heart sounds: Normal heart sounds.   Pulmonary:      Effort: Pulmonary effort is normal. No respiratory distress.      Breath sounds: Normal breath sounds.   Abdominal:      General: Abdomen is flat. There is no distension.      Palpations: Abdomen is soft.      Tenderness: There is no abdominal tenderness. There is no guarding.   Skin:     General: Skin is warm and dry.   Neurological:      Mental Status: He is alert.             Significant Labs: All pertinent labs within the past 24 hours have been reviewed.  BMP:   Recent Labs   Lab 10/09/23  0400 10/10/23  0613   GLU 92 118*   * 125*   K 4.2 4.0   CL 98 94*   CO2 24 21   BUN 17 15   CREATININE 0.90 1.01   CALCIUM 8.3* 8.9   MG 1.8  --      CBC:   Recent Labs   Lab 10/09/23  0400 10/10/23  0613   WBC 7.08 8.50   HGB 12.2* 13.5   HCT 35.1* 39.3*    349       Significant Imaging: I have reviewed all pertinent imaging results/findings within the past 24 hours. Increase gas in the area of  discomfort.       Assessment/Plan:      * Muscle weakness (generalized)  10/09/23 PT and OT to evaluate and treat        Nausea    With some chest pain.  EKG with no ischemic changes.  Troponin x 2 nl at 9.8.      Hyponatremia  Patient has hyponatremia which is uncontrolled,We will aim to correct the sodium by 4-6mEq in 24 hours.  Suspect SIADH possibly related to abilify. Tonights N/V not likely helping.  Will stop NS. Get serum osmolality. Urine osm and urine Na. Also will check TSH.     Recent Labs   Lab 10/10/23  0613   *       Chronic diastolic CHF (congestive heart failure)  Patient is identified as having Diastolic (HFpEF) heart failure that is Chronic. CHF is currently controlled. Latest ECHO performed and demonstrates- No results found for this or any previous visit.  . Continue Beta Blocker and monitor clinical status closely. Monitor on telemetry. Patient is on CHF pathway.  Monitor strict Is&Os and daily weights. 10/09/23 stable, daily weights, intake and output    Paroxysmal atrial fibrillation  Patient with Paroxysmal (<7 days) atrial fibrillation which is controlled currently with Beta Blocker. Patient is currently in sinus rhythm.[id number] Score: 2.    10/09/23 eliquis 5 mg po BID   Continue flecainide    Essential hypertension  10/09/23 continue losartan, metoprolol and clonidine      Anxiety  10/09/23 continue celexa      Depression  10/09/23 continue abilify and celexa        MARCELO (obstructive sleep apnea)  10/09/23 uses cpap at hs - family member to bring       Hypothyroidism  10/09/23 continue levothyroxine 100 mcg po daily      Gastroesophageal reflux disease without esophagitis  10/09/23 continue protonix      Benign prostatic hyperplasia  10/09/23 continue oxybutnin         VTE Risk Mitigation (From admission, onward)         Ordered     Place ULISES hose  Until discontinued         10/07/23 0658     apixaban tablet 5 mg  2 times daily         10/06/23 1740                Discharge  Planning   DAVID:      Code Status: Full Code   Is the patient medically ready for discharge?:     Reason for patient still in hospital (select all that apply): Treatment  Discharge Plan A: Home, Home Health                  Jerod Urbina Jr, MD  Department of Hospital Medicine   [facility]

## 2023-10-13 ENCOUNTER — HOSPITAL ENCOUNTER (INPATIENT)
Facility: HOSPITAL | Age: 83
LOS: 16 days | Discharge: ANOTHER HOSPITAL | DRG: 948 | End: 2023-10-29
Attending: FAMILY MEDICINE | Admitting: FAMILY MEDICINE
Payer: MEDICARE

## 2023-10-13 DIAGNOSIS — R07.9 CHEST PAIN AT REST: ICD-10-CM

## 2023-10-13 DIAGNOSIS — K31.89 ACUTE GASTRIC VOLVULUS: ICD-10-CM

## 2023-10-13 DIAGNOSIS — F41.9 ANXIETY: ICD-10-CM

## 2023-10-13 DIAGNOSIS — R53.1 GENERALIZED WEAKNESS: ICD-10-CM

## 2023-10-13 DIAGNOSIS — R07.9 CHEST PAIN: ICD-10-CM

## 2023-10-13 DIAGNOSIS — K21.9 GASTROESOPHAGEAL REFLUX DISEASE WITHOUT ESOPHAGITIS: ICD-10-CM

## 2023-10-13 DIAGNOSIS — I10 ESSENTIAL HYPERTENSION: ICD-10-CM

## 2023-10-13 DIAGNOSIS — I48.0 PAROXYSMAL ATRIAL FIBRILLATION: ICD-10-CM

## 2023-10-13 DIAGNOSIS — E03.9 HYPOTHYROIDISM, UNSPECIFIED TYPE: ICD-10-CM

## 2023-10-13 DIAGNOSIS — R53.1 WEAKNESS: Primary | ICD-10-CM

## 2023-10-13 DIAGNOSIS — Z87.19 H/O: GI BLEED: ICD-10-CM

## 2023-10-13 DIAGNOSIS — I50.32 CHRONIC DIASTOLIC CHF (CONGESTIVE HEART FAILURE): ICD-10-CM

## 2023-10-13 PROCEDURE — 11000004 HC SNF PRIVATE

## 2023-10-13 PROCEDURE — 25000003 PHARM REV CODE 250: Performed by: NURSE PRACTITIONER

## 2023-10-13 RX ORDER — AMOXICILLIN 250 MG
1 CAPSULE ORAL 2 TIMES DAILY
Status: DISCONTINUED | OUTPATIENT
Start: 2023-10-13 | End: 2023-10-29 | Stop reason: HOSPADM

## 2023-10-13 RX ORDER — VITAMIN E MIXED 400 UNIT
400 CAPSULE ORAL DAILY
Status: DISCONTINUED | OUTPATIENT
Start: 2023-10-14 | End: 2023-10-29 | Stop reason: HOSPADM

## 2023-10-13 RX ORDER — ZOLPIDEM TARTRATE 5 MG/1
5 TABLET ORAL NIGHTLY PRN
Status: DISCONTINUED | OUTPATIENT
Start: 2023-10-13 | End: 2023-10-16

## 2023-10-13 RX ORDER — TALC
6 POWDER (GRAM) TOPICAL NIGHTLY PRN
Status: DISCONTINUED | OUTPATIENT
Start: 2023-10-13 | End: 2023-10-29 | Stop reason: HOSPADM

## 2023-10-13 RX ORDER — GLUCOSAM/CHONDRO/HERB 149/HYAL 750-100 MG
1 TABLET ORAL DAILY
Status: DISCONTINUED | OUTPATIENT
Start: 2023-10-14 | End: 2023-10-29 | Stop reason: HOSPADM

## 2023-10-13 RX ORDER — LANOLIN ALCOHOL/MO/W.PET/CERES
100 CREAM (GRAM) TOPICAL DAILY
Status: DISCONTINUED | OUTPATIENT
Start: 2023-10-14 | End: 2023-10-29 | Stop reason: HOSPADM

## 2023-10-13 RX ORDER — CITALOPRAM 20 MG/1
40 TABLET, FILM COATED ORAL DAILY
Status: DISCONTINUED | OUTPATIENT
Start: 2023-10-14 | End: 2023-10-29 | Stop reason: HOSPADM

## 2023-10-13 RX ORDER — ARIPIPRAZOLE 5 MG/1
5 TABLET ORAL DAILY
Status: DISCONTINUED | OUTPATIENT
Start: 2023-10-13 | End: 2023-10-29 | Stop reason: HOSPADM

## 2023-10-13 RX ORDER — UBIDECARENONE 75 MG
1000 CAPSULE ORAL DAILY
Status: DISCONTINUED | OUTPATIENT
Start: 2023-10-14 | End: 2023-10-29 | Stop reason: HOSPADM

## 2023-10-13 RX ORDER — POLYETHYLENE GLYCOL 3350 17 G/17G
17 POWDER, FOR SOLUTION ORAL DAILY
Status: DISCONTINUED | OUTPATIENT
Start: 2023-10-14 | End: 2023-10-29 | Stop reason: HOSPADM

## 2023-10-13 RX ORDER — CLONIDINE HYDROCHLORIDE 0.1 MG/1
0.1 TABLET ORAL DAILY
Status: DISCONTINUED | OUTPATIENT
Start: 2023-10-14 | End: 2023-10-29 | Stop reason: HOSPADM

## 2023-10-13 RX ORDER — ZINC SULFATE 50(220)MG
220 CAPSULE ORAL DAILY
Status: DISCONTINUED | OUTPATIENT
Start: 2023-10-14 | End: 2023-10-18

## 2023-10-13 RX ORDER — OXYBUTYNIN CHLORIDE 5 MG/1
15 TABLET, EXTENDED RELEASE ORAL DAILY
Status: DISCONTINUED | OUTPATIENT
Start: 2023-10-14 | End: 2023-10-29 | Stop reason: HOSPADM

## 2023-10-13 RX ORDER — CHOLECALCIFEROL (VITAMIN D3) 25 MCG
1000 TABLET ORAL DAILY
Status: DISCONTINUED | OUTPATIENT
Start: 2023-10-14 | End: 2023-10-29 | Stop reason: HOSPADM

## 2023-10-13 RX ORDER — LEVOTHYROXINE SODIUM 100 UG/1
100 TABLET ORAL DAILY
Status: DISCONTINUED | OUTPATIENT
Start: 2023-10-14 | End: 2023-10-16

## 2023-10-13 RX ORDER — CALCIUM CARBONATE 200(500)MG
500 TABLET,CHEWABLE ORAL 2 TIMES DAILY PRN
Status: DISCONTINUED | OUTPATIENT
Start: 2023-10-13 | End: 2023-10-29 | Stop reason: HOSPADM

## 2023-10-13 RX ORDER — IBUPROFEN 200 MG
1 TABLET ORAL DAILY PRN
Status: DISCONTINUED | OUTPATIENT
Start: 2023-10-13 | End: 2023-10-29 | Stop reason: HOSPADM

## 2023-10-13 RX ORDER — FLECAINIDE ACETATE 50 MG/1
150 TABLET ORAL EVERY 12 HOURS
Status: DISCONTINUED | OUTPATIENT
Start: 2023-10-13 | End: 2023-10-29 | Stop reason: HOSPADM

## 2023-10-13 RX ORDER — ACETAMINOPHEN 325 MG/1
650 TABLET ORAL EVERY 6 HOURS PRN
Status: DISCONTINUED | OUTPATIENT
Start: 2023-10-13 | End: 2023-10-29 | Stop reason: HOSPADM

## 2023-10-13 RX ORDER — ASPIRIN 81 MG/1
81 TABLET ORAL NIGHTLY
Status: DISCONTINUED | OUTPATIENT
Start: 2023-10-13 | End: 2023-10-29 | Stop reason: HOSPADM

## 2023-10-13 RX ORDER — TALC
3 POWDER (GRAM) TOPICAL NIGHTLY PRN
Status: DISCONTINUED | OUTPATIENT
Start: 2023-10-13 | End: 2023-10-16

## 2023-10-13 RX ORDER — PANTOPRAZOLE SODIUM 40 MG/1
40 TABLET, DELAYED RELEASE ORAL DAILY
Status: DISCONTINUED | OUTPATIENT
Start: 2023-10-14 | End: 2023-10-29 | Stop reason: HOSPADM

## 2023-10-13 RX ORDER — ONDANSETRON 4 MG/1
4 TABLET, ORALLY DISINTEGRATING ORAL EVERY 8 HOURS PRN
Status: DISCONTINUED | OUTPATIENT
Start: 2023-10-13 | End: 2023-10-18

## 2023-10-13 RX ADMIN — ARIPIPRAZOLE 5 MG: 5 TABLET ORAL at 09:10

## 2023-10-13 RX ADMIN — SENNOSIDES AND DOCUSATE SODIUM 1 TABLET: 50; 8.6 TABLET ORAL at 09:10

## 2023-10-13 RX ADMIN — FLECAINIDE ACETATE 150 MG: 50 TABLET ORAL at 09:10

## 2023-10-13 RX ADMIN — ASPIRIN 81 MG: 81 TABLET, COATED ORAL at 09:10

## 2023-10-13 NOTE — ASSESSMENT & PLAN NOTE
10/13/23 records from [facility] show recent GI bleed - had EGD that [id number] mm bleeding  Taty Colon tear- had 2 hemastatic clips applied. No further bleeding   H&H stable 9/27   GI recommends holding eliquis for another week- resume then if no further bleeding

## 2023-10-13 NOTE — HPI
Mr. Joey King is an 83 year old male with pmh of htn, afib, CHF,anxiety,depression,PE in Feb.2023, EGRD, hypothyroidism,cholecystectomy 09/23/23.He is s/p an observation stay at [facility]. EGD shows Taty Colon Tear . Had 2 hemostatic clips applied. He had no further bleeding. Records show H&H is 9/27. He has been accepted back to swing bed at [facility].     Mr. King was admitted to [facility] on 10/13/2023 for continued therapy.

## 2023-10-13 NOTE — ASSESSMENT & PLAN NOTE
10/13/23continue flecanide  monitor on telemetry   eliquis on hold for recent gi bleed- can resume elquis in one week if no further bleeding

## 2023-10-14 LAB
BILIRUB UR QL STRIP: NEGATIVE
CLARITY UR: CLEAR
COLOR UR: YELLOW
GLUCOSE UR STRIP-MCNC: NEGATIVE MG/DL
KETONES UR STRIP-SCNC: NEGATIVE MG/DL
LEUKOCYTE ESTERASE UR QL STRIP: NEGATIVE
NITRITE UR QL STRIP: NEGATIVE
PH UR STRIP: 6.5 PH UNITS
PROT UR QL STRIP: NEGATIVE
RBC # UR STRIP: NEGATIVE /UL
SP GR UR STRIP: 1.01
UROBILINOGEN UR STRIP-ACNC: 1 MG/DL

## 2023-10-14 PROCEDURE — 99900035 HC TECH TIME PER 15 MIN (STAT)

## 2023-10-14 PROCEDURE — 25000003 PHARM REV CODE 250: Performed by: NURSE PRACTITIONER

## 2023-10-14 PROCEDURE — 94761 N-INVAS EAR/PLS OXIMETRY MLT: CPT

## 2023-10-14 PROCEDURE — 27000982 HC MATTRESS, MATRIX LAL RENTAL

## 2023-10-14 PROCEDURE — 11000004 HC SNF PRIVATE

## 2023-10-14 PROCEDURE — 27000944

## 2023-10-14 PROCEDURE — 81003 URINALYSIS AUTO W/O SCOPE: CPT | Performed by: NURSE PRACTITIONER

## 2023-10-14 RX ADMIN — FLECAINIDE ACETATE 150 MG: 50 TABLET ORAL at 08:10

## 2023-10-14 RX ADMIN — ZINC SULFATE 220 MG (50 MG) CAPSULE 220 MG: CAPSULE at 08:10

## 2023-10-14 RX ADMIN — LEVOTHYROXINE SODIUM 100 MCG: 0.1 TABLET ORAL at 05:10

## 2023-10-14 RX ADMIN — CITALOPRAM HYDROBROMIDE 40 MG: 20 TABLET ORAL at 08:10

## 2023-10-14 RX ADMIN — THERA TABS 1 TABLET: TAB at 08:10

## 2023-10-14 RX ADMIN — OMEGA-3 FATTY ACIDS CAP 1000 MG 1 CAPSULE: 1000 CAP at 08:10

## 2023-10-14 RX ADMIN — CLONIDINE HYDROCHLORIDE 0.1 MG: 0.1 TABLET ORAL at 05:10

## 2023-10-14 RX ADMIN — CYANOCOBALAMIN TAB 500 MCG 1000 MCG: 500 TAB at 08:10

## 2023-10-14 RX ADMIN — ACETAMINOPHEN 650 MG: 325 TABLET ORAL at 05:10

## 2023-10-14 RX ADMIN — ARIPIPRAZOLE 5 MG: 5 TABLET ORAL at 08:10

## 2023-10-14 RX ADMIN — SENNOSIDES AND DOCUSATE SODIUM 1 TABLET: 50; 8.6 TABLET ORAL at 08:10

## 2023-10-14 RX ADMIN — Medication 400 UNITS: at 08:10

## 2023-10-14 RX ADMIN — Medication 100 MG: at 08:10

## 2023-10-14 RX ADMIN — OXYBUTYNIN CHLORIDE 15 MG: 5 TABLET, EXTENDED RELEASE ORAL at 08:10

## 2023-10-14 RX ADMIN — PANTOPRAZOLE SODIUM 40 MG: 40 TABLET, DELAYED RELEASE ORAL at 08:10

## 2023-10-14 RX ADMIN — CHOLECALCIFEROL TAB 25 MCG (1000 UNIT) 1000 UNITS: 25 TAB at 08:10

## 2023-10-14 RX ADMIN — ASPIRIN 81 MG: 81 TABLET, COATED ORAL at 08:10

## 2023-10-14 RX ADMIN — MELATONIN TAB 3 MG 6 MG: 3 TAB at 08:10

## 2023-10-14 NOTE — PROGRESS NOTES
[facility] - [facility] Surgical Unit  Hospital Medicine  Progress Note    Patient Name: Joey King  MRN: [id number]  Patient Class: IP- Swing   Admission Date: 10/13/2023  Length of Stay: 0 days  Attending Physician: Jerod Urbina Jr., MD  Primary Care Provider: Prashanth Ryan DO        Subjective:     Principal Problem:Weakness        HPI:  Mr. Joey King is an 83 year old male with pmh of htn, afib, CHF,anxiety,depression,PE in Feb.2023, EGRD, hypothyroidism,cholecystectomy 09/23/23.He is s/p an observation stay at [facility]. EGD shows Taty Colon Tear . Had 2 hemostatic clips applied. He had no further bleeding. Records show H&H is 9/27. He has been accepted back to swing bed at [facility]. His arrival is expected today.      Overview/Hospital Course:  Mr. King is 83 year old white male who was admitted [facility] for swing bed services for therapy.  He was received from [facility] in [place], following an admission for UGIB secondary to Taty-Colon tear.  He was seen by GI at [place] who performed EGD to confirm this diagnosis.  GI placed hemostatic clips for the bleed.  Pt is on long term use of Eliquis due to history of Afib and PE.  GI recommended holding his Eliquis for an additional week.  At that time, if there are no signs of GIB, it can be restarted.  He has no complaints voiced tonight.       Interval History: Patient was admitted [facility] for swing bed admission for therapy.      Review of Systems   Constitutional:  Negative for fatigue and fever.   Respiratory:  Negative for cough and shortness of breath.    Cardiovascular:  Negative for chest pain.   Gastrointestinal:  Negative for abdominal pain, anal bleeding, blood in stool, diarrhea, nausea and vomiting.   Genitourinary:  Negative for hematuria.     Objective:     Vital Signs (Most Recent):    Vital Signs (24h Range):           There is no height or weight on file to calculate BMI.  No intake or  output data in the 24 hours ending 10/13/23 2005      Physical Exam  Constitutional:       General: He is awake. He is not in acute distress.     Appearance: Normal appearance. He is well-developed, well-groomed and normal weight. He is ill-appearing. He is not toxic-appearing or diaphoretic.   HENT:      Head: Normocephalic and atraumatic.   Cardiovascular:      Rate and Rhythm: Normal rate and regular rhythm.      Pulses: Normal pulses.      Heart sounds: Normal heart sounds. No murmur heard.  Pulmonary:      Effort: Pulmonary effort is normal. No respiratory distress.      Breath sounds: Normal breath sounds. No stridor. No wheezing, rhonchi or rales.   Abdominal:      General: Abdomen is flat.      Palpations: Abdomen is soft.      Tenderness: There is no abdominal tenderness.   Musculoskeletal:      Cervical back: Normal range of motion. No rigidity.   Skin:     General: Skin is warm and dry.      Capillary Refill: Capillary refill takes less than 2 seconds.   Neurological:      General: No focal deficit present.      Mental Status: He is alert and oriented to person, place, and time.   Psychiatric:         Mood and Affect: Mood normal.         Behavior: Behavior is cooperative.             Significant Labs: All pertinent labs within the past 24 hours have been reviewed.    Significant Imaging: I have reviewed all pertinent imaging results/findings within the past 24 hours.      Assessment/Plan:      * Weakness  10/13/23 PT and OT to evaluate and treat      H/O: GI bleed    10/13/23 records from [facility] show recent GI bleed - had EGD that [id number] mm bleeding  Taty Colon tear- had 2 hemastatic clips applied. No further bleeding   H&H stable 9/27   GI recommends holding eliquis for another week- resume then if no further bleeding     Chronic diastolic CHF (congestive heart failure)  10/13/23   Daily weights  Intake and Output      Depression  10/13/23 continue celexa 40 mg po daily  Continue abilify 5 mg  po daily        Paroxysmal atrial fibrillation  10/13/23continue flecanide  monitor on telemetry   eliquis on hold for recent gi bleed- can resume elquis in one week if no further bleeding    Hypothyroidism    10/13/23 continue levothyroxine   100 mcg po daily    Gastroesophageal reflux disease without esophagitis  10/13/23 continue protonix 40 mg podaily      Essential hypertension  10/13/23 continue clonidone 0.1 mg po daily      VTE Risk Mitigation (From admission, onward)    None          Discharge Planning   DAVID:      Code Status: Prior   Is the patient medically ready for discharge?:     Reason for patient still in hospital (select all that apply): Patient trending condition, Treatment and PT / OT recommendations                     JERO Mejia  Department of Hospital Medicine   [facility]

## 2023-10-14 NOTE — HOSPITAL COURSE
Mr. King is 83 year old white male who was admitted [facility] for swing bed services for therapy.  He was received from [facility] in [place], following an admission for UGIB secondary to Taty-Colon tear.  He was seen by GI at [place] who performed EGD to confirm this diagnosis.  GI placed hemostatic clips for the bleed.  Pt is on long term use of Eliquis due to history of Afib and PE.  GI recommended holding his Eliquis for an additional week.  At that time, if there are no signs of GIB, it can be restarted.  He has no complaints voiced tonight.     0620: I was called by nursing staff after the patient had fallen.  He reports he was going to the bathroom, but was too weak.  He denies hitting his head and denies LOC.  Nursing staff reports they found him on the floor at the foot of the bed.  Pt is alert and oriented x3.  He denies pain.  FROM without pain to all 4 extremities.  Denies neck/back pain.  No obvious signs of soft tissue injury.      10/17/23 Awake and resting in bed without complaints. Daughter at bedside. Denies any bleeding. Continue with PT and OT .     10/18/23 (00:20)  Nursing staff report one episode of vomiting--patient c/o nausea earlier tonight and was given Zofran ODT. One episode of bilious emesis reported. Will start saline lock and administer protonix and promethazine.     10/18/23 (0450) Elevated BP Reported. Continues to feel nauseated at times. Denies headache, chest pain, diaphoresis, or dizziness. Advised to administer Hydralazine 5mg IVP and monitor BP--will titrate med as needed.     10/18/23 Awake and resting in bed. Reports he started vomiting around midnight. Denies having any dark coffee ground emesis or any signs of blood. Denies abdominal pain. Denies chest pain. Does have some belching. Complains of continued nausea. Will give zofran IV  and monitor. States he did not sleep any last night.    0854 no further nausea. States he is going to try and take a  nap.    0932 complains of abd and chest pain at level 8. No further nausea. Stat troponin and EKG ordered. He has had some belching this morning. Will also check KUB and give some simethicone.  Troponin is 12.9. EKG ST at 105, nonspecific ST and T wave abn.    1200 Had norco @ 1119. States pain is about a 4-5. Reports passing some gas. Repeat troponin pending.   Vomitus sent for occult blood.    1253 troponin 14- continues to have nausea. Will make npo, give fluids and treat nausea.    10/19/23 no further nausea or vomiting. Deneis chest pain. Has tolerated CL diet this morning. Will slowly advance diet as tolerated.    10/20/23 tolerating full liquids. Increased to dysphagia diet. No further reports of nausea or vomiting . No signs of bleeding . Will resume eliquis BID.    10/24/2023-patient seen this morning lying in bed with his eyes closed.  Patient is easily arousable and states that he is feeling much better.  Patient's nausea has passed at this time.  He is still on a dysphagia diet and I do not intend to change that day.  Review of most recent laboratory evaluation shows grossly within lab reference ranges or at baseline for patient.  We will continue to monitor and treat as appropriate.  We will continue to evaluate for discharge planning.    10/29/23 RN called to report the patient was having vomiting and chest pain.  Pt reports he began with the n/v, then developed the chest pain.  He reports pain is lower anterior chest and nonradiating. Denies SOB, palpitations, lightheadedness.  Pt recently has UGIB with Taty-Colon tear.  The tear require hemostatic clipping by GIMD.  Spoke to Dr Bonner regarding the patient.  Given his symptoms and hx, will trend his troponins, get EKG and CT chest.    10/29/23 CT shows concern for developing gastric volvulus.  Pt reports his n/v has resolved, but he is still in pain.  I spoke with Dr Bonner who has accepted the patient as transfer.  I also spoke with Dr Long, who will be  a consulting provider in this case.

## 2023-10-14 NOTE — SUBJECTIVE & OBJECTIVE
Interval History: Patient was admitted [facility] for swing bed admission for therapy.      Review of Systems   Constitutional:  Negative for fatigue and fever.   Respiratory:  Negative for cough and shortness of breath.    Cardiovascular:  Negative for chest pain.   Gastrointestinal:  Negative for abdominal pain, anal bleeding, blood in stool, diarrhea, nausea and vomiting.   Genitourinary:  Negative for hematuria.     Objective:     Vital Signs (Most Recent):    Vital Signs (24h Range):           There is no height or weight on file to calculate BMI.  No intake or output data in the 24 hours ending 10/13/23 2005      Physical Exam  Constitutional:       General: He is awake. He is not in acute distress.     Appearance: Normal appearance. He is well-developed, well-groomed and normal weight. He is ill-appearing. He is not toxic-appearing or diaphoretic.   HENT:      Head: Normocephalic and atraumatic.   Cardiovascular:      Rate and Rhythm: Normal rate and regular rhythm.      Pulses: Normal pulses.      Heart sounds: Normal heart sounds. No murmur heard.  Pulmonary:      Effort: Pulmonary effort is normal. No respiratory distress.      Breath sounds: Normal breath sounds. No stridor. No wheezing, rhonchi or rales.   Abdominal:      General: Abdomen is flat.      Palpations: Abdomen is soft.      Tenderness: There is no abdominal tenderness.   Musculoskeletal:      Cervical back: Normal range of motion. No rigidity.   Skin:     General: Skin is warm and dry.      Capillary Refill: Capillary refill takes less than 2 seconds.   Neurological:      General: No focal deficit present.      Mental Status: He is alert and oriented to person, place, and time.   Psychiatric:         Mood and Affect: Mood normal.         Behavior: Behavior is cooperative.             Significant Labs: All pertinent labs within the past 24 hours have been reviewed.    Significant Imaging: I have reviewed all pertinent imaging results/findings  within the past 24 hours.

## 2023-10-14 NOTE — PLAN OF CARE
Problem: Adult Inpatient Plan of Care  Goal: Patient-Specific Goal (Individualized)  Outcome: Ongoing, Progressing     Problem: Fall Injury Risk  Goal: Absence of Fall and Fall-Related Injury  Outcome: Ongoing, Progressing  Intervention: Promote Injury-Free Environment  Flowsheets (Taken 10/14/2023 0626)  Safety Promotion/Fall Prevention:   assistive device/personal item within reach   instructed to call staff for mobility   side rails raised x 2   nonskid shoes/socks when out of bed

## 2023-10-14 NOTE — PROGRESS NOTES
[facility] - [facility] Surgical [facility]  Hospital Medicine  Progress Note    Patient Name: Joey King  MRN: [id number]  Patient Class: IP- Swing   Admission Date: 10/13/2023  Length of Stay: 1 days  Attending Physician: Jerod Urbina Jr., MD  Primary Care Provider: Prashanth Ryan DO        Subjective:     Principal Problem:Weakness        HPI:  Mr. Joey King is an 83 year old male with pmh of htn, afib, CHF,anxiety,depression,PE in Feb.2023, EGRD, hypothyroidism,cholecystectomy 09/23/23.He is s/p an observation stay at [facility]. EGD shows Taty Colon Tear . Had 2 hemostatic clips applied. He had no further bleeding. Records show H&H is 9/27. He has been accepted back to swing bed at [facility]. His arrival is expected today.      Overview/Hospital Course:  Mr. King is 83 year old white male who was admitted [facility] for swing bed services for therapy.  He was received from [facility] in [place], following an admission for UGIB secondary to Taty-Colon tear.  He was seen by GI at [place] who performed EGD to confirm this diagnosis.  GI placed hemostatic clips for the bleed.  Pt is on long term use of Eliquis due to history of Afib and PE.  GI recommended holding his Eliquis for an additional week.  At that time, if there are no signs of GIB, it can be restarted.  He has no complaints voiced tonight.     0620: I was called by nursing staff after the patient had fallen.  He reports he was going to the bathroom, but was too weak.  He denies hitting his head and denies LOC.  Nursing staff reports they found him on the floor at the foot of the bed.  Pt is alert and oriented x3.  He denies pain.  FROM without pain to all 4 extremities.  Denies neck/back pain.  No obvious signs of soft tissue injury.        No new subjective & objective note has been filed under this hospital service since the last note was generated.      Assessment/Plan:      * Weakness  10/13/23 PT and  OT to evaluate and treat      H/O: GI bleed    10/13/23 records from [facility] show recent GI bleed - had EGD that [id number] mm bleeding  Taty Colon tear- had 2 hemastatic clips applied. No further bleeding   H&H stable 9/27   GI recommends holding eliquis for another week- resume then if no further bleeding     Chronic diastolic CHF (congestive heart failure)  10/13/23   Daily weights  Intake and Output      Depression  10/13/23 continue celexa 40 mg po daily  Continue abilify 5 mg po daily        Paroxysmal atrial fibrillation  10/13/23continue flecanide  monitor on telemetry   eliquis on hold for recent gi bleed- can resume elquis in one week if no further bleeding    Hypothyroidism    10/13/23 continue levothyroxine   100 mcg po daily    Gastroesophageal reflux disease without esophagitis  10/13/23 continue protonix 40 mg podaily      Essential hypertension  10/13/23 continue clonidone 0.1 mg po daily      VTE Risk Mitigation (From admission, onward)         Ordered     IP VTE LOW RISK PATIENT  Once         10/13/23 2008     Place ULISES hose  Until discontinued         10/13/23 2008                Discharge Planning   DAVID:      Code Status: Full Code   Is the patient medically ready for discharge?:     Reason for patient still in hospital (select all that apply): Patient trending condition and PT / OT recommendations                     JERO Mejia  Department of Hospital Medicine   [facility]   Clindamycin Pregnancy And Lactation Text: This medication can be used in pregnancy if certain situations. Clindamycin is also present in breast milk.

## 2023-10-14 NOTE — PROGRESS NOTES
Consult received. Noted recent Cholecystectomy and UGIB with Taty bryant.  RDN contacted facility to discuss pt with nursing.  Intake fair. Current diet: cardiac. No diff swallowing.  Noted orders for MVM and ZnSO4.  Rec 1. One carton boost vanilla po BiD. RDN will f/u upon return to facility for further assessment.

## 2023-10-15 PROCEDURE — 94761 N-INVAS EAR/PLS OXIMETRY MLT: CPT

## 2023-10-15 PROCEDURE — 11000004 HC SNF PRIVATE

## 2023-10-15 PROCEDURE — 27000944

## 2023-10-15 PROCEDURE — 27000982 HC MATTRESS, MATRIX LAL RENTAL

## 2023-10-15 PROCEDURE — 25000003 PHARM REV CODE 250: Performed by: NURSE PRACTITIONER

## 2023-10-15 RX ADMIN — CHOLECALCIFEROL TAB 25 MCG (1000 UNIT) 1000 UNITS: 25 TAB at 10:10

## 2023-10-15 RX ADMIN — LEVOTHYROXINE SODIUM 100 MCG: 0.1 TABLET ORAL at 05:10

## 2023-10-15 RX ADMIN — Medication 100 MG: at 10:10

## 2023-10-15 RX ADMIN — ARIPIPRAZOLE 5 MG: 5 TABLET ORAL at 10:10

## 2023-10-15 RX ADMIN — ASPIRIN 81 MG: 81 TABLET, COATED ORAL at 09:10

## 2023-10-15 RX ADMIN — THERA TABS 1 TABLET: TAB at 10:10

## 2023-10-15 RX ADMIN — OXYBUTYNIN CHLORIDE 15 MG: 5 TABLET, EXTENDED RELEASE ORAL at 10:10

## 2023-10-15 RX ADMIN — PANTOPRAZOLE SODIUM 40 MG: 40 TABLET, DELAYED RELEASE ORAL at 10:10

## 2023-10-15 RX ADMIN — SENNOSIDES AND DOCUSATE SODIUM 1 TABLET: 50; 8.6 TABLET ORAL at 10:10

## 2023-10-15 RX ADMIN — SENNOSIDES AND DOCUSATE SODIUM 1 TABLET: 50; 8.6 TABLET ORAL at 09:10

## 2023-10-15 RX ADMIN — Medication 400 UNITS: at 10:10

## 2023-10-15 RX ADMIN — CLONIDINE HYDROCHLORIDE 0.1 MG: 0.1 TABLET ORAL at 05:10

## 2023-10-15 RX ADMIN — OMEGA-3 FATTY ACIDS CAP 1000 MG 1 CAPSULE: 1000 CAP at 10:10

## 2023-10-15 RX ADMIN — MELATONIN TAB 3 MG 6 MG: 3 TAB at 09:10

## 2023-10-15 RX ADMIN — FLECAINIDE ACETATE 150 MG: 50 TABLET ORAL at 10:10

## 2023-10-15 RX ADMIN — ZINC SULFATE 220 MG (50 MG) CAPSULE 220 MG: CAPSULE at 10:10

## 2023-10-15 RX ADMIN — FLECAINIDE ACETATE 150 MG: 50 TABLET ORAL at 09:10

## 2023-10-15 RX ADMIN — CITALOPRAM HYDROBROMIDE 40 MG: 20 TABLET ORAL at 10:10

## 2023-10-15 RX ADMIN — CYANOCOBALAMIN TAB 500 MCG 1000 MCG: 500 TAB at 10:10

## 2023-10-15 NOTE — PLAN OF CARE
Problem: Adult Inpatient Plan of Care  Goal: Patient-Specific Goal (Individualized)  Outcome: Ongoing, Progressing     Problem: Fall Injury Risk  Goal: Absence of Fall and Fall-Related Injury  Outcome: Ongoing, Progressing  Intervention: Promote Injury-Free Environment  Flowsheets (Taken 10/15/2023 5727)  Safety Promotion/Fall Prevention:   assistive device/personal item within reach   bed alarm set   side rails raised x 2   instructed to call staff for mobility

## 2023-10-16 LAB
ANION GAP SERPL CALCULATED.3IONS-SCNC: 10 MMOL/L (ref 7–16)
BASOPHILS # BLD AUTO: 0.03 K/UL (ref 0–0.2)
BASOPHILS NFR BLD AUTO: 0.5 % (ref 0–1)
BUN SERPL-MCNC: 7 MG/DL (ref 7–18)
BUN/CREAT SERPL: 7 (ref 6–20)
CALCIUM SERPL-MCNC: 8.5 MG/DL (ref 8.5–10.1)
CHLORIDE SERPL-SCNC: 100 MMOL/L (ref 98–107)
CO2 SERPL-SCNC: 27 MMOL/L (ref 21–32)
CREAT SERPL-MCNC: 1.04 MG/DL (ref 0.7–1.3)
DIFFERENTIAL METHOD BLD: ABNORMAL
EGFR (NO RACE VARIABLE) (RUSH/TITUS): 71 ML/MIN/1.73M2
EOSINOPHIL # BLD AUTO: 0.27 K/UL (ref 0–0.5)
EOSINOPHIL NFR BLD AUTO: 4.6 % (ref 1–4)
ERYTHROCYTE [DISTWIDTH] IN BLOOD BY AUTOMATED COUNT: 14.7 % (ref 11.5–14.5)
GLUCOSE SERPL-MCNC: 103 MG/DL (ref 74–106)
HCT VFR BLD AUTO: 31.3 % (ref 40–54)
HGB BLD-MCNC: 10.2 G/DL (ref 13.5–18)
LYMPHOCYTES # BLD AUTO: 1.27 K/UL (ref 1–4.8)
LYMPHOCYTES NFR BLD AUTO: 21.7 % (ref 27–41)
MAGNESIUM SERPL-MCNC: 1.7 MG/DL (ref 1.7–2.3)
MCH RBC QN AUTO: 30.9 PG (ref 27–31)
MCHC RBC AUTO-ENTMCNC: 32.6 G/DL (ref 32–36)
MCV RBC AUTO: 94.8 FL (ref 80–96)
MONOCYTES # BLD AUTO: 0.82 K/UL (ref 0–0.8)
MONOCYTES NFR BLD AUTO: 14 % (ref 2–6)
MPC BLD CALC-MCNC: 7.9 FL (ref 9.4–12.4)
NEUTROPHILS # BLD AUTO: 3.45 K/UL (ref 1.8–7.7)
NEUTROPHILS NFR BLD AUTO: 59.2 % (ref 53–65)
PHOSPHATE SERPL-MCNC: 2.7 MG/DL (ref 2.5–4.5)
PLATELET # BLD AUTO: 232 K/UL (ref 150–400)
POTASSIUM SERPL-SCNC: 3.1 MMOL/L (ref 3.5–5.1)
RBC # BLD AUTO: 3.3 M/UL (ref 4.6–6.2)
SODIUM SERPL-SCNC: 134 MMOL/L (ref 136–145)
WBC # BLD AUTO: 5.84 K/UL (ref 4.5–11)

## 2023-10-16 PROCEDURE — 83735 ASSAY OF MAGNESIUM: CPT | Performed by: NURSE PRACTITIONER

## 2023-10-16 PROCEDURE — 80048 BASIC METABOLIC PNL TOTAL CA: CPT | Performed by: NURSE PRACTITIONER

## 2023-10-16 PROCEDURE — 99305 1ST NF CARE MODERATE MDM 35: CPT | Mod: AI,,, | Performed by: FAMILY MEDICINE

## 2023-10-16 PROCEDURE — 99305 PR NURSING FACILITY CARE, INIT, MOD SEVERITY: ICD-10-PCS | Mod: AI,,, | Performed by: FAMILY MEDICINE

## 2023-10-16 PROCEDURE — 25000003 PHARM REV CODE 250: Performed by: FAMILY MEDICINE

## 2023-10-16 PROCEDURE — 84100 ASSAY OF PHOSPHORUS: CPT | Performed by: NURSE PRACTITIONER

## 2023-10-16 PROCEDURE — 85025 COMPLETE CBC W/AUTO DIFF WBC: CPT | Performed by: NURSE PRACTITIONER

## 2023-10-16 PROCEDURE — 25000003 PHARM REV CODE 250: Performed by: NURSE PRACTITIONER

## 2023-10-16 PROCEDURE — 27000982 HC MATTRESS, MATRIX LAL RENTAL

## 2023-10-16 PROCEDURE — 97166 OT EVAL MOD COMPLEX 45 MIN: CPT

## 2023-10-16 PROCEDURE — 97161 PT EVAL LOW COMPLEX 20 MIN: CPT

## 2023-10-16 PROCEDURE — 11000004 HC SNF PRIVATE

## 2023-10-16 PROCEDURE — 27000944

## 2023-10-16 PROCEDURE — 94761 N-INVAS EAR/PLS OXIMETRY MLT: CPT

## 2023-10-16 RX ORDER — LEVOTHYROXINE SODIUM 100 UG/1
100 TABLET ORAL DAILY
Status: DISCONTINUED | OUTPATIENT
Start: 2023-10-16 | End: 2023-10-16

## 2023-10-16 RX ORDER — LEVOTHYROXINE SODIUM 100 UG/1
100 TABLET ORAL
Status: COMPLETED | OUTPATIENT
Start: 2023-10-16 | End: 2023-10-16

## 2023-10-16 RX ORDER — POTASSIUM CHLORIDE 20 MEQ/1
40 TABLET, EXTENDED RELEASE ORAL ONCE
Status: COMPLETED | OUTPATIENT
Start: 2023-10-16 | End: 2023-10-16

## 2023-10-16 RX ORDER — LEVOTHYROXINE SODIUM 100 UG/1
100 TABLET ORAL
Status: DISCONTINUED | OUTPATIENT
Start: 2023-10-17 | End: 2023-10-29 | Stop reason: HOSPADM

## 2023-10-16 RX ORDER — SIMETHICONE 80 MG
1 TABLET,CHEWABLE ORAL 3 TIMES DAILY PRN
Status: DISCONTINUED | OUTPATIENT
Start: 2023-10-16 | End: 2023-10-29 | Stop reason: HOSPADM

## 2023-10-16 RX ORDER — MUPIROCIN 20 MG/G
OINTMENT TOPICAL 2 TIMES DAILY
Status: DISPENSED | OUTPATIENT
Start: 2023-10-16 | End: 2023-10-21

## 2023-10-16 RX ORDER — BISACODYL 5 MG
10 TABLET, DELAYED RELEASE (ENTERIC COATED) ORAL ONCE
Status: COMPLETED | OUTPATIENT
Start: 2023-10-16 | End: 2023-10-16

## 2023-10-16 RX ADMIN — SIMETHICONE 80 MG: 80 TABLET, CHEWABLE ORAL at 03:10

## 2023-10-16 RX ADMIN — POTASSIUM CHLORIDE 40 MEQ: 1500 TABLET, EXTENDED RELEASE ORAL at 09:10

## 2023-10-16 RX ADMIN — ASPIRIN 81 MG: 81 TABLET, COATED ORAL at 08:10

## 2023-10-16 RX ADMIN — BISACODYL 10 MG: 5 TABLET, COATED ORAL at 10:10

## 2023-10-16 RX ADMIN — ARIPIPRAZOLE 5 MG: 5 TABLET ORAL at 09:10

## 2023-10-16 RX ADMIN — MELATONIN TAB 3 MG 6 MG: 3 TAB at 08:10

## 2023-10-16 RX ADMIN — SENNOSIDES AND DOCUSATE SODIUM 1 TABLET: 50; 8.6 TABLET ORAL at 08:10

## 2023-10-16 RX ADMIN — FLECAINIDE ACETATE 150 MG: 50 TABLET ORAL at 09:10

## 2023-10-16 RX ADMIN — MUPIROCIN: 20 OINTMENT TOPICAL at 08:10

## 2023-10-16 RX ADMIN — CYANOCOBALAMIN TAB 500 MCG 1000 MCG: 500 TAB at 09:10

## 2023-10-16 RX ADMIN — Medication 100 MG: at 09:10

## 2023-10-16 RX ADMIN — THERA TABS 1 TABLET: TAB at 09:10

## 2023-10-16 RX ADMIN — LEVOTHYROXINE SODIUM 100 MCG: 0.1 TABLET ORAL at 01:10

## 2023-10-16 RX ADMIN — CHOLECALCIFEROL TAB 25 MCG (1000 UNIT) 1000 UNITS: 25 TAB at 09:10

## 2023-10-16 RX ADMIN — SENNOSIDES AND DOCUSATE SODIUM 1 TABLET: 50; 8.6 TABLET ORAL at 09:10

## 2023-10-16 RX ADMIN — ZINC SULFATE 220 MG (50 MG) CAPSULE 220 MG: CAPSULE at 09:10

## 2023-10-16 RX ADMIN — FLECAINIDE ACETATE 150 MG: 50 TABLET ORAL at 08:10

## 2023-10-16 RX ADMIN — OXYBUTYNIN CHLORIDE 15 MG: 5 TABLET, EXTENDED RELEASE ORAL at 09:10

## 2023-10-16 RX ADMIN — POLYETHYLENE GLYCOL 3350 17 G: 17 POWDER, FOR SOLUTION ORAL at 09:10

## 2023-10-16 RX ADMIN — Medication 400 UNITS: at 09:10

## 2023-10-16 RX ADMIN — MUPIROCIN: 20 OINTMENT TOPICAL at 09:10

## 2023-10-16 RX ADMIN — PANTOPRAZOLE SODIUM 40 MG: 40 TABLET, DELAYED RELEASE ORAL at 09:10

## 2023-10-16 RX ADMIN — CLONIDINE HYDROCHLORIDE 0.1 MG: 0.1 TABLET ORAL at 05:10

## 2023-10-16 RX ADMIN — OMEGA-3 FATTY ACIDS CAP 1000 MG 1 CAPSULE: 1000 CAP at 09:10

## 2023-10-16 RX ADMIN — CITALOPRAM HYDROBROMIDE 40 MG: 20 TABLET ORAL at 09:10

## 2023-10-16 NOTE — H&P
[facility] - Medical Surgical Unit  Hospital Medicine  History & Physical    Patient Name: Joey King  MRN: [id number]  Patient Class: IP- Swing  Admission Date: 10/13/2023  Attending Physician: Len Palacios IV, DO   Primary Care Provider: Prashanth Ryan DO         Patient information was obtained from patient, past medical records and ER records.     Subjective:     Principal Problem:Weakness    Chief Complaint:   Chief Complaint   Patient presents with    Weakness        HPI: Mr. Joey King is an 83 year old male with pmh of htn, afib, CHF,anxiety,depression,PE in Feb.2023, EGRD, hypothyroidism,cholecystectomy 09/23/23.He is s/p an observation stay at [facility]. EGD shows Taty Colon Tear . Had 2 hemostatic clips applied. He had no further bleeding. Records show H&H is 9/27. He has been accepted back to swing bed at [facility].     Mr. King was admitted to [facility] on 10/13/2023 for continued therapy.           Past Medical History:   Diagnosis Date    Back injury     CHF (congestive heart failure)     Hypertension     Thyroid disease        Past Surgical History:   Procedure Laterality Date    CHOLECYSTECTOMY      SHOULDER SURGERY      THYROID SURGERY         Review of patient's allergies indicates:  No Known Allergies    No current facility-administered medications on file prior to encounter.     Current Outpatient Medications on File Prior to Encounter   Medication Sig    ARIPiprazole (ABILIFY) 5 MG Tab 1 tablet.    aspirin (ECOTRIN) 81 MG EC tablet TAKE ONE TABLET BY MOUTH DAILY TO PREVENT BLOOD CLOT    citalopram (CELEXA) 40 MG tablet TAKE ONE TABLET BY MOUTH DAILY FOR DEPRESSION    cloNIDine (CATAPRES) 0.1 MG tablet TAKE ONE TABLET BY MOUTH DAILY FOR HIGH BLOOD PRESSURE    cyanocobalamin (VITAMIN B-12) 100 MCG tablet Take 1,000 mcg by mouth.    flecainide (TAMBOCOR) 150 MG Tab Take 1 tablet (150 mg total) by mouth every 12 (twelve) hours.    levothyroxine  (SYNTHROID) 100 MCG tablet TAKE ONE TABLET BY MOUTH DAILY IN THE MORNING FOR THYROID REPLACEMENT    melatonin (MELATIN) Take 3 mg by mouth nightly as needed.    mirabegron (MYRBETRIQ) 50 mg Tb24 Take 1 tablet by mouth once daily.    multivit with minerals/lutein (MULTIVITAMIN 50 PLUS ORAL)     omega-3 fatty acids-fish oil 340-1,000 mg Cap Take 1 capsule by mouth.    omeprazole (PRILOSEC) 40 MG capsule 40 mg.    oxybutynin (DITROPAN XL) 15 MG TR24 Take 1 tablet by mouth once daily.    polyethylene glycol (GLYCOLAX) 17 gram/dose powder Take 17 g by mouth.    pyridoxine, vitamin B6, (B-6) 50 MG Tab Take 100 mg by mouth.    vitamin D (VITAMIN D3) 1000 units Tab TAKE ONE TABLET BY MOUTH DAILY (CHOLECALCIFEROL SAME AS VITAMIN D)    vitamin E 400 UNIT capsule 1 capsule.    zinc 50 mg Tab 1 tablet.    zolpidem (AMBIEN) 10 mg Tab Take 10 mg by mouth.     Family History    None       Tobacco Use    Smoking status: Never    Smokeless tobacco: Never   Substance and Sexual Activity    Alcohol use: Never    Drug use: Never    Sexual activity: Not Currently     Review of Systems   Constitutional:  Positive for appetite change. Negative for fatigue and unexpected weight change.        Reports poor appetite   Respiratory:  Negative for cough, chest tightness and shortness of breath.    Cardiovascular:  Negative for chest pain and palpitations.   Gastrointestinal:  Negative for abdominal pain, constipation, diarrhea and vomiting.        States last BM was 2 days ago   Genitourinary:  Negative for difficulty urinating and dysuria.   Musculoskeletal:  Negative for arthralgias.   Skin:  Negative for wound.   Neurological:  Positive for weakness. Negative for light-headedness and headaches.     Objective:     Vital Signs (Most Recent):  Temp: 98 °F (36.7 °C) (10/16/23 0730)  Pulse: 72 (10/16/23 0730)  Resp: 18 (10/16/23 0730)  BP: (!) 141/73 (10/16/23 0730)  SpO2: 97 % (10/16/23 0730) Vital Signs (24h Range):  Temp:  [98 °F (36.7  °C)-98.4 °F (36.9 °C)] 98 °F (36.7 °C)  Pulse:  [72-79] 72  Resp:  [18-20] 18  SpO2:  [96 %-98 %] 97 %  BP: (141-145)/(73-78) 141/73     Weight: 79.8 kg (175 lb 14.4 oz)  Body mass index is 24.53 kg/m².     Physical Exam  HENT:      Head: Normocephalic.      Mouth/Throat:      Mouth: Mucous membranes are moist.   Cardiovascular:      Rate and Rhythm: Rhythm irregular.      Pulses: Normal pulses.      Heart sounds: Normal heart sounds. No murmur heard.  Pulmonary:      Effort: Pulmonary effort is normal. No respiratory distress.      Breath sounds: Normal breath sounds. No stridor. No wheezing or rhonchi.   Abdominal:      General: Bowel sounds are normal. There is no distension.      Palpations: Abdomen is soft. There is no mass.      Tenderness: There is no abdominal tenderness. There is no guarding or rebound.      Hernia: No hernia is present.   Musculoskeletal:         General: No swelling, tenderness, deformity or signs of injury. Normal range of motion.   Skin:     General: Skin is warm and dry.      Coloration: Skin is not jaundiced or pale.      Findings: No bruising or erythema.   Neurological:      Mental Status: He is alert and oriented to person, place, and time.   Psychiatric:         Mood and Affect: Mood normal.                Significant Labs: All pertinent labs within the past 24 hours have been reviewed.  Recent Lab Results         10/16/23  0547        Anion Gap 10       Baso # 0.03       Basophil % 0.5       BUN 7       BUN/CREAT RATIO 7       Calcium 8.5       Chloride 100       CO2 27       Creatinine 1.04       Differential Method Auto       eGFR 71       Eos # 0.27       Eosinophil % 4.6       Glucose 103       Hematocrit 31.3       Hemoglobin 10.2       Lymph # 1.27       Lymph % 21.7       Magnesium  1.7       MCH 30.9       MCHC 32.6       MCV 94.8       Mono # 0.82       Mono % 14.0       MPV 7.9       Neutrophils, Abs 3.45       Neutrophils Relative 59.2       Phosphorus Level 2.7        Platelet Count 232       Potassium 3.1       RBC 3.30       RDW 14.7       Sodium 134       WBC 5.84               Significant Imaging: I have reviewed all pertinent imaging results/findings within the past 24 hours.    Assessment/Plan:     * Weakness  10/13/23 PT and OT to evaluate and treat      10/16/23 PT and OT to evaluate and treat    H/O: GI bleed    10/13/23 [facility] show recent GI bleed - had EGD that [id number] mm bleeding  Taty Colon tear- had 2 hemastatic clips applied. No further bleeding   H&H stable 9/27   GI recommends holding eliquis for another week- resume then if no further bleeding     10/16/23 h&h 10.2/31.3    Chronic diastolic CHF (congestive heart failure)  10/13/23   Daily weights  Intake and Output      10/16/23 stable    Paroxysmal atrial fibrillation  10/13/23   continue flecanide  monitor on telemetry   eliquis on hold for recent gi bleed- can resume elquis in one week if no further bleeding    Gastroesophageal reflux disease without esophagitis  10/13/23 continue protonix 40 mg podaily      Essential hypertension  10/13/23 continue clonidone 0.1 mg po daily      10/16/23 stable 140/70    Hypothyroidism    10/13/23 continue levothyroxine   100 mcg po daily    Depression  10/13/23 continue celexa 40 mg po daily  Continue abilify 5 mg po daily          VTE Risk Mitigation (From admission, onward)           Ordered     IP VTE LOW RISK PATIENT  Once         10/13/23 2008     Place ULISES hose  Until discontinued         10/13/23 2008                               Len Palacios IV, DO  Department of Hospital Medicine  [facility] - Medical Surgical Unit

## 2023-10-16 NOTE — PLAN OF CARE
Problem: Adult Inpatient Plan of Care  Goal: Patient-Specific Goal (Individualized)  Outcome: Ongoing, Progressing     Problem: Fall Injury Risk  Goal: Absence of Fall and Fall-Related Injury  Outcome: Ongoing, Progressing  Intervention: Promote Injury-Free Environment  Flowsheets (Taken 10/16/2023 0230)  Safety Promotion/Fall Prevention:   assistive device/personal item within reach   bed alarm set   instructed to call staff for mobility   side rails raised x 2

## 2023-10-16 NOTE — ASSESSMENT & PLAN NOTE
10/13/23   continue flecanide  monitor on telemetry   eliquis on hold for recent gi bleed- can resume elquis in one week if no further bleeding

## 2023-10-16 NOTE — PLAN OF CARE
Problem: Physical Therapy  Goal: Physical Therapy Goal  Description: Goals to be met by: 11/5/2023     Patient will increase functional independence with mobility by performing:    STG: Supine to sit with Stand-by Assistance  STG: Sit to supine with Stand-by Assistance  LTG: Sit to stand transfer with Modified [place]  LTG: Bed to chair transfer with Modified [place] using Rolling Walker  LTG: Gait  x 200 feet with Modified [place] using Rolling Walker.   LTG: Stand for 10 minutes with Modified [place] using Rolling Walker    Outcome: Ongoing, Progressing

## 2023-10-16 NOTE — PLAN OF CARE
[facility] - Medical Surgical Unit  Initial Discharge Assessment       Primary Care Provider: Prashanth Ryan DO    Admission Diagnosis: Generalized weakness [R53.1]    Admission Date: 10/13/2023  Expected Discharge Date:     Transition of Care Barriers: (P) None    Payor: MEDICARE / Plan: MEDICARE PART A & B / Product Type: Government /     Extended Emergency Contact Information  Primary Emergency Contact: Ashley King  Mobile Phone: [phone]  Relation: Daughter  Preferred language: English   needed? No    Discharge Plan A: (P) Home, Home Health         [facility] [address]  [address] [address]  Phone: [phone] Fax: [phone]    [address]  [address] [address]  Phone: [phone] Fax: [phone]      Initial Assessment (most recent)       Adult Discharge Assessment - 10/16/23 1335          Discharge Assessment    Assessment Type Discharge Planning Assessment (P)      Confirmed/corrected address, phone number and insurance Yes (P)      Confirmed Demographics Correct on Facesheet (P)      Source of Information patient (P)      Communicated DAVID with patient/caregiver Date not available/Unable to determine (P)      People in Home alone (P)      Do you expect to return to your current living situation? Yes (P)      Do you have help at home or someone to help you manage your care at home? Yes (P)      Who are your caregiver(s) and their phone number(s)? Ashley King [phone] (P)      Current cognitive status: Alert/Oriented (P)      Equipment Currently Used at Home bedside commode;cane, straight;walker, standard (P)      Patient currently being followed by outpatient case management? No (P)      Do you currently have service(s) that help you manage your care at home? Yes (P)    patient has home health but unsure of agency name    Is the  pt/caregiver preference to resume services with current agency Yes (P)      Do you take prescription medications? Yes (P)      Do you have prescription coverage? Yes (P)      Coverage Medicare A&B (P)      Do you have any problems affording any of your prescribed medications? No (P)      Is the patient taking medications as prescribed? yes (P)      Who is going to help you get home at discharge? Ashley King (P)      How do you get to doctors appointments? car, drives self;family or friend will provide (P)      Are you on dialysis? No (P)      Do you take coumadin? No (P)      DME Needed Upon Discharge  none (P)      Discharge Plan discussed with: Patient (P)      Transition of Care Barriers None (P)      Discharge Plan A Home;Home Health (P)                    Mr. King lives home alone and receives services through home health and has support from his daughter.  He is unsure of agency but his nurse name is Olivia. He has needed equipment at home and denies any needs at this time.  He plans to return home alone.     Mr. King is being followed by [facility].

## 2023-10-16 NOTE — PT/OT/SLP EVAL
Occupational Therapy   Evaluation    Name: Joey King  MRN: [id number]  Admitting Diagnosis: Weakness  Recent Surgery: * No surgery found *      Recommendations:     Discharge Recommendations: Moderate Intensity Therapy  Discharge Equipment Recommendations:  walker, rolling  Barriers to discharge:  None    Assessment:     Joey King is a 83 y.o. male with a medical diagnosis of Weakness.  He presents with weakness and decline with ADLs. Performance deficits affecting function: weakness, impaired endurance, impaired functional mobility, gait instability, impaired balance, decreased coordination, decreased lower extremity function, decreased safety awareness, impaired coordination.      Rehab Prognosis: Fair; patient would benefit from acute skilled OT services to address these deficits and reach maximum level of function.       Plan:     Patient to be seen 5 x/week to address the above listed problems via self-care/home management, therapeutic exercises, therapeutic activities  Plan of Care Expires:    Plan of Care Reviewed with: patient    Subjective     Chief Complaint: Pain and weakness  Patient/Family Comments/goals: to return to prior level of function    Occupational Profile:  Living Environment: Patient lives at home alone  Previous level of function: Independent with ADLs  Roles and Routines: Homemaker  Equipment Used at Home: walker, standard, cane, straight, bedside commode  Assistance upon Discharge: Recommend D/C to home with HH    Pain/Comfort:  Pain Rating 1: 0/10  Pain Rating Post-Intervention 1: 0/10    Patients cultural, spiritual, Baptism conflicts given the current situation: no    Objective:     Communicated with: Nurse prior to session.  Patient found supine with bed alarm upon OT entry to room.    General Precautions: Standard, fall  Orthopedic Precautions: N/A  Braces: N/A  Respiratory Status: Room air    Occupational Performance:    Bed Mobility:    Patient completed  Rolling/Turning to Left with  minimum assistance  Patient completed Rolling/Turning to Right with minimum assistance  Patient completed Scooting/Bridging with minimum assistance  Patient completed Supine to Sit with minimum assistance  Patient completed Sit to Supine with moderate assistance    Functional Mobility/Transfers:  Patient completed Sit <> Stand Transfer with minimum assistance and moderate assistance  with  rolling walker   Functional Mobility: n/a    Activities of Daily Living:  Upper Body Dressing: minimum assistance to [facility] gown as a robe  Lower Body Dressing: maximal assistance to pradeep socks    Cognitive/Visual Perceptual:  Cognitive/Psychosocial Skills:     -       Oriented to: Person, Place, and Situation   -       Follows Commands/attention:Follows two-step commands  -       Communication: clear/fluent  -       Safety awareness/insight to disability: impaired   Visual/Perceptual:      -Intact WFLs    Physical Exam:  Balance:    -       fair  Dominant hand:    -       right  Upper Extremity Range of Motion:     -       Right Upper Extremity: WFL  -       Left Upper Extremity: WFL  Upper Extremity Strength:    -       Right Upper Extremity: WFL  -       Left Upper Extremity: WFL  Fine Motor Coordination:    -       Intact  Gross motor coordination:   WFL    AMPAC 6 Click ADL:  AMPAC Total Score: 13    Treatment & Education:  Pt educated on OT role/POC.   Importance of OOB activity with staff assistance.  Importance of sitting up in the chair throughout the day as tolerated, especially for meals   Safety during functional t/f and mobility  Importance of assisting with self-care activities      Patient left supine with all lines intact and call button in reach    GOALS:   Multidisciplinary Problems       Occupational Therapy Goals          Problem: Occupational Therapy    Goal Priority Disciplines Outcome Interventions   Occupational Therapy Goal     OT, PT/OT Ongoing, Progressing     Description: Grooming Status:   Short Term Goal: Pt will perform grooming with SBA sitting EOB.   Long Term Goal: Pt will perform grooming/oral hygiene standing at sink with SBA      LE dressing Status:   Short Term Goal: Pt will perform LE dressing with SBA.   Long Term Goal: Pt will perform LE dressing with Mod I.    Toileting Status:   Short Term Goal: Pt will perform toilet hygiene on BSC with Mod I.  Long Term Goal: Pt will perform toilet hygiene on toilet with no AE with Mod I.    Commode Transfer:   Short Term Goal: Pt will perform BSC t/f with Mod I.  Long Term Goal:  Pt will perform toilet t/f in bathroom with Mod I.     Bathing Status:   Long Term Goal: Pt will perform sponge bath with Mod I with no unsafe fatigue.     Strength Status: 5/5 BUEs  Long Term Goal: Pt to perform BUE strengthening with weights and/or body weight to increase ADL independence and safety    Endurance Status:   Short Term Goal:pt to perform 15 min OT treatment with 5 or greater rest breaks  Long Term Goal: pt to perform 30 min OT treat with 3 or less rest breaks                          History:     Past Medical History:   Diagnosis Date    Back injury     CHF (congestive heart failure)     Hypertension     Thyroid disease          Past Surgical History:   Procedure Laterality Date    CHOLECYSTECTOMY      SHOULDER SURGERY      THYROID SURGERY         Time Tracking:     OT Date of Treatment: 10/16/23  OT Start Time: 1345  OT Stop Time: 1355  OT Total Time (min): 10 min    Billable Minutes:Evaluation Low complexity    KAREN Varela/L, CSRS  10/16/2023

## 2023-10-16 NOTE — ASSESSMENT & PLAN NOTE
10/13/23 records from [facility] show recent GI bleed - had EGD that [id number] mm bleeding  Taty Colon tear- had 2 hemastatic clips applied. No further bleeding   H&H stable 9/27   GI recommends holding eliquis for another week- resume then if no further bleeding     10/16/23 h&h 10.2/31.3

## 2023-10-16 NOTE — PT/OT/SLP EVAL
Physical Therapy Evaluation    Patient Name:  Joey King   MRN:  [id number]    Recommendations:     Discharge Recommendations:     Discharge Equipment Recommendations: walker, rolling   Barriers to discharge: Decreased caregiver support    Assessment:     Joey King is a 83 y.o. male admitted with a medical diagnosis of Weakness.  He presents with the following impairments/functional limitations: weakness, impaired endurance, impaired functional mobility, gait instability, impaired balance, decreased coordination, decreased lower extremity function, decreased safety awareness, impaired coordination.  Patient pleasant and cooperative for PT evaluation.  Participation in physical therapy evaluation limited by c/o dizziness, fatigue, weakness, and mild abdominal pain.  He will benefit from skilled physical therapy services to improve LE/core strength, improve balance, increase activity tolerance and for patient education to allow safe, independent functional mobility at home with low fall risk.     Rehab Prognosis: Good; patient would benefit from acute skilled PT services to address these deficits and reach maximum level of function.    Recent Surgery: * No surgery found *      Plan:     During this hospitalization, patient to be seen 5 x/week to address the identified rehab impairments via gait training, therapeutic activities, therapeutic exercises, neuromuscular re-education and progress toward the following goals:    Plan of Care Expires:  23    Subjective     Chief Complaint: Patient identified via name and .  Patient agrees to PT evaluation.  Patient previously receiving physical therapy at [facility] and then transferred to [facility] due to GI bleed.  Patient underwent EGD and 2 hemostatic clips placed.  He has now returned to swingbed due to increased weakness and decreased functional mobility.  Patient/Family Comments/goals: improve strength  Pain/Comfort:  Pain Rating 1:  0/10    Patients cultural, spiritual, Religion conflicts given the current situation: no    Living Environment:  Patient lives alone in a home with 6 steps to enter.   Prior to admission, patients level of function was independent.  Equipment used at home: walker, standard, cane, straight, bedside commode.  DME owned (not currently used): none.  Upon discharge, patient will have assistance from family.    Objective:     Communicated with medical record prior to session.  Patient found HOB elevated with bed alarm  upon PT entry to room.    General Precautions: Standard, fall  Orthopedic Precautions:    Braces: N/A  Respiratory Status: Room air    Exams:  RLE ROM: WFL  RLE Strength: observed greater than 3+/5 throughout  LLE ROM: WFL  LLE Strength: observed greater than 3+/5 throughout    Functional Mobility:  Bed Mobility:     Supine to Sit: moderate assistance  Sit to Supine: moderate assistance  Transfers:     Sit to Stand:  minimum assistance with rolling walker  Gait: Patient unable to ambulate at time of initial evaluation with c/o dizziness and fatigue upon standing  Balance: fair minus static/dynamic standing balance      AM-PAC 6 CLICK MOBILITY  Total Score:12       Treatment & Education:  Patient educated in PT evaluation findings, PT POC, safety, rehab techniques, and projected visit frequency.  Patient participated in setting up POC and goals.     Patient left HOB elevated with all lines intact, call button in reach, and bed alarm on.    GOALS:   Multidisciplinary Problems       Physical Therapy Goals          Problem: Physical Therapy    Goal Priority Disciplines Outcome Goal Variances Interventions   Physical Therapy Goal     PT, PT/OT Ongoing, Progressing     Description: Goals to be met by: 11/5/2023     Patient will increase functional independence with mobility by performing:    STG: Supine to sit with Stand-by Assistance  STG: Sit to supine with Stand-by Assistance  LTG: Sit to stand transfer  with Modified [place]  LTG: Bed to chair transfer with Modified [place] using Rolling Walker  LTG: Gait  x 200 feet with Modified [place] using Rolling Walker.   LTG: Stand for 10 minutes with Modified [place] using Rolling Walker                         History:     Past Medical History:   Diagnosis Date    Back injury     CHF (congestive heart failure)     Hypertension     Thyroid disease        Past Surgical History:   Procedure Laterality Date    CHOLECYSTECTOMY      SHOULDER SURGERY      THYROID SURGERY         Time Tracking:     PT Received On: 10/16/23  PT Start Time: 1242     PT Stop Time: 1302  PT Total Time (min): 20 min     Billable Minutes: Evaluation 20 minutes      10/16/2023

## 2023-10-16 NOTE — SUBJECTIVE & OBJECTIVE
Past Medical History:   Diagnosis Date    Back injury     CHF (congestive heart failure)     Hypertension     Thyroid disease        Past Surgical History:   Procedure Laterality Date    CHOLECYSTECTOMY      SHOULDER SURGERY      THYROID SURGERY         Review of patient's allergies indicates:  No Known Allergies    No current facility-administered medications on file prior to encounter.     Current Outpatient Medications on File Prior to Encounter   Medication Sig    ARIPiprazole (ABILIFY) 5 MG Tab 1 tablet.    aspirin (ECOTRIN) 81 MG EC tablet TAKE ONE TABLET BY MOUTH DAILY TO PREVENT BLOOD CLOT    citalopram (CELEXA) 40 MG tablet TAKE ONE TABLET BY MOUTH DAILY FOR DEPRESSION    cloNIDine (CATAPRES) 0.1 MG tablet TAKE ONE TABLET BY MOUTH DAILY FOR HIGH BLOOD PRESSURE    cyanocobalamin (VITAMIN B-12) 100 MCG tablet Take 1,000 mcg by mouth.    flecainide (TAMBOCOR) 150 MG Tab Take 1 tablet (150 mg total) by mouth every 12 (twelve) hours.    levothyroxine (SYNTHROID) 100 MCG tablet TAKE ONE TABLET BY MOUTH DAILY IN THE MORNING FOR THYROID REPLACEMENT    melatonin (MELATIN) Take 3 mg by mouth nightly as needed.    mirabegron (MYRBETRIQ) 50 mg Tb24 Take 1 tablet by mouth once daily.    multivit with minerals/lutein (MULTIVITAMIN 50 PLUS ORAL)     omega-3 fatty acids-fish oil 340-1,000 mg Cap Take 1 capsule by mouth.    omeprazole (PRILOSEC) 40 MG capsule 40 mg.    oxybutynin (DITROPAN XL) 15 MG TR24 Take 1 tablet by mouth once daily.    polyethylene glycol (GLYCOLAX) 17 gram/dose powder Take 17 g by mouth.    pyridoxine, vitamin B6, (B-6) 50 MG Tab Take 100 mg by mouth.    vitamin D (VITAMIN D3) 1000 units Tab TAKE ONE TABLET BY MOUTH DAILY (CHOLECALCIFEROL SAME AS VITAMIN D)    vitamin E 400 UNIT capsule 1 capsule.    zinc 50 mg Tab 1 tablet.    zolpidem (AMBIEN) 10 mg Tab Take 10 mg by mouth.     Family History    None       Tobacco Use    Smoking status: Never    Smokeless tobacco: Never   Substance and Sexual  Activity    Alcohol use: Never    Drug use: Never    Sexual activity: Not Currently     Review of Systems   Constitutional:  Positive for appetite change. Negative for fatigue and unexpected weight change.        Reports poor appetite   Respiratory:  Negative for cough, chest tightness and shortness of breath.    Cardiovascular:  Negative for chest pain and palpitations.   Gastrointestinal:  Negative for abdominal pain, constipation, diarrhea and vomiting.        States last BM was 2 days ago   Genitourinary:  Negative for difficulty urinating and dysuria.   Musculoskeletal:  Negative for arthralgias.   Skin:  Negative for wound.   Neurological:  Positive for weakness. Negative for light-headedness and headaches.     Objective:     Vital Signs (Most Recent):  Temp: 98 °F (36.7 °C) (10/16/23 0730)  Pulse: 72 (10/16/23 0730)  Resp: 18 (10/16/23 0730)  BP: (!) 141/73 (10/16/23 0730)  SpO2: 97 % (10/16/23 0730) Vital Signs (24h Range):  Temp:  [98 °F (36.7 °C)-98.4 °F (36.9 °C)] 98 °F (36.7 °C)  Pulse:  [72-79] 72  Resp:  [18-20] 18  SpO2:  [96 %-98 %] 97 %  BP: (141-145)/(73-78) 141/73     Weight: 79.8 kg (175 lb 14.4 oz)  Body mass index is 24.53 kg/m².     Physical Exam  HENT:      Head: Normocephalic.      Mouth/Throat:      Mouth: Mucous membranes are moist.   Cardiovascular:      Rate and Rhythm: Rhythm irregular.      Pulses: Normal pulses.      Heart sounds: Normal heart sounds. No murmur heard.  Pulmonary:      Effort: Pulmonary effort is normal. No respiratory distress.      Breath sounds: Normal breath sounds. No stridor. No wheezing or rhonchi.   Abdominal:      General: Bowel sounds are normal. There is no distension.      Palpations: Abdomen is soft. There is no mass.      Tenderness: There is no abdominal tenderness. There is no guarding or rebound.      Hernia: No hernia is present.   Musculoskeletal:         General: No swelling, tenderness, deformity or signs of injury. Normal range of motion.    Skin:     General: Skin is warm and dry.      Coloration: Skin is not jaundiced or pale.      Findings: No bruising or erythema.   Neurological:      Mental Status: He is alert and oriented to person, place, and time.   Psychiatric:         Mood and Affect: Mood normal.                Significant Labs: All pertinent labs within the past 24 hours have been reviewed.  Recent Lab Results         10/16/23  0547        Anion Gap 10       Baso # 0.03       Basophil % 0.5       BUN 7       BUN/CREAT RATIO 7       Calcium 8.5       Chloride 100       CO2 27       Creatinine 1.04       Differential Method Auto       eGFR 71       Eos # 0.27       Eosinophil % 4.6       Glucose 103       Hematocrit 31.3       Hemoglobin 10.2       Lymph # 1.27       Lymph % 21.7       Magnesium  1.7       MCH 30.9       MCHC 32.6       MCV 94.8       Mono # 0.82       Mono % 14.0       MPV 7.9       Neutrophils, Abs 3.45       Neutrophils Relative 59.2       Phosphorus Level 2.7       Platelet Count 232       Potassium 3.1       RBC 3.30       RDW 14.7       Sodium 134       WBC 5.84               Significant Imaging: I have reviewed all pertinent imaging results/findings within the past 24 hours.

## 2023-10-17 LAB
CK SERPL-CCNC: 38 U/L (ref 39–308)
TROPONIN I SERPL DL<=0.01 NG/ML-MCNC: 9.6 PG/ML

## 2023-10-17 PROCEDURE — 11000004 HC SNF PRIVATE

## 2023-10-17 PROCEDURE — 93005 ELECTROCARDIOGRAM TRACING: CPT

## 2023-10-17 PROCEDURE — 99309 SBSQ NF CARE MODERATE MDM 30: CPT | Mod: ,,, | Performed by: FAMILY MEDICINE

## 2023-10-17 PROCEDURE — 25000003 PHARM REV CODE 250: Performed by: NURSE PRACTITIONER

## 2023-10-17 PROCEDURE — 97110 THERAPEUTIC EXERCISES: CPT | Mod: CQ

## 2023-10-17 PROCEDURE — 82550 ASSAY OF CK (CPK): CPT | Performed by: NURSE PRACTITIONER

## 2023-10-17 PROCEDURE — 27000944

## 2023-10-17 PROCEDURE — 97110 THERAPEUTIC EXERCISES: CPT

## 2023-10-17 PROCEDURE — 94761 N-INVAS EAR/PLS OXIMETRY MLT: CPT

## 2023-10-17 PROCEDURE — 93010 ELECTROCARDIOGRAM REPORT: CPT | Mod: ,,, | Performed by: HOSPITALIST

## 2023-10-17 PROCEDURE — 84484 ASSAY OF TROPONIN QUANT: CPT | Performed by: NURSE PRACTITIONER

## 2023-10-17 PROCEDURE — 93010 EKG 12-LEAD: ICD-10-PCS | Mod: ,,, | Performed by: HOSPITALIST

## 2023-10-17 PROCEDURE — 99309 PR NURSING FAC CARE, SUBSEQ, SIGNIF COMPLIC: ICD-10-PCS | Mod: ,,, | Performed by: FAMILY MEDICINE

## 2023-10-17 PROCEDURE — 97530 THERAPEUTIC ACTIVITIES: CPT | Mod: CQ

## 2023-10-17 PROCEDURE — 27000982 HC MATTRESS, MATRIX LAL RENTAL

## 2023-10-17 RX ADMIN — MELATONIN TAB 3 MG 6 MG: 3 TAB at 08:10

## 2023-10-17 RX ADMIN — ONDANSETRON 4 MG: 4 TABLET, ORALLY DISINTEGRATING ORAL at 11:10

## 2023-10-17 RX ADMIN — FLECAINIDE ACETATE 150 MG: 50 TABLET ORAL at 08:10

## 2023-10-17 RX ADMIN — CYANOCOBALAMIN TAB 500 MCG 1000 MCG: 500 TAB at 08:10

## 2023-10-17 RX ADMIN — CALCIUM CARBONATE 500 MG: 500 TABLET, CHEWABLE ORAL at 09:10

## 2023-10-17 RX ADMIN — MUPIROCIN: 20 OINTMENT TOPICAL at 08:10

## 2023-10-17 RX ADMIN — PANTOPRAZOLE SODIUM 40 MG: 40 TABLET, DELAYED RELEASE ORAL at 08:10

## 2023-10-17 RX ADMIN — SENNOSIDES AND DOCUSATE SODIUM 1 TABLET: 50; 8.6 TABLET ORAL at 08:10

## 2023-10-17 RX ADMIN — CHOLECALCIFEROL TAB 25 MCG (1000 UNIT) 1000 UNITS: 25 TAB at 08:10

## 2023-10-17 RX ADMIN — CLONIDINE HYDROCHLORIDE 0.1 MG: 0.1 TABLET ORAL at 04:10

## 2023-10-17 RX ADMIN — CITALOPRAM HYDROBROMIDE 40 MG: 20 TABLET ORAL at 08:10

## 2023-10-17 RX ADMIN — ASPIRIN 81 MG: 81 TABLET, COATED ORAL at 08:10

## 2023-10-17 RX ADMIN — ZINC SULFATE 220 MG (50 MG) CAPSULE 220 MG: CAPSULE at 08:10

## 2023-10-17 RX ADMIN — Medication 400 UNITS: at 08:10

## 2023-10-17 RX ADMIN — Medication 100 MG: at 08:10

## 2023-10-17 RX ADMIN — ARIPIPRAZOLE 5 MG: 5 TABLET ORAL at 08:10

## 2023-10-17 RX ADMIN — LEVOTHYROXINE SODIUM 100 MCG: 0.1 TABLET ORAL at 05:10

## 2023-10-17 RX ADMIN — OMEGA-3 FATTY ACIDS CAP 1000 MG 1 CAPSULE: 1000 CAP at 08:10

## 2023-10-17 RX ADMIN — SIMETHICONE 80 MG: 80 TABLET, CHEWABLE ORAL at 10:10

## 2023-10-17 RX ADMIN — THERA TABS 1 TABLET: TAB at 08:10

## 2023-10-17 RX ADMIN — OXYBUTYNIN CHLORIDE 15 MG: 5 TABLET, EXTENDED RELEASE ORAL at 08:10

## 2023-10-17 NOTE — ASSESSMENT & PLAN NOTE
10/13/23 PT and OT to evaluate and treat      10/16/23 PT and OT to evaluate and treat    10/17/23 continue PT and OT

## 2023-10-17 NOTE — SUBJECTIVE & OBJECTIVE
Interval History: weakness    Review of Systems   Constitutional:  Positive for appetite change. Negative for fatigue and unexpected weight change.        Reports poor appetite   Respiratory:  Negative for cough, chest tightness and shortness of breath.    Cardiovascular:  Negative for chest pain and palpitations.   Gastrointestinal:  Negative for abdominal pain, constipation, diarrhea and vomiting.        States last BM was 2 days ago   Genitourinary:  Negative for difficulty urinating and dysuria.   Musculoskeletal:  Negative for arthralgias.   Skin:  Negative for wound.   Neurological:  Positive for weakness. Negative for light-headedness and headaches.     Objective:     Vital Signs (Most Recent):  Temp: 97.9 °F (36.6 °C) (10/17/23 0712)  Pulse: 62 (10/17/23 0712)  Resp: 18 (10/17/23 0712)  BP: 138/76 (10/17/23 0712)  SpO2: 98 % (10/17/23 0712) Vital Signs (24h Range):  Temp:  [97.9 °F (36.6 °C)-98 °F (36.7 °C)] 97.9 °F (36.6 °C)  Pulse:  [60-77] 62  Resp:  [18] 18  SpO2:  [96 %-98 %] 98 %  BP: (121-138)/(61-76) 138/76     Weight: 79.8 kg (175 lb 14.4 oz)  Body mass index is 24.53 kg/m².    Intake/Output Summary (Last 24 hours) at 10/17/2023 1602  Last data filed at 10/17/2023 1336  Gross per 24 hour   Intake 530 ml   Output --   Net 530 ml         Physical Exam  HENT:      Head: Normocephalic.      Mouth/Throat:      Mouth: Mucous membranes are moist.   Cardiovascular:      Rate and Rhythm: Rhythm irregular.      Pulses: Normal pulses.      Heart sounds: Normal heart sounds. No murmur heard.  Pulmonary:      Effort: Pulmonary effort is normal. No respiratory distress.      Breath sounds: Normal breath sounds. No stridor. No wheezing or rhonchi.   Abdominal:      General: Bowel sounds are normal. There is no distension.      Palpations: Abdomen is soft. There is no mass.      Tenderness: There is no abdominal tenderness. There is no guarding or rebound.      Hernia: No hernia is present.   Musculoskeletal:          General: No swelling, tenderness, deformity or signs of injury. Normal range of motion.   Skin:     General: Skin is warm and dry.      Coloration: Skin is not jaundiced or pale.      Findings: No bruising or erythema.   Neurological:      Mental Status: He is alert and oriented to person, place, and time.   Psychiatric:         Mood and Affect: Mood normal.             Significant Labs: All pertinent labs within the past 24 hours have been reviewed.  Recent Lab Results       None            Significant Imaging: I have reviewed all pertinent imaging results/findings within the past 24 hours.

## 2023-10-17 NOTE — PLAN OF CARE
Problem: Adult Inpatient Plan of Care  Goal: Plan of Care Review  Outcome: Ongoing, Progressing  Goal: Patient-Specific Goal (Individualized)  Outcome: Ongoing, Progressing  Goal: Absence of Hospital-Acquired Illness or Injury  Outcome: Ongoing, Progressing  Goal: Optimal Comfort and Wellbeing  Outcome: Ongoing, Progressing  Goal: Readiness for Transition of Care  Outcome: Ongoing, Progressing  Intervention: Mutually Develop Transition Plan  Flowsheets (Taken 10/17/2023 1600)  Transportation Anticipated: family or friend will provide     Problem: Impaired Wound Healing  Goal: Optimal Wound Healing  Outcome: Ongoing, Progressing  Intervention: Promote Wound Healing  Flowsheets (Taken 10/17/2023 1600)  Oral Nutrition Promotion: safe use of adaptive equipment encouraged  Sleep/Rest Enhancement: awakenings minimized     Problem: Skin Injury Risk Increased  Goal: Skin Health and Integrity  Outcome: Ongoing, Progressing  Intervention: Promote and Optimize Oral Intake  Flowsheets (Taken 10/17/2023 1600)  Oral Nutrition Promotion: safe use of adaptive equipment encouraged     Problem: Fall Injury Risk  Goal: Absence of Fall and Fall-Related Injury  Outcome: Ongoing, Progressing  Intervention: Identify and Manage Contributors  Flowsheets (Taken 10/17/2023 1600)  Self-Care Promotion: independence encouraged  Medication Review/Management: medications reviewed

## 2023-10-17 NOTE — PLAN OF CARE
[facility] - Medical Surgical Unit - Swing Bed   Interdisciplinary Team Meeting    Patient: Joey King   Today's Date: 10/17/2023   Estimated D/C Date: 11/6/2023        Physician: Len Palacios MD Nurse Practitioner: Edwardo Adair NP   Pharmacy: Melyssa Branstetter, PharmD Unit Director: BRANDON Merchant   :  Cinthya Avila RN Physical/Occupational Therapy:  Rae Valencia OT   Speech Therapy: DANN Activity Therapy: Nesha Shane LPN   Nursing: BRANDON Merchant  Respiratory: Mark Anthony Burnett,  Dietary: Nam Randle  Other:      Nursing  New Symptoms/Problems: N/A  Last Bowel Movement: 10/13/23   Urine: continent  Rogers: No   Bowel: continent  Constipated: No  Diarrhea: No   Isolation: No  Cognition: Memory issues  Aspiration Precautions:No  Wound Care: Yes  Wound Location/Tx: Incision site to RLQ of abd/ Skin tear left hand/Clean w/ NS and apply bebo dressing, change weekly  Comment(s): Redness to coccyx' apply bordered foam gauze    Respiratory   O2 Device: Room Air  O2 Flow: N/A  SpO2: 98%  Neb Tx: No  Comment(s):        Dietary  Nutrition: Cardiac  Comment(s):       Speech Therapy  Speech/Swallowing: No current speech or swallowing issues  Comment(s):       Physical Therapy  Gait/Assistive Device: 5 steps @ edge of bed w/CGA ELOS: Plan to DC 11/6/2023    Transfers: Minimal Assistance  Bed Mobility: Contact Guard Assistance Range of Motion/Restrictions: N/A  Comment(s):      Occupational Therapy  Eating/Grooming: Minimal Assistance Toileting: Moderate Assistance   Bathing: Moderate Assistance Dressing (Upper Body): Moderate Assistance   Dressing (Lower Body): Maximum Assistance Comment(s):      Activity Therapy  Level of participation: Active participation  Comment(s):       Pharmacy  Medication Changes (see MD orders in chart): Yes  Labs Reviewed: Yes  New Lab Orders: Yes  Comment(s): labs obtained 2x/wk      Tx Plan/Recommendations reviewed  with family and/or patient on (date) 10/16/2023.  Additional family Conference/Training: N/A  D/C Plan/Recommendations: Home with HH and Home with family  DAVID: 11/6/2023  Comment(s): Patient will discharge home with daughter and home health

## 2023-10-17 NOTE — ASSESSMENT & PLAN NOTE
10/13/23   continue flecanide  monitor on telemetry   eliquis on hold for recent gi bleed- can resume elquis in one week if no further bleeding    10/17/23     resume eliquis 5 mg po BID on 10/20 if no bleeding

## 2023-10-17 NOTE — PLAN OF CARE
[facility] - Medical Surgical Unit  Discharge Assessment    Primary Care Provider: Prashanth Ryan,      Discharge Assessment (most recent)       BRIEF DISCHARGE ASSESSMENT - 10/17/23 4018          Discharge Planning    Assessment Type Discharge Planning Brief Assessment (P)      Resource/Environmental Concerns none (P)      Support Systems Children (P)                    Spoke with Mr. King's daughter, Ashley, she says as of now the plan is to take Mr. King home with her and continue [facility].

## 2023-10-17 NOTE — ASSESSMENT & PLAN NOTE
10/13/23 records from [facility] show recent GI bleed - had EGD that [id number] mm bleeding  Taty Colon tear- had 2 hemastatic clips applied. No further bleeding   H&H stable 9/27   GI recommends holding eliquis for another week- resume then if no further bleeding     10/16/23 h&h 10.2/31.3    10/17/23 denies any bleeding - resume eliquis 10/20 if no further bleeding

## 2023-10-17 NOTE — PT/OT/SLP PROGRESS
Occupational Therapy   Treatment    Name: Joey King  MRN: [id number]  Admitting Diagnosis:  Weakness       Recommendations:     Discharge Recommendations: Moderate Intensity Therapy  Discharge Equipment Recommendations:  walker, rolling  Barriers to discharge:  None    Assessment:     Joey King is a 83 y.o. male with a medical diagnosis of Weakness.  He presents with weakness and decline with ADLs. Performance deficits affecting function are weakness, impaired endurance, impaired functional mobility, gait instability, impaired balance, decreased coordination, decreased lower extremity function, decreased safety awareness, impaired coordination.     Rehab Prognosis:  Fair; patient would benefit from acute skilled OT services to address these deficits and reach maximum level of function.       Plan:     Patient to be seen 5 x/week to address the above listed problems via self-care/home management, therapeutic exercises, therapeutic activities  Plan of Care Expires:    Plan of Care Reviewed with: patient    Subjective     Chief Complaint: Pain and weakness  Patient/Family Comments/goals: to return to prior level of function  Pain/Comfort:       Objective:     Communicated with: Nurse prior to session.  Patient found supine with bed alarm upon OT entry to room.    General Precautions: Standard, fall    Orthopedic Precautions:N/A  Braces: N/A  Respiratory Status: Room air     Occupational Performance:     Bed Mobility:    Patient completed Rolling/Turning to Left with  minimum assistance  Patient completed Rolling/Turning to Right with minimum assistance  Patient completed Scooting/Bridging with minimum assistance  Patient completed Supine to Sit with minimum assistance  Patient completed Sit to Supine with minimum assistance     Functional Mobility/Transfers:  Not tested  Functional Mobility: n/a    Activities of Daily Living:  Not tested      [facility] 6 Click ADL:      Treatment & Education:  Pt  performed B UE strengthening exercises to include:   Shoulder flexion   Chest press    Elbow flexion   Elbow extension   Pectoral stretches   Bilateral rowing  All exercises performed 3x10 reps using 3# dowel and red theraband.      Patient left supine with all lines intact and call button in reach    GOALS:   Multidisciplinary Problems       Occupational Therapy Goals          Problem: Occupational Therapy    Goal Priority Disciplines Outcome Interventions   Occupational Therapy Goal     OT, PT/OT Ongoing, Progressing    Description: Grooming Status:   Short Term Goal: Pt will perform grooming with SBA sitting EOB.   Long Term Goal: Pt will perform grooming/oral hygiene standing at sink with SBA      LE dressing Status:   Short Term Goal: Pt will perform LE dressing with SBA.   Long Term Goal: Pt will perform LE dressing with Mod I.    Toileting Status:   Short Term Goal: Pt will perform toilet hygiene on BSC with Mod I.  Long Term Goal: Pt will perform toilet hygiene on toilet with no AE with Mod I.    Commode Transfer:   Short Term Goal: Pt will perform BSC t/f with Mod I.  Long Term Goal:  Pt will perform toilet t/f in bathroom with Mod I.     Bathing Status:   Long Term Goal: Pt will perform sponge bath with Mod I with no unsafe fatigue.     Strength Status: 5/5 BUEs  Long Term Goal: Pt to perform BUE strengthening with weights and/or body weight to increase ADL independence and safety    Endurance Status:   Short Term Goal:pt to perform 15 min OT treatment with 5 or greater rest breaks  Long Term Goal: pt to perform 30 min OT treat with 3 or less rest breaks                          Time Tracking:     OT Date of Treatment: 10/17/23  OT Start Time: 1505  OT Stop Time: 1525  OT Total Time (min): 20 min    Billable Minutes:Therapeutic Exercise 20 min    KAREN Varela/L, CSRS  OT/ALBA: OT          10/17/2023

## 2023-10-17 NOTE — PT/OT/SLP PROGRESS
Physical Therapy Treatment    Patient Name:  Joey King   MRN:  [id number]    Recommendations:     Discharge Recommendations:    Discharge Equipment Recommendations: walker, rolling  Barriers to discharge: Inaccessible home and Decreased caregiver support    Assessment:     Joey King is a 83 y.o. male admitted with a medical diagnosis of Weakness.  He presents with the following impairments/functional limitations: weakness, impaired endurance, impaired functional mobility, gait instability, impaired balance, decreased coordination, decreased lower extremity function, decreased safety awareness. Pt. With improved tolerance to OOB activity this date.     Rehab Prognosis: Good; patient would benefit from acute skilled PT services to address these deficits and reach maximum level of function.    Recent Surgery: * No surgery found *      Plan:     During this hospitalization, patient to be seen 5 x/week to address the identified rehab impairments via therapeutic activities, gait training, therapeutic exercises, neuromuscular re-education and progress toward the following goals:    Plan of Care Expires:  11/05/23    Subjective     Chief Complaint: weakness  Patient/Family Comments/goals: Pt. Agrees to PT tx. And denies any pain at time of visit.   Pain/Comfort:  Pain Rating 1: 0/10      Objective:     Communicated with Flavio Aguilar PT for POC and nursing staff prior to session.  Patient found HOB elevated with bed alarm upon PT entry to room.     General Precautions: Standard, fall  Orthopedic Precautions: N/A  Braces: N/A  Respiratory Status: Room air     Functional Mobility:  Bed Mobility:     Rolling Left:  contact guard assistance  Scooting: minimum assistance  Bridging: contact guard assistance  Supine to Sit: minimum assistance and moderate assistance  Sit to Supine: minimum assistance  Transfers:     Sit to Stand:  minimum assistance and moderate assistance with rolling walker  Gait: Pt. Took  5 steps lateral to foot of bed and 5 steps lateral to head of bed to CGA @ gait belt and VC for sequencing.       AM-PAC 6 CLICK MOBILITY  Turning over in bed (including adjusting bedclothes, sheets and blankets)?: 3  Sitting down on and standing up from a chair with arms (e.g., wheelchair, bedside commode, etc.): 3  Moving from lying on back to sitting on the side of the bed?: 3  Moving to and from a bed to a chair (including a wheelchair)?: 3  Need to walk in hospital room?: 2  Climbing 3-5 steps with a railing?: 1  Basic Mobility Total Score: 15       Treatment & Education:  Pt. Participated in mobility per above and performed LE PREs in Supine: AP, QS, GS, Hip ER. IR, Heel slides with MRE and Hip ABD / ADD with assist 2 x 10 reps each. Pt. Performed Wt. Shifts @ EOB and arm raises (trunk ext/ flex.) prior to stand.     Patient left HOB elevated with all lines intact, call button in reach, and family present..    GOALS:   Multidisciplinary Problems       Physical Therapy Goals          Problem: Physical Therapy    Goal Priority Disciplines Outcome Goal Variances Interventions   Physical Therapy Goal     PT, PT/OT Ongoing, Progressing     Description: Goals to be met by: 11/5/2023     Patient will increase functional independence with mobility by performing:    STG: Supine to sit with Stand-by Assistance  STG: Sit to supine with Stand-by Assistance  LTG: Sit to stand transfer with Modified [place]  LTG: Bed to chair transfer with Modified [place] using Rolling Walker  LTG: Gait  x 200 feet with Modified [place] using Rolling Walker.   LTG: Stand for 10 minutes with Modified [place] using Rolling Walker                         Time Tracking:     PT Received On: 10/17/23  PT Start Time: 1353     PT Stop Time: 1428  PT Total Time (min): 35 min     Billable Minutes: Therapeutic Activity 20 and Therapeutic Exercise 15    Treatment Type: Treatment  PT/PTA: PTA     Number of PTA visits since last PT  visit: 1     10/17/2023

## 2023-10-17 NOTE — PROGRESS NOTES
[facility] - Medical Surgical Unit  Hospital Medicine  Progress Note    Patient Name: Joey King  MRN: [id number]  Patient Class: IP- Swing   Admission Date: 10/13/2023  Length of Stay: 4 days  Attending Physician: Len Palacios IV, DO  Primary Care Provider: Prashanth Ryan DO        Subjective:     Principal Problem:Weakness        HPI:  Mr. Joey King is an 83 year old male with pmh of htn, afib, CHF,anxiety,depression,PE in Feb.2023, EGRD, hypothyroidism,cholecystectomy 09/23/23.He is s/p an observation stay at [facility]. EGD shows Taty Colon Tear . Had 2 hemostatic clips applied. He had no further bleeding. Records show H&H is 9/27. He has been accepted back to swing bed at [facility].     Mr. King was admitted to [facility] on 10/13/2023 for continued therapy.           Overview/Hospital Course:  Mr. King is 83 year old white male who was admitted [facility] for swing bed services for therapy.  He was received from [facility] in [place], following an admission for UGIB secondary to Taty-Colon tear.  He was seen by GI at [facility] who performed EGD to confirm this diagnosis.  GI placed hemostatic clips for the bleed.  Pt is on long term use of Eliquis due to history of Afib and PE.  GI recommended holding his Eliquis for an additional week.  At that time, if there are no signs of GIB, it can be restarted.  He has no complaints voiced tonight.     0620: I was called by nursing staff after the patient had fallen.  He reports he was going to the bathroom, but was too weak.  He denies hitting his head and denies LOC.  Nursing staff reports they found him on the floor at the foot of the bed.  Pt is alert and oriented x3.  He denies pain.  FROM without pain to all 4 extremities.  Denies neck/back pain.  No obvious signs of soft tissue injury.      10/17/23 Awake and resting in bed without complaints. Daughter at bedside. Denies any bleeding. Continue with PT  and OT .       Interval History: weakness    Review of Systems   Constitutional:  Positive for appetite change. Negative for fatigue and unexpected weight change.        Reports poor appetite   Respiratory:  Negative for cough, chest tightness and shortness of breath.    Cardiovascular:  Negative for chest pain and palpitations.   Gastrointestinal:  Negative for abdominal pain, constipation, diarrhea and vomiting.        States last BM was 2 days ago   Genitourinary:  Negative for difficulty urinating and dysuria.   Musculoskeletal:  Negative for arthralgias.   Skin:  Negative for wound.   Neurological:  Positive for weakness. Negative for light-headedness and headaches.     Objective:     Vital Signs (Most Recent):  Temp: 97.9 °F (36.6 °C) (10/17/23 0712)  Pulse: 62 (10/17/23 0712)  Resp: 18 (10/17/23 0712)  BP: 138/76 (10/17/23 0712)  SpO2: 98 % (10/17/23 0712) Vital Signs (24h Range):  Temp:  [97.9 °F (36.6 °C)-98 °F (36.7 °C)] 97.9 °F (36.6 °C)  Pulse:  [60-77] 62  Resp:  [18] 18  SpO2:  [96 %-98 %] 98 %  BP: (121-138)/(61-76) 138/76     Weight: 79.8 kg (175 lb 14.4 oz)  Body mass index is 24.53 kg/m².    Intake/Output Summary (Last 24 hours) at 10/17/2023 1602  Last data filed at 10/17/2023 1336  Gross per 24 hour   Intake 530 ml   Output --   Net 530 ml         Physical Exam  HENT:      Head: Normocephalic.      Mouth/Throat:      Mouth: Mucous membranes are moist.   Cardiovascular:      Rate and Rhythm: Rhythm irregular.      Pulses: Normal pulses.      Heart sounds: Normal heart sounds. No murmur heard.  Pulmonary:      Effort: Pulmonary effort is normal. No respiratory distress.      Breath sounds: Normal breath sounds. No stridor. No wheezing or rhonchi.   Abdominal:      General: Bowel sounds are normal. There is no distension.      Palpations: Abdomen is soft. There is no mass.      Tenderness: There is no abdominal tenderness. There is no guarding or rebound.      Hernia: No hernia is present.    Musculoskeletal:         General: No swelling, tenderness, deformity or signs of injury. Normal range of motion.   Skin:     General: Skin is warm and dry.      Coloration: Skin is not jaundiced or pale.      Findings: No bruising or erythema.   Neurological:      Mental Status: He is alert and oriented to person, place, and time.   Psychiatric:         Mood and Affect: Mood normal.             Significant Labs: All pertinent labs within the past 24 hours have been reviewed.  Recent Lab Results       None            Significant Imaging: I have reviewed all pertinent imaging results/findings within the past 24 hours.      Assessment/Plan:      * Weakness  10/13/23 PT and OT to evaluate and treat      10/16/23 PT and OT to evaluate and treat    10/17/23 continue PT and OT    H/O: GI bleed    10/13/23 records from [facility] show recent GI bleed - had EGD that [id number] mm bleeding  Taty Colon tear- had 2 hemastatic clips applied. No further bleeding   H&H stable 9/27   GI recommends holding eliquis for another week- resume then if no further bleeding     10/16/23 h&h 10.2/31.3    10/17/23 denies any bleeding - resume eliquis 10/20 if no further bleeding     Chronic diastolic CHF (congestive heart failure)  10/13/23   Daily weights  Intake and Output      10/16/23 stable    Paroxysmal atrial fibrillation  10/13/23   continue flecanide  monitor on telemetry   eliquis on hold for recent gi bleed- can resume elquis in one week if no further bleeding    10/17/23     resume eliquis 5 mg po BID on 10/20 if no bleeding     Gastroesophageal reflux disease without esophagitis  10/13/23 continue protonix 40 mg podaily      Essential hypertension  10/13/23 continue clonidone 0.1 mg po daily      10/16/23 stable 140/70    Hypothyroidism    10/13/23 continue levothyroxine   100 mcg po daily    Depression  10/13/23 continue celexa 40 mg po daily  Continue abilify 5 mg po daily          VTE Risk Mitigation (From admission,  onward)           Ordered     IP VTE LOW RISK PATIENT  Once         10/13/23 2008     Place ULISES colese  Until discontinued         10/13/23 2008                    Discharge Planning   DAVID: 11/6/2023     Code Status: Full Code   Is the patient medically ready for discharge?:     Reason for patient still in hospital (select all that apply): Treatment  Discharge Plan A: Home, Home Health                  Len Palacios IV, DO  Department of Hospital Medicine   [facility] - [facility] Surgical [facility]

## 2023-10-18 LAB
ANION GAP SERPL CALCULATED.3IONS-SCNC: 12 MMOL/L (ref 7–16)
BASOPHILS # BLD AUTO: 0.02 K/UL (ref 0–0.2)
BASOPHILS NFR BLD AUTO: 0.3 % (ref 0–1)
BUN SERPL-MCNC: 10 MG/DL (ref 7–18)
BUN/CREAT SERPL: 10 (ref 6–20)
CALCIUM SERPL-MCNC: 9.1 MG/DL (ref 8.5–10.1)
CHLORIDE SERPL-SCNC: 98 MMOL/L (ref 98–107)
CO2 SERPL-SCNC: 27 MMOL/L (ref 21–32)
CREAT SERPL-MCNC: 0.99 MG/DL (ref 0.7–1.3)
DIFFERENTIAL METHOD BLD: ABNORMAL
EGFR (NO RACE VARIABLE) (RUSH/TITUS): 75 ML/MIN/1.73M2
EOSINOPHIL # BLD AUTO: 0.03 K/UL (ref 0–0.5)
EOSINOPHIL NFR BLD AUTO: 0.4 % (ref 1–4)
ERYTHROCYTE [DISTWIDTH] IN BLOOD BY AUTOMATED COUNT: 14.8 % (ref 11.5–14.5)
GLUCOSE SERPL-MCNC: 125 MG/DL (ref 74–106)
HCT VFR BLD AUTO: 37.2 % (ref 40–54)
HGB BLD-MCNC: 12.3 G/DL (ref 13.5–18)
LYMPHOCYTES # BLD AUTO: 1 K/UL (ref 1–4.8)
LYMPHOCYTES NFR BLD AUTO: 12.6 % (ref 27–41)
MCH RBC QN AUTO: 30.8 PG (ref 27–31)
MCHC RBC AUTO-ENTMCNC: 33.1 G/DL (ref 32–36)
MCV RBC AUTO: 93 FL (ref 80–96)
MONOCYTES # BLD AUTO: 0.79 K/UL (ref 0–0.8)
MONOCYTES NFR BLD AUTO: 9.9 % (ref 2–6)
MPC BLD CALC-MCNC: 8.3 FL (ref 9.4–12.4)
NEUTROPHILS # BLD AUTO: 6.12 K/UL (ref 1.8–7.7)
NEUTROPHILS NFR BLD AUTO: 76.8 % (ref 53–65)
OCCULT BLOOD, OTHER: POSITIVE
PH, OTHER: 3
PLATELET # BLD AUTO: 284 K/UL (ref 150–400)
POTASSIUM SERPL-SCNC: 3.5 MMOL/L (ref 3.5–5.1)
RBC # BLD AUTO: 4 M/UL (ref 4.6–6.2)
SODIUM SERPL-SCNC: 133 MMOL/L (ref 136–145)
TROPONIN I SERPL DL<=0.01 NG/ML-MCNC: 12.9 PG/ML
TROPONIN I SERPL DL<=0.01 NG/ML-MCNC: 14.7 PG/ML
WBC # BLD AUTO: 7.96 K/UL (ref 4.5–11)

## 2023-10-18 PROCEDURE — 99308 SBSQ NF CARE LOW MDM 20: CPT | Mod: ,,, | Performed by: NURSE PRACTITIONER

## 2023-10-18 PROCEDURE — 93005 ELECTROCARDIOGRAM TRACING: CPT

## 2023-10-18 PROCEDURE — 99900035 HC TECH TIME PER 15 MIN (STAT)

## 2023-10-18 PROCEDURE — 82271 OCCULT BLOOD OTHER SOURCES: CPT | Performed by: NURSE PRACTITIONER

## 2023-10-18 PROCEDURE — 85025 COMPLETE CBC W/AUTO DIFF WBC: CPT | Performed by: NURSE PRACTITIONER

## 2023-10-18 PROCEDURE — 25000003 PHARM REV CODE 250: Performed by: NURSE PRACTITIONER

## 2023-10-18 PROCEDURE — 93010 EKG 12-LEAD: ICD-10-PCS | Mod: ,,, | Performed by: HOSPITALIST

## 2023-10-18 PROCEDURE — 99308 PR NURSING FAC CARE, SUBSEQ, MINOR COMPLIC: ICD-10-PCS | Mod: ,,, | Performed by: NURSE PRACTITIONER

## 2023-10-18 PROCEDURE — 11000004 HC SNF PRIVATE

## 2023-10-18 PROCEDURE — 84484 ASSAY OF TROPONIN QUANT: CPT | Performed by: NURSE PRACTITIONER

## 2023-10-18 PROCEDURE — 63600175 PHARM REV CODE 636 W HCPCS: Performed by: NURSE PRACTITIONER

## 2023-10-18 PROCEDURE — 97110 THERAPEUTIC EXERCISES: CPT

## 2023-10-18 PROCEDURE — C9113 INJ PANTOPRAZOLE SODIUM, VIA: HCPCS | Performed by: NURSE PRACTITIONER

## 2023-10-18 PROCEDURE — 94761 N-INVAS EAR/PLS OXIMETRY MLT: CPT

## 2023-10-18 PROCEDURE — 97116 GAIT TRAINING THERAPY: CPT

## 2023-10-18 PROCEDURE — 80048 BASIC METABOLIC PNL TOTAL CA: CPT | Performed by: NURSE PRACTITIONER

## 2023-10-18 PROCEDURE — 93010 ELECTROCARDIOGRAM REPORT: CPT | Mod: ,,, | Performed by: HOSPITALIST

## 2023-10-18 RX ORDER — ONDANSETRON 2 MG/ML
4 INJECTION INTRAMUSCULAR; INTRAVENOUS EVERY 8 HOURS PRN
Status: DISCONTINUED | OUTPATIENT
Start: 2023-10-18 | End: 2023-10-18

## 2023-10-18 RX ORDER — LORAZEPAM 2 MG/ML
0.5 INJECTION INTRAMUSCULAR EVERY 4 HOURS PRN
Status: DISCONTINUED | OUTPATIENT
Start: 2023-10-18 | End: 2023-10-20

## 2023-10-18 RX ORDER — HYDRALAZINE HYDROCHLORIDE 20 MG/ML
5 INJECTION INTRAMUSCULAR; INTRAVENOUS EVERY 6 HOURS PRN
Status: DISCONTINUED | OUTPATIENT
Start: 2023-10-18 | End: 2023-10-18

## 2023-10-18 RX ORDER — ONDANSETRON 2 MG/ML
4 INJECTION INTRAMUSCULAR; INTRAVENOUS EVERY 6 HOURS PRN
Status: DISCONTINUED | OUTPATIENT
Start: 2023-10-18 | End: 2023-10-18

## 2023-10-18 RX ORDER — HYDRALAZINE HYDROCHLORIDE 20 MG/ML
5 INJECTION INTRAMUSCULAR; INTRAVENOUS EVERY 6 HOURS PRN
Status: DISCONTINUED | OUTPATIENT
Start: 2023-10-18 | End: 2023-10-29 | Stop reason: HOSPADM

## 2023-10-18 RX ORDER — ONDANSETRON 4 MG/1
4 TABLET, ORALLY DISINTEGRATING ORAL EVERY 8 HOURS PRN
Status: DISCONTINUED | OUTPATIENT
Start: 2023-10-18 | End: 2023-10-18

## 2023-10-18 RX ORDER — PANTOPRAZOLE SODIUM 40 MG/10ML
40 INJECTION, POWDER, LYOPHILIZED, FOR SOLUTION INTRAVENOUS
Status: COMPLETED | OUTPATIENT
Start: 2023-10-18 | End: 2023-10-18

## 2023-10-18 RX ORDER — HYDROCODONE BITARTRATE AND ACETAMINOPHEN 5; 325 MG/1; MG/1
1 TABLET ORAL EVERY 6 HOURS PRN
Status: DISCONTINUED | OUTPATIENT
Start: 2023-10-18 | End: 2023-10-18

## 2023-10-18 RX ORDER — ONDANSETRON 4 MG/1
4 TABLET, ORALLY DISINTEGRATING ORAL EVERY 8 HOURS PRN
Status: DISCONTINUED | OUTPATIENT
Start: 2023-10-18 | End: 2023-10-29 | Stop reason: HOSPADM

## 2023-10-18 RX ORDER — SUCRALFATE 1 G/1
1 TABLET ORAL
Status: DISCONTINUED | OUTPATIENT
Start: 2023-10-18 | End: 2023-10-29 | Stop reason: HOSPADM

## 2023-10-18 RX ORDER — ONDANSETRON 2 MG/ML
8 INJECTION INTRAMUSCULAR; INTRAVENOUS EVERY 6 HOURS PRN
Status: DISCONTINUED | OUTPATIENT
Start: 2023-10-18 | End: 2023-10-18

## 2023-10-18 RX ORDER — SODIUM CHLORIDE 9 MG/ML
INJECTION, SOLUTION INTRAVENOUS CONTINUOUS
Status: DISCONTINUED | OUTPATIENT
Start: 2023-10-18 | End: 2023-10-19

## 2023-10-18 RX ADMIN — ONDANSETRON 4 MG: 4 TABLET, ORALLY DISINTEGRATING ORAL at 01:10

## 2023-10-18 RX ADMIN — ASPIRIN 81 MG: 81 TABLET, COATED ORAL at 09:10

## 2023-10-18 RX ADMIN — PROMETHAZINE HYDROCHLORIDE 12.5 MG: 25 INJECTION INTRAMUSCULAR; INTRAVENOUS at 12:10

## 2023-10-18 RX ADMIN — CALCIUM CARBONATE 500 MG: 500 TABLET, CHEWABLE ORAL at 10:10

## 2023-10-18 RX ADMIN — CITALOPRAM HYDROBROMIDE 40 MG: 20 TABLET ORAL at 09:10

## 2023-10-18 RX ADMIN — HYDROCODONE BITARTRATE AND ACETAMINOPHEN 1 TABLET: 5; 325 TABLET ORAL at 11:10

## 2023-10-18 RX ADMIN — FLECAINIDE ACETATE 150 MG: 50 TABLET ORAL at 09:10

## 2023-10-18 RX ADMIN — PANTOPRAZOLE SODIUM 40 MG: 40 TABLET, DELAYED RELEASE ORAL at 09:10

## 2023-10-18 RX ADMIN — ONDANSETRON 4 MG: 2 INJECTION INTRAMUSCULAR; INTRAVENOUS at 07:10

## 2023-10-18 RX ADMIN — POLYETHYLENE GLYCOL 3350 17 G: 17 POWDER, FOR SOLUTION ORAL at 09:10

## 2023-10-18 RX ADMIN — PROMETHAZINE HYDROCHLORIDE 12.5 MG: 25 INJECTION INTRAMUSCULAR; INTRAVENOUS at 02:10

## 2023-10-18 RX ADMIN — SIMETHICONE 80 MG: 80 TABLET, CHEWABLE ORAL at 09:10

## 2023-10-18 RX ADMIN — LORAZEPAM 0.5 MG: 2 INJECTION INTRAMUSCULAR; INTRAVENOUS at 03:10

## 2023-10-18 RX ADMIN — PANTOPRAZOLE SODIUM 40 MG: 40 INJECTION, POWDER, LYOPHILIZED, FOR SOLUTION INTRAVENOUS at 12:10

## 2023-10-18 RX ADMIN — ARIPIPRAZOLE 5 MG: 5 TABLET ORAL at 09:10

## 2023-10-18 RX ADMIN — OXYBUTYNIN CHLORIDE 15 MG: 5 TABLET, EXTENDED RELEASE ORAL at 09:10

## 2023-10-18 RX ADMIN — SENNOSIDES AND DOCUSATE SODIUM 1 TABLET: 50; 8.6 TABLET ORAL at 09:10

## 2023-10-18 RX ADMIN — SUCRALFATE 1 G: 1 TABLET ORAL at 05:10

## 2023-10-18 RX ADMIN — CLONIDINE HYDROCHLORIDE 0.1 MG: 0.1 TABLET ORAL at 05:10

## 2023-10-18 RX ADMIN — SODIUM CHLORIDE: 9 INJECTION, SOLUTION INTRAVENOUS at 01:10

## 2023-10-18 RX ADMIN — HYDRALAZINE HYDROCHLORIDE 5 MG: 20 INJECTION INTRAMUSCULAR; INTRAVENOUS at 04:10

## 2023-10-18 RX ADMIN — SUCRALFATE 1 G: 1 TABLET ORAL at 09:10

## 2023-10-18 RX ADMIN — MUPIROCIN: 20 OINTMENT TOPICAL at 09:10

## 2023-10-18 NOTE — PLAN OF CARE
Problem: Skin Injury Risk Increased  Goal: Skin Health and Integrity  Outcome: Ongoing, Progressing  Intervention: Optimize Skin Protection  Flowsheets (Taken 10/18/2023 1750)  Pressure Reduction Techniques:   frequent weight shift encouraged   weight shift assistance provided   heels elevated off bed  Pressure Reduction Devices: pressure-redistributing mattress utilized  Skin Protection: silicone foam dressing in place  Head of Bed (HOB) Positioning: HOB elevated     Problem: Fall Injury Risk  Goal: Absence of Fall and Fall-Related Injury  Outcome: Ongoing, Progressing  Intervention: Identify and Manage Contributors  Flowsheets (Taken 10/18/2023 1750)  Self-Care Promotion:   independence encouraged   BADL personal objects within reach   BADL personal routines maintained   meal set-up provided   safe use of adaptive equipment encouraged

## 2023-10-18 NOTE — CONSULTS
"  [facility] - Medical Surgical Unit  Adult Nutrition  Consult Note    SUMMARY     Recommendations    Recommendation/Intervention: 1. Continue supplement and F/U and treat as indicated  Goals: Meet EEN >75%, stabilize wt  Nutrition Goal Status: new    Assessment and Plan    No new Assessment & Plan notes have been filed under this hospital service since the last note was generated.  Service: Nutrition       Malnutrition Assessment  Malnutrition Context: chronic illness, acute illness or injury  Malnutrition Level: moderate  Skin (Micronutrient): red       Weight Loss (Malnutrition): greater than 10% in 6 months (17%)  Energy Intake (Malnutrition): less than or equal to 50% for greater than or equal to 1 month                         Reason for Assessment    Reason For Assessment: consult, radha Wharton (swing bed pt)  Diagnosis:  (Recent Chlocystectomy and UGIB with Taty Colon tear)  Relevant Medical History: UGIB, GERD, Afib, Hypothyroidisim, CHF, Anxiety, Depression, Cholecystectomy (recent)  Interdisciplinary Rounds: did not attend  General Information Comments: Wt: ~200# 5/23  Pt's wt has been trending down over the past year. ~30# change in ~6 months.    Nutrition Risk Screen    Nutrition Risk Screen: unintentional loss of 10 lbs or more in the past 2 months, reduced oral intake over the last month    Nutrition/Diet History    Patient Reported Diet/Restrictions/Preferences: general  Spiritual, Cultural Beliefs, Sabianism Practices, Values that Affect Care: no  Food Allergies: NKFA  Factors Affecting Nutritional Intake: nausea/vomiting, decreased appetite    Anthropometrics    Temp: 98.4 °F (36.9 °C)  Height Method: Stated  Height: 5' 11" (180.3 cm)  Height (inches): 71 in  Weight Method: Bed Scale  Weight: 75.3 kg (166 lb)  Weight (lb): 166 lb  Ideal Body Weight (IBW), Male: 172 lb  % Ideal Body Weight, Male (lb): 96.51 %  BMI (Calculated): 23.2  BMI Grade: 18.5-24.9 - normal  Usual Body Weight " (UBW), k kg  % Usual Body Weight: 82.92  % Weight Change From Usual Weight: -17.26 %       Lab/Procedures/Meds    Pertinent Labs Reviewed: reviewed  Pertinent Labs Comments: Alb 2.5, Na 133-imp,  Pertinent Medications Reviewed: reviewed  Pertinent Medications Comments: Protonix, B6, Vit D, Vit E, ZnSO4    Physical Findings/Assessment         Estimated/Assessed Needs    Weight Used For Calorie Calculations: 75.3 kg (166 lb)  Energy Calorie Requirements (kcal): 1655-4547  Energy Need Method: Kcal/kg  Protein Requirements: 76-83  Weight Used For Protein Calculations: 75.3 kg (166 lb)     Estimated Fluid Requirement Method:  (9563-4159)  RDA Method (mL):          Nutrition Prescription Ordered    Current Diet Order: Cardiac    Evaluation of Received Nutrient/Fluid Intake    Energy Calories Required: not meeting needs  Protein Required: not meeting needs  Fluid Required: not meeting needs  Comments: Recent N,V  Tolerance: not tolerating  % Intake of Estimated Energy Needs: 25 - 50 %  % Meal Intake: 25 - 50 %    Nutrition Risk    Level of Risk/Frequency of Follow-up: high       Monitor and Evaluation    Food and Nutrient Intake: food and beverage intake  Food and Nutrient Adminstration: diet order  Anthropometric Measurements: weight  Biochemical Data, Medical Tests and Procedures: electrolyte and renal panel  Nutrition-Focused Physical Findings: overall appearance, skin       Nutrition Follow-Up    RD Follow-up?: Yes

## 2023-10-18 NOTE — SUBJECTIVE & OBJECTIVE
Interval History: weakness, nausea    Review of Systems   Constitutional:  Positive for appetite change. Negative for fatigue and unexpected weight change.        Reports poor appetite   Respiratory:  Negative for cough, chest tightness and shortness of breath.    Cardiovascular:  Negative for chest pain and palpitations.   Gastrointestinal:  Positive for nausea and vomiting. Negative for abdominal pain, constipation and diarrhea.        States last BM was 2 days ago   Genitourinary:  Negative for difficulty urinating and dysuria.   Musculoskeletal:  Negative for arthralgias.   Skin:  Negative for wound.   Neurological:  Positive for weakness. Negative for light-headedness and headaches.     Objective:     Vital Signs (Most Recent):  Temp: 98.4 °F (36.9 °C) (10/18/23 0349)  Pulse: 104 (10/18/23 0502)  Resp: 20 (10/18/23 0502)  BP: (!) 170/102 (10/18/23 0628)  SpO2: 95 % (10/18/23 0502) Vital Signs (24h Range):  Temp:  [98.2 °F (36.8 °C)-98.4 °F (36.9 °C)] 98.4 °F (36.9 °C)  Pulse:  [] 104  Resp:  [18-20] 20  SpO2:  [95 %-97 %] 95 %  BP: (133-189)/() 170/102     Weight: 75.7 kg (166 lb 12.8 oz)  Body mass index is 23.26 kg/m².    Intake/Output Summary (Last 24 hours) at 10/18/2023 0854  Last data filed at 10/17/2023 2024  Gross per 24 hour   Intake 480 ml   Output --   Net 480 ml         Physical Exam  HENT:      Head: Normocephalic.      Mouth/Throat:      Mouth: Mucous membranes are moist.   Cardiovascular:      Rate and Rhythm: Rhythm irregular.      Pulses: Normal pulses.      Heart sounds: Normal heart sounds. No murmur heard.  Pulmonary:      Effort: Pulmonary effort is normal. No respiratory distress.      Breath sounds: Normal breath sounds. No stridor. No wheezing or rhonchi.   Abdominal:      General: Bowel sounds are normal. There is no distension.      Palpations: Abdomen is soft. There is no mass.      Tenderness: There is no abdominal tenderness. There is no guarding or rebound.      Hernia:  No hernia is present.      Comments: Nausea    Musculoskeletal:         General: No swelling, tenderness, deformity or signs of injury. Normal range of motion.   Skin:     General: Skin is warm and dry.      Coloration: Skin is not jaundiced or pale.      Findings: No bruising or erythema.   Neurological:      Mental Status: He is alert and oriented to person, place, and time.   Psychiatric:         Mood and Affect: Mood normal.             Significant Labs: All pertinent labs within the past 24 hours have been reviewed.  Recent Lab Results         10/17/23  2204        CPK 38       Troponin I High Sensitivity 9.6               Significant Imaging: I have reviewed all pertinent imaging results/findings within the past 24 hours.

## 2023-10-18 NOTE — PT/OT/SLP PROGRESS
Physical Therapy Treatment    Patient Name:  Joey King   MRN:  [id number]    Recommendations:     Discharge Recommendations:    Discharge Equipment Recommendations: none  Barriers to discharge: None    Assessment:     Joey King is a 83 y.o. male admitted with a medical diagnosis of Weakness.  He presents with the following impairments/functional limitations: weakness, impaired endurance, impaired functional mobility, impaired self care skills, gait instability, impaired balance, decreased lower extremity function, impaired cardiopulmonary response to activity Patient was nauseated and vomiting most the night last night and refused Tx this am.  Reluctantly agreed to tx this afternoon.  Required sitting at EOB for approx 5 minutes after transferring supine to sit due to dizziness and rest breaks between exercises due to generalized fatigue.     Rehab Prognosis: Fair; patient would benefit from acute skilled PT services to address these deficits and reach maximum level of function.    Recent Surgery: * No surgery found *      Plan:     During this hospitalization, patient to be seen 5 x/week to address the identified rehab impairments via gait training, therapeutic activities, therapeutic exercises, neuromuscular re-education and progress toward the following goals:    Plan of Care Expires:  11/05/23    Subjective     Chief Complaint: weakness and N/V last night and earlier today  Patient/Family Comments/goals: to get stronger and return home   Pain/Comfort:  Pain Rating 1: 0/10      Objective:     Communicated with patient and daughter  prior to session.  Patient found with bed in chair position with bed alarm, peripheral IV, telemetry upon PT entry to room.     General Precautions: Standard, fall  Orthopedic Precautions: N/A  Braces: N/A  Respiratory Status: Room air     Functional Mobility:  Bed Mobility:     Rolling Left:  minimum assistance  Rolling Right: minimum assistance  Supine to Sit:  moderate assistance  Sit to Supine: moderate assistance  Transfers:     Sit to Stand:  minimum assistance with rolling walker  Gait: 5 steps laterally along bed using RW with min assist      AM-PAC 6 CLICK MOBILITY  Turning over in bed (including adjusting bedclothes, sheets and blankets)?: 3  Sitting down on and standing up from a chair with arms (e.g., wheelchair, bedside commode, etc.): 3  Moving from lying on back to sitting on the side of the bed?: 3  Moving to and from a bed to a chair (including a wheelchair)?: 2  Need to walk in hospital room?: 2  Climbing 3-5 steps with a railing?: 1  Basic Mobility Total Score: 14       Treatment & Education:  S ee above gait training laterally for 5 steps left and right along bed using RW with min assist x 2 attempts with rest between  Sitting ex of AP x 20 reps  LAQ x 20 reps BL    Patient left with bed in chair position with all lines intact, call button in reach, bed alarm on, and daughter  present..    GOALS:   Multidisciplinary Problems       Physical Therapy Goals          Problem: Physical Therapy    Goal Priority Disciplines Outcome Goal Variances Interventions   Physical Therapy Goal     PT, PT/OT Ongoing, Progressing     Description: Goals to be met by: 11/5/2023     Patient will increase functional independence with mobility by performing:    STG: Supine to sit with Stand-by Assistance  STG: Sit to supine with Stand-by Assistance  LTG: Sit to stand transfer with Modified Strang  LTG: Bed to chair transfer with Modified Strang using Rolling Walker  LTG: Gait  x 200 feet with Modified Strang using Rolling Walker.   LTG: Stand for 10 minutes with Modified Strang using Rolling Walker                         Time Tracking:     PT Received On: 10/18/23  PT Start Time: 0200     PT Stop Time: 0230  PT Total Time (min): 30 min     Billable Minutes: Gait training 15 minutes      Therapeutic training 15 minutes     Treatment Type:  Treatment  PT/PTA: PT     Number of PTA visits since last PT visit: 0     10/18/2023

## 2023-10-18 NOTE — PROGRESS NOTES
[facility]  Hospital Medicine  Progress Note    Patient Name: Joey King  MRN: [id number]  Patient Class: IP- Swing   Admission Date: 10/13/2023  Length of Stay: 5 days  Attending Physician: Len Palacios IV, DO  Primary Care Provider: Prashanth Ryan DO        Subjective:     Principal Problem:Weakness    [facility]          HPI:  Mr. Joey King is an 83 year old male with pmh of htn, afib, CHF,anxiety,depression,PE in Feb.2023, EGRD, hypothyroidism,cholecystectomy 09/23/23.He is s/p an observation stay at [facility]. EGD shows Taty Colon Tear . Had 2 hemostatic clips applied. He had no further bleeding. Records show H&H is 9/27. He has been accepted back to swing bed at [facility].     Mr. King was admitted to [facility] on 10/13/2023 for continued therapy.           Overview/Hospital Course:  Mr. King is 83 year old white male who was admitted [facility] for swing bed services for therapy.  He was received from [facility] in [place], following an admission for UGIB secondary to Taty-Colon tear.  He was seen by GI at [facility] who performed EGD to confirm this diagnosis.  GI placed hemostatic clips for the bleed.  Pt is on long term use of Eliquis due to history of Afib and PE.  GI recommended holding his Eliquis for an additional week.  At that time, if there are no signs of GIB, it can be restarted.  He has no complaints voiced tonight.     0620: I was called by nursing staff after the patient had fallen.  He reports he was going to the bathroom, but was too weak.  He denies hitting his head and denies LOC.  Nursing staff reports they found him on the floor at the foot of the bed.  Pt is alert and oriented x3.  He denies pain.  FROM without pain to all 4 extremities.  Denies neck/back pain.  No obvious signs of soft tissue injury.      10/17/23 Awake and resting in bed without complaints.  Daughter at bedside. Denies any bleeding. Continue with PT and OT .     10/18/23 (00:20)  Nursing staff report one episode of vomiting--patient c/o nausea earlier tonight and was given Zofran ODT. One episode of bilious emesis reported. Will start saline lock and administer protonix and promethazine.     10/18/23 (0450) Elevated BP Reported. Continues to feel nauseated at times. Denies headache, chest pain, diaphoresis, or dizziness. Advised to administer Hydralazine 5mg IVP and monitor BP--will titrate med as needed.     10/18/23 Awake and resting in bed. Reports he started vomiting around midnight. Denies having any dark coffee ground emesis or any signs of blood. Denies abdominal pain. Denies chest pain. Does have some belching. Complains of continued nausea. Will give zofran IV  and monitor. States he did not sleep any last night.    0854 no further nausea. States he is going to try and take a nap.      Interval History: weakness, nausea    Review of Systems   Constitutional:  Positive for appetite change. Negative for fatigue and unexpected weight change.        Reports poor appetite   Respiratory:  Negative for cough, chest tightness and shortness of breath.    Cardiovascular:  Negative for chest pain and palpitations.   Gastrointestinal:  Positive for nausea and vomiting. Negative for abdominal pain, constipation and diarrhea.        States last BM was 2 days ago   Genitourinary:  Negative for difficulty urinating and dysuria.   Musculoskeletal:  Negative for arthralgias.   Skin:  Negative for wound.   Neurological:  Positive for weakness. Negative for light-headedness and headaches.     Objective:     Vital Signs (Most Recent):  Temp: 98.4 °F (36.9 °C) (10/18/23 0349)  Pulse: 104 (10/18/23 0502)  Resp: 20 (10/18/23 0502)  BP: (!) 170/102 (10/18/23 0628)  SpO2: 95 % (10/18/23 0502) Vital Signs (24h Range):  Temp:  [98.2 °F (36.8 °C)-98.4 °F (36.9 °C)] 98.4 °F (36.9 °C)  Pulse:  [] 104  Resp:  [18-20]  20  SpO2:  [95 %-97 %] 95 %  BP: (133-189)/() 170/102     Weight: 75.7 kg (166 lb 12.8 oz)  Body mass index is 23.26 kg/m².    Intake/Output Summary (Last 24 hours) at 10/18/2023 0854  Last data filed at 10/17/2023 2024  Gross per 24 hour   Intake 480 ml   Output --   Net 480 ml         Physical Exam  HENT:      Head: Normocephalic.      Mouth/Throat:      Mouth: Mucous membranes are moist.   Cardiovascular:      Rate and Rhythm: Rhythm irregular.      Pulses: Normal pulses.      Heart sounds: Normal heart sounds. No murmur heard.  Pulmonary:      Effort: Pulmonary effort is normal. No respiratory distress.      Breath sounds: Normal breath sounds. No stridor. No wheezing or rhonchi.   Abdominal:      General: Bowel sounds are normal. There is no distension.      Palpations: Abdomen is soft. There is no mass.      Tenderness: There is no abdominal tenderness. There is no guarding or rebound.      Hernia: No hernia is present.      Comments: Nausea    Musculoskeletal:         General: No swelling, tenderness, deformity or signs of injury. Normal range of motion.   Skin:     General: Skin is warm and dry.      Coloration: Skin is not jaundiced or pale.      Findings: No bruising or erythema.   Neurological:      Mental Status: He is alert and oriented to person, place, and time.   Psychiatric:         Mood and Affect: Mood normal.             Significant Labs: All pertinent labs within the past 24 hours have been reviewed.  Recent Lab Results         10/17/23  2204        CPK 38       Troponin I High Sensitivity 9.6               Significant Imaging: I have reviewed all pertinent imaging results/findings within the past 24 hours.      Assessment/Plan:      * Weakness  10/13/23 PT and OT to evaluate and treat      10/16/23 PT and OT to evaluate and treat    10/17/23 continue PT and OT    H/O: GI bleed    10/13/23 records from [facility] show recent GI bleed - had EGD that [id number] mm bleeding  Taty Colon tear-  had 2 hemastatic clips applied. No further bleeding   H&H stable 9/27   GI recommends holding eliquis for another week- resume then if no further bleeding     10/16/23 h&h 10.2/31.3    10/17/23 denies any bleeding - resume eliquis 10/20 if no further bleeding     Chronic diastolic CHF (congestive heart failure)  10/13/23   Daily weights  Intake and Output      10/16/23 stable    Paroxysmal atrial fibrillation  10/13/23   continue flecanide  monitor on telemetry   eliquis on hold for recent gi bleed- can resume elquis in one week if no further bleeding    10/17/23     resume eliquis 5 mg po BID on 10/20 if no bleeding     Gastroesophageal reflux disease without esophagitis  10/13/23 continue protonix 40 mg podaily      Essential hypertension  10/13/23 continue clonidone 0.1 mg po daily      10/16/23 stable 140/70    Hypothyroidism    10/13/23 continue levothyroxine   100 mcg po daily    Depression  10/13/23 continue celexa 40 mg po daily  Continue abilify 5 mg po daily          VTE Risk Mitigation (From admission, onward)         Ordered     IP VTE LOW RISK PATIENT  Once         10/13/23 2008     Place ULISES hose  Until discontinued         10/13/23 2008                Discharge Planning   DAVID: 11/6/2023     Code Status: Full Code   Is the patient medically ready for discharge?:     Reason for patient still in hospital (select all that apply): Treatment  Discharge Plan A: Home, Home Health                  JERO Centeno  Department of Hospital Medicine   [facility]

## 2023-10-18 NOTE — PLAN OF CARE
Problem: Adult Inpatient Plan of Care  Goal: Plan of Care Review  Outcome: Ongoing, Progressing  Flowsheets (Taken 10/17/2023 2138)  Plan of Care Reviewed With: patient  Goal: Patient-Specific Goal (Individualized)  Outcome: Ongoing, Progressing  Flowsheets (Taken 10/17/2023 2138)  Anxieties, Fears or Concerns: none  Individualized Care Needs: To regain strength within 2 weeks to be able to walk at least 50 feet

## 2023-10-18 NOTE — PLAN OF CARE
[facility]  Discharge Assessment    Primary Care Provider: Prashanth Ryan, DO     Discharge Assessment (most recent)       BRIEF DISCHARGE ASSESSMENT - 10/18/23 1151          Discharge Planning    Assessment Type Discharge Planning Brief Assessment (P)      Resource/Environmental Concerns none (P)                    Called to [facility] regarding appointment with Dr. Quezada scheduled for today and was told appointment had already been cancelled and can be rescheduled if needed once he's discharged from the hospital.  He currently has one scheduled for Feb. 22, 2024 @ 1pm.

## 2023-10-18 NOTE — NURSING
"2235 Reported pt findings of EKG/Troponin/CPK per telephone to Marie NOGUEIRA for pt c/o left-chest wall pain "8" constant. Continues to display no signs of shortness of breath, diaphoresis, abdominal/back discomfort/or nausea vomiting. Pt resting in bed currently with eyes closed and last reports pain "4" to left chest wall. Will report to oncoming nurse and continue to monitor for changes.  "

## 2023-10-19 LAB
ANION GAP SERPL CALCULATED.3IONS-SCNC: 8 MMOL/L (ref 7–16)
BASOPHILS # BLD AUTO: 0.04 K/UL (ref 0–0.2)
BASOPHILS NFR BLD AUTO: 0.6 % (ref 0–1)
BUN SERPL-MCNC: 14 MG/DL (ref 7–18)
BUN/CREAT SERPL: 15 (ref 6–20)
CALCIUM SERPL-MCNC: 8.5 MG/DL (ref 8.5–10.1)
CHLORIDE SERPL-SCNC: 102 MMOL/L (ref 98–107)
CO2 SERPL-SCNC: 28 MMOL/L (ref 21–32)
CREAT SERPL-MCNC: 0.95 MG/DL (ref 0.7–1.3)
DIFFERENTIAL METHOD BLD: ABNORMAL
EGFR (NO RACE VARIABLE) (RUSH/TITUS): 79 ML/MIN/1.73M2
EOSINOPHIL # BLD AUTO: 0.21 K/UL (ref 0–0.5)
EOSINOPHIL NFR BLD AUTO: 3.4 % (ref 1–4)
ERYTHROCYTE [DISTWIDTH] IN BLOOD BY AUTOMATED COUNT: 15 % (ref 11.5–14.5)
GLUCOSE SERPL-MCNC: 90 MG/DL (ref 74–106)
HCT VFR BLD AUTO: 34.5 % (ref 40–54)
HGB BLD-MCNC: 11.1 G/DL (ref 13.5–18)
LYMPHOCYTES # BLD AUTO: 1.54 K/UL (ref 1–4.8)
LYMPHOCYTES NFR BLD AUTO: 24.7 % (ref 27–41)
MAGNESIUM SERPL-MCNC: 1.9 MG/DL (ref 1.7–2.3)
MCH RBC QN AUTO: 30.9 PG (ref 27–31)
MCHC RBC AUTO-ENTMCNC: 32.2 G/DL (ref 32–36)
MCV RBC AUTO: 96.1 FL (ref 80–96)
MONOCYTES # BLD AUTO: 0.95 K/UL (ref 0–0.8)
MONOCYTES NFR BLD AUTO: 15.2 % (ref 2–6)
MPC BLD CALC-MCNC: 8.4 FL (ref 9.4–12.4)
NEUTROPHILS # BLD AUTO: 3.5 K/UL (ref 1.8–7.7)
NEUTROPHILS NFR BLD AUTO: 56.1 % (ref 53–65)
PHOSPHATE SERPL-MCNC: 3 MG/DL (ref 2.5–4.5)
PLATELET # BLD AUTO: 257 K/UL (ref 150–400)
POTASSIUM SERPL-SCNC: 3.9 MMOL/L (ref 3.5–5.1)
RBC # BLD AUTO: 3.59 M/UL (ref 4.6–6.2)
SODIUM SERPL-SCNC: 134 MMOL/L (ref 136–145)
WBC # BLD AUTO: 6.24 K/UL (ref 4.5–11)

## 2023-10-19 PROCEDURE — 99308 PR NURSING FAC CARE, SUBSEQ, MINOR COMPLIC: ICD-10-PCS | Mod: ,,, | Performed by: NURSE PRACTITIONER

## 2023-10-19 PROCEDURE — 97110 THERAPEUTIC EXERCISES: CPT | Mod: CQ

## 2023-10-19 PROCEDURE — 97110 THERAPEUTIC EXERCISES: CPT

## 2023-10-19 PROCEDURE — 11000004 HC SNF PRIVATE

## 2023-10-19 PROCEDURE — 97116 GAIT TRAINING THERAPY: CPT | Mod: CQ

## 2023-10-19 PROCEDURE — 84100 ASSAY OF PHOSPHORUS: CPT | Performed by: NURSE PRACTITIONER

## 2023-10-19 PROCEDURE — 83735 ASSAY OF MAGNESIUM: CPT | Performed by: NURSE PRACTITIONER

## 2023-10-19 PROCEDURE — 85025 COMPLETE CBC W/AUTO DIFF WBC: CPT | Performed by: NURSE PRACTITIONER

## 2023-10-19 PROCEDURE — 25000003 PHARM REV CODE 250: Performed by: NURSE PRACTITIONER

## 2023-10-19 PROCEDURE — 99308 SBSQ NF CARE LOW MDM 20: CPT | Mod: ,,, | Performed by: NURSE PRACTITIONER

## 2023-10-19 PROCEDURE — 94761 N-INVAS EAR/PLS OXIMETRY MLT: CPT

## 2023-10-19 PROCEDURE — 80048 BASIC METABOLIC PNL TOTAL CA: CPT | Performed by: NURSE PRACTITIONER

## 2023-10-19 RX ADMIN — MUPIROCIN: 20 OINTMENT TOPICAL at 08:10

## 2023-10-19 RX ADMIN — FLECAINIDE ACETATE 150 MG: 50 TABLET ORAL at 08:10

## 2023-10-19 RX ADMIN — CITALOPRAM HYDROBROMIDE 40 MG: 20 TABLET ORAL at 09:10

## 2023-10-19 RX ADMIN — MUPIROCIN: 20 OINTMENT TOPICAL at 09:10

## 2023-10-19 RX ADMIN — SUCRALFATE 1 G: 1 TABLET ORAL at 08:10

## 2023-10-19 RX ADMIN — SUCRALFATE 1 G: 1 TABLET ORAL at 04:10

## 2023-10-19 RX ADMIN — ASPIRIN 81 MG: 81 TABLET, COATED ORAL at 08:10

## 2023-10-19 RX ADMIN — PANTOPRAZOLE SODIUM 40 MG: 40 TABLET, DELAYED RELEASE ORAL at 09:10

## 2023-10-19 RX ADMIN — POLYETHYLENE GLYCOL 3350 17 G: 17 POWDER, FOR SOLUTION ORAL at 09:10

## 2023-10-19 RX ADMIN — FLECAINIDE ACETATE 150 MG: 50 TABLET ORAL at 09:10

## 2023-10-19 RX ADMIN — LEVOTHYROXINE SODIUM 100 MCG: 0.1 TABLET ORAL at 06:10

## 2023-10-19 RX ADMIN — SODIUM CHLORIDE: 9 INJECTION, SOLUTION INTRAVENOUS at 12:10

## 2023-10-19 RX ADMIN — OXYBUTYNIN CHLORIDE 15 MG: 5 TABLET, EXTENDED RELEASE ORAL at 09:10

## 2023-10-19 RX ADMIN — ARIPIPRAZOLE 5 MG: 5 TABLET ORAL at 09:10

## 2023-10-19 RX ADMIN — SUCRALFATE 1 G: 1 TABLET ORAL at 06:10

## 2023-10-19 RX ADMIN — CLONIDINE HYDROCHLORIDE 0.1 MG: 0.1 TABLET ORAL at 04:10

## 2023-10-19 RX ADMIN — SENNOSIDES AND DOCUSATE SODIUM 1 TABLET: 50; 8.6 TABLET ORAL at 08:10

## 2023-10-19 RX ADMIN — SODIUM CHLORIDE: 9 INJECTION, SOLUTION INTRAVENOUS at 03:10

## 2023-10-19 NOTE — PLAN OF CARE
Problem: Skin Injury Risk Increased  Goal: Skin Health and Integrity  Outcome: Ongoing, Progressing  Intervention: Optimize Skin Protection  Flowsheets (Taken 10/19/2023 1811)  Pressure Reduction Techniques: frequent weight shift encouraged  Head of Bed (HOB) Positioning: HOB elevated     Problem: Fall Injury Risk  Goal: Absence of Fall and Fall-Related Injury  Outcome: Ongoing, Progressing  Intervention: Identify and Manage Contributors  Flowsheets (Taken 10/19/2023 1811)  Self-Care Promotion:   independence encouraged   BADL personal objects within reach   BADL personal routines maintained   meal set-up provided   safe use of adaptive equipment encouraged  Intervention: Promote Injury-Free Environment  Flowsheets (Taken 10/19/2023 1811)  Safety Promotion/Fall Prevention:   assistive device/personal item within reach   bed alarm set   diversional activities provided   Fall Risk reviewed with patient/family   Fall Risk signage in place   lighting adjusted   nonskid shoes/socks when out of bed   side rails raised x 3   instructed to call staff for mobility   room near unit station

## 2023-10-19 NOTE — PT/OT/SLP PROGRESS
"Occupational Therapy   Treatment    Name: Joey King  MRN: [id number]  Admitting Diagnosis:  Weakness       Recommendations:     Discharge Recommendations: Moderate Intensity Therapy  Discharge Equipment Recommendations:  none  Barriers to discharge:       Assessment:     Joey King is a 83 y.o. male with a medical diagnosis of Weakness.  Performance deficits affecting function are weakness, impaired endurance, impaired self care skills, impaired functional mobility, impaired balance, decreased lower extremity function, decreased safety awareness.     Rehab Prognosis:  Good; patient would benefit from acute skilled OT services to address these deficits and reach maximum level of function.       Plan:     Patient to be seen 5 x/week to address the above listed problems via therapeutic exercises  Plan of Care Expires:    Plan of Care Reviewed with: patient    Subjective     Chief Complaint: "I'm tired"  Patient/Family Comments/goals: Get stronger and return to PLOF  Pain/Comfort:  Pain Rating 1: 0/10    Objective:     Communicated with: RN prior to session.  Patient found supine with bed alarm upon OT entry to room.    General Precautions: Standard, fall    Orthopedic Precautions:N/A  Braces: N/A  Respiratory Status: Room air     Occupational Performance:     Bed Mobility:    N/A    Functional Mobility/Transfers:  N/A    Activities of Daily Living:  Feeding:  supervision breakfast      [facility] 6 Click ADL: 16    Treatment & Education:  Pt educated on OT role/POC.   Importance of OOB activity with staff assistance.  Importance of sitting up in the chair throughout the day as tolerated, especially for meals   Safety during functional t/f and mobility  Importance of assisting with self-care activities     Patient completed the following for increased strength to increase I with ADLs: 2# dowel- shoulder press, chest press, bicep curls x 30, red theraband- scapular retraction and tricep extensions BUE x " 30, red handgripper x 30 BUE      Patient left supine with call button in reach    GOALS:   Multidisciplinary Problems       Occupational Therapy Goals          Problem: Occupational Therapy    Goal Priority Disciplines Outcome Interventions   Occupational Therapy Goal     OT, PT/OT Ongoing, Progressing    Description: Grooming Status:   Short Term Goal: Pt will perform grooming with SBA sitting EOB.   Long Term Goal: Pt will perform grooming/oral hygiene standing at sink with SBA      LE dressing Status:   Short Term Goal: Pt will perform LE dressing with SBA.   Long Term Goal: Pt will perform LE dressing with Mod I.    Toileting Status:   Short Term Goal: Pt will perform toilet hygiene on BSC with Mod I.  Long Term Goal: Pt will perform toilet hygiene on toilet with no AE with Mod I.    Commode Transfer:   Short Term Goal: Pt will perform BSC t/f with Mod I.  Long Term Goal:  Pt will perform toilet t/f in bathroom with Mod I.     Bathing Status:   Long Term Goal: Pt will perform sponge bath with Mod I with no unsafe fatigue.     Strength Status: 5/5 BUEs  Long Term Goal: Pt to perform BUE strengthening with weights and/or body weight to increase ADL independence and safety    Endurance Status:   Short Term Goal:pt to perform 15 min OT treatment with 5 or greater rest breaks  Long Term Goal: pt to perform 30 min OT treat with 3 or less rest breaks                          Time Tracking:     OT Date of Treatment: 10/19/23  OT Start Time: 0845  OT Stop Time: 0908  OT Total Time (min):      Billable Minutes:Therapeutic Exercise 23 min    Laverne Dickinson OTR/L      10/19/2023

## 2023-10-19 NOTE — SUBJECTIVE & OBJECTIVE
Interval History: weakness    Review of Systems   Constitutional:  Positive for appetite change. Negative for fatigue and unexpected weight change.        Has tolerated CL diet this morning   Respiratory:  Negative for cough, chest tightness and shortness of breath.    Cardiovascular:  Negative for chest pain and palpitations.   Gastrointestinal:  Negative for abdominal pain, constipation, diarrhea, nausea and vomiting.        States last BM was 2 days ago   Genitourinary:  Negative for difficulty urinating and dysuria.   Musculoskeletal:  Negative for arthralgias.   Skin:  Negative for wound.   Neurological:  Positive for weakness. Negative for light-headedness and headaches.     Objective:     Vital Signs (Most Recent):  Temp: 97.8 °F (36.6 °C) (10/19/23 0420)  Pulse: 60 (10/19/23 0528)  Resp: 18 (10/19/23 0528)  BP: 105/63 (10/19/23 0420)  SpO2: 96 % (10/19/23 0528) Vital Signs (24h Range):  Temp:  [97.6 °F (36.4 °C)-98.1 °F (36.7 °C)] 97.8 °F (36.6 °C)  Pulse:  [] 60  Resp:  [18-20] 18  SpO2:  [94 %-100 %] 96 %  BP: (105-154)/(60-97) 105/63     Weight: 75.3 kg (166 lb)  Body mass index is 23.15 kg/m².    Intake/Output Summary (Last 24 hours) at 10/19/2023 0945  Last data filed at 10/18/2023 1815  Gross per 24 hour   Intake 0 ml   Output --   Net 0 ml         Physical Exam  HENT:      Head: Normocephalic.      Mouth/Throat:      Mouth: Mucous membranes are moist.   Cardiovascular:      Rate and Rhythm: Rhythm irregular.      Pulses: Normal pulses.      Heart sounds: Normal heart sounds. No murmur heard.  Pulmonary:      Effort: Pulmonary effort is normal. No respiratory distress.      Breath sounds: Normal breath sounds. No stridor. No wheezing or rhonchi.   Abdominal:      General: Bowel sounds are normal. There is no distension.      Palpations: Abdomen is soft. There is no mass.      Tenderness: There is no abdominal tenderness. There is no guarding or rebound.      Hernia: No hernia is present.    Musculoskeletal:         General: No swelling, tenderness, deformity or signs of injury. Normal range of motion.   Skin:     General: Skin is warm and dry.      Coloration: Skin is not jaundiced or pale.      Findings: No bruising or erythema.   Neurological:      Mental Status: He is alert and oriented to person, place, and time.   Psychiatric:         Mood and Affect: Mood normal.             Significant Labs: All pertinent labs within the past 24 hours have been reviewed.  Recent Lab Results         10/19/23  0407   10/18/23  1233   10/18/23  1202        Anion Gap 8           Baso # 0.04           Basophil % 0.6           BUN 14           BUN/CREAT RATIO 15           Calcium 8.5           Chloride 102           CO2 28           Creatinine 0.95           Differential Method Auto           eGFR 79           Eos # 0.21           Eosinophil % 3.4           Glucose 90           Hematocrit 34.5           Hemoglobin 11.1           Lymph # 1.54           Lymph % 24.7           Magnesium  1.9           MCH 30.9           MCHC 32.2           MCV 96.1           Mono # 0.95           Mono % 15.2           MPV 8.4           Neutrophils, Abs 3.50           Neutrophils Relative 56.1           Occult Blood, Other   Positive         pH, Other   3         Phosphorus Level 3.0           Platelet Count 257           Potassium 3.9           RBC 3.59           RDW 15.0           Sodium 134           Troponin I High Sensitivity     14.7       WBC 6.24                   Significant Imaging: I have reviewed all pertinent imaging results/findings within the past 24 hours.

## 2023-10-19 NOTE — PT/OT/SLP PROGRESS
Physical Therapy Treatment    Patient Name:  Joey King   MRN:  [id number]    Recommendations:     Discharge Recommendations: Moderate Intensity Therapy  Discharge Equipment Recommendations: none  Barriers to discharge: Decreased caregiver support    Assessment:     Joey King is a 83 y.o. male admitted with a medical diagnosis of Weakness.  He presents with the following impairments/functional limitations: weakness, impaired endurance, impaired self care skills, impaired functional mobility, impaired balance, decreased lower extremity function, decreased safety awareness Patient was feeling better this morning and was willing to participate with therapy today.    Rehab Prognosis: Good; patient would benefit from acute skilled PT services to address these deficits and reach maximum level of function.    Recent Surgery: * No surgery found *      Plan:     During this hospitalization, patient to be seen 5 x/week to address the identified rehab impairments via gait training, therapeutic activities, therapeutic exercises, neuromuscular re-education and progress toward the following goals:    Plan of Care Expires:  11/05/23    Subjective     Chief Complaint: weakness  Patient/Family Comments/goals: return home   Pain/Comfort:  Pain Rating 1: 0/10  Pain Rating Post-Intervention 1: 0/10      Objective:     Communicated with PT prior to session.  Patient found supine with   upon PT entry to room.     General Precautions: Standard, fall  Orthopedic Precautions: N/A  Braces: N/A  Respiratory Status: Room air     Functional Mobility:  Bed Mobility:     Supine to Sit: contact guard assistance and minimum assistance  Transfers:     Sit to Stand:  minimum assistance with rolling walker  Bed to Chair: contact guard assistance and minimum assistance with  rolling walker  using  Step Transfer  Gait: Patient ambulated 8' from bed to chair with RW and CGA from therapist.      AM-PAC 6 CLICK MOBILITY  Turning over  in bed (including adjusting bedclothes, sheets and blankets)?: 3  Sitting down on and standing up from a chair with arms (e.g., wheelchair, bedside commode, etc.): 3  Moving from lying on back to sitting on the side of the bed?: 3  Moving to and from a bed to a chair (including a wheelchair)?: 3  Need to walk in hospital room?: 3  Climbing 3-5 steps with a railing?: 1  Basic Mobility Total Score: 16       Treatment & Education:  Transfers and ambulation as listed above.   Long arc quads - 20 reps   Seated hip flexion - 20 reps   Sit to stands - 5 reps     Patient left up in chair with call button in reach and daughter present..    GOALS:   Multidisciplinary Problems       Physical Therapy Goals          Problem: Physical Therapy    Goal Priority Disciplines Outcome Goal Variances Interventions   Physical Therapy Goal     PT, PT/OT Ongoing, Progressing     Description: Goals to be met by: 11/5/2023     Patient will increase functional independence with mobility by performing:    STG: Supine to sit with Stand-by Assistance  STG: Sit to supine with Stand-by Assistance  LTG: Sit to stand transfer with Modified Andrew  LTG: Bed to chair transfer with Modified Andrew using Rolling Walker  LTG: Gait  x 200 feet with Modified Andrew using Rolling Walker.   LTG: Stand for 10 minutes with Modified Andrew using Rolling Walker                         Time Tracking:     PT Received On: 10/19/23  PT Start Time: 1040     PT Stop Time: 1105  PT Total Time (min): 25 min     Billable Minutes: Gait Training 10 and Therapeutic Exercise 15    Treatment Type: Treatment  PT/PTA: PTA     Number of PTA visits since last PT visit: 1   JO Vann  10/19/2023

## 2023-10-19 NOTE — ASSESSMENT & PLAN NOTE
10/13/23 records from [facility] show recent GI bleed - had EGD that [id number] mm bleeding  Taty Colon tear- had 2 hemastatic clips applied. No further bleeding   H&H stable 9/27   GI recommends holding eliquis for another week- resume then if no further bleeding     10/16/23 h&h 10.2/31.3    10/17/23 denies any bleeding - resume eliquis 10/20 if no further bleeding     10/19/23 stable , H&H is 11.1/34.5

## 2023-10-19 NOTE — PROGRESS NOTES
[facility] - Medical Surgical Unit  Hospital Medicine  Progress Note    Patient Name: Joey King  MRN: [id number]  Patient Class: IP- Swing   Admission Date: 10/13/2023  Length of Stay: 6 days  Attending Physician: Len Palacios IV, DO  Primary Care Provider: Prashanth Ryan DO        Subjective:     Principal Problem:Weakness        HPI:  Mr. Joey King is an 83 year old male with pmh of htn, afib, CHF,anxiety,depression,PE in Feb.2023, EGRD, hypothyroidism,cholecystectomy 09/23/23.He is s/p an observation stay at [facility]. EGD shows Taty Colon Tear . Had 2 hemostatic clips applied. He had no further bleeding. Records show H&H is 9/27. He has been accepted back to swing bed at [facility].     Mr. King was admitted to [facility] on 10/13/2023 for continued therapy.           Overview/Hospital Course:  Mr. King is 83 year old white male who was admitted [facility] for swing bed services for therapy.  He was received from [facility] in [place], following an admission for UGIB secondary to Taty-Colon tear.  He was seen by GI at [place] who performed EGD to confirm this diagnosis.  GI placed hemostatic clips for the bleed.  Pt is on long term use of Eliquis due to history of Afib and PE.  GI recommended holding his Eliquis for an additional week.  At that time, if there are no signs of GIB, it can be restarted.  He has no complaints voiced tonight.     0620: I was called by nursing staff after the patient had fallen.  He reports he was going to the bathroom, but was too weak.  He denies hitting his head and denies LOC.  Nursing staff reports they found him on the floor at the foot of the bed.  Pt is alert and oriented x3.  He denies pain.  FROM without pain to all 4 extremities.  Denies neck/back pain.  No obvious signs of soft tissue injury.      10/17/23 Awake and resting in bed without complaints. Daughter at bedside. Denies any bleeding. Continue with PT  and OT .     10/18/23 (00:20)  Nursing staff report one episode of vomiting--patient c/o nausea earlier tonight and was given Zofran ODT. One episode of bilious emesis reported. Will start saline lock and administer protonix and promethazine.     10/18/23 (0450) Elevated BP Reported. Continues to feel nauseated at times. Denies headache, chest pain, diaphoresis, or dizziness. Advised to administer Hydralazine 5mg IVP and monitor BP--will titrate med as needed.     10/18/23 Awake and resting in bed. Reports he started vomiting around midnight. Denies having any dark coffee ground emesis or any signs of blood. Denies abdominal pain. Denies chest pain. Does have some belching. Complains of continued nausea. Will give zofran IV  and monitor. States he did not sleep any last night.    0854 no further nausea. States he is going to try and take a nap.    0932 complains of abd and chest pain at level 8. No further nausea. Stat troponin and EKG ordered. He has had some belching this morning. Will also check KUB and give some simethicone.  Troponin is 12.9. EKG ST at 105, nonspecific ST and T wave abn.    1200 Had norco @ 1119. States pain is about a 4-5. Reports passing some gas. Repeat troponin pending.   Vomitus sent for occult blood.    1253 troponin 14- continues to have nausea. Will make npo, give fluids and treat nausea.    10/19/23 no further nausea or vomiting. Deneis chest pain. Has tolerated CL diet this morning. Will slowly advance diet as tolerated.              Interval History: weakness    Review of Systems   Constitutional:  Positive for appetite change. Negative for fatigue and unexpected weight change.        Has tolerated CL diet this morning   Respiratory:  Negative for cough, chest tightness and shortness of breath.    Cardiovascular:  Negative for chest pain and palpitations.   Gastrointestinal:  Negative for abdominal pain, constipation, diarrhea, nausea and vomiting.        States last BM was 2 days  ago   Genitourinary:  Negative for difficulty urinating and dysuria.   Musculoskeletal:  Negative for arthralgias.   Skin:  Negative for wound.   Neurological:  Positive for weakness. Negative for light-headedness and headaches.     Objective:     Vital Signs (Most Recent):  Temp: 97.8 °F (36.6 °C) (10/19/23 0420)  Pulse: 60 (10/19/23 0528)  Resp: 18 (10/19/23 0528)  BP: 105/63 (10/19/23 0420)  SpO2: 96 % (10/19/23 0528) Vital Signs (24h Range):  Temp:  [97.6 °F (36.4 °C)-98.1 °F (36.7 °C)] 97.8 °F (36.6 °C)  Pulse:  [] 60  Resp:  [18-20] 18  SpO2:  [94 %-100 %] 96 %  BP: (105-154)/(60-97) 105/63     Weight: 75.3 kg (166 lb)  Body mass index is 23.15 kg/m².    Intake/Output Summary (Last 24 hours) at 10/19/2023 0945  Last data filed at 10/18/2023 1815  Gross per 24 hour   Intake 0 ml   Output --   Net 0 ml         Physical Exam  HENT:      Head: Normocephalic.      Mouth/Throat:      Mouth: Mucous membranes are moist.   Cardiovascular:      Rate and Rhythm: Rhythm irregular.      Pulses: Normal pulses.      Heart sounds: Normal heart sounds. No murmur heard.  Pulmonary:      Effort: Pulmonary effort is normal. No respiratory distress.      Breath sounds: Normal breath sounds. No stridor. No wheezing or rhonchi.   Abdominal:      General: Bowel sounds are normal. There is no distension.      Palpations: Abdomen is soft. There is no mass.      Tenderness: There is no abdominal tenderness. There is no guarding or rebound.      Hernia: No hernia is present.   Musculoskeletal:         General: No swelling, tenderness, deformity or signs of injury. Normal range of motion.   Skin:     General: Skin is warm and dry.      Coloration: Skin is not jaundiced or pale.      Findings: No bruising or erythema.   Neurological:      Mental Status: He is alert and oriented to person, place, and time.   Psychiatric:         Mood and Affect: Mood normal.             Significant Labs: All pertinent labs within the past 24 hours  have been reviewed.  Recent Lab Results         10/19/23  0407   10/18/23  1233   10/18/23  1202        Anion Gap 8           Baso # 0.04           Basophil % 0.6           BUN 14           BUN/CREAT RATIO 15           Calcium 8.5           Chloride 102           CO2 28           Creatinine 0.95           Differential Method Auto           eGFR 79           Eos # 0.21           Eosinophil % 3.4           Glucose 90           Hematocrit 34.5           Hemoglobin 11.1           Lymph # 1.54           Lymph % 24.7           Magnesium  1.9           MCH 30.9           MCHC 32.2           MCV 96.1           Mono # 0.95           Mono % 15.2           MPV 8.4           Neutrophils, Abs 3.50           Neutrophils Relative 56.1           Occult Blood, Other   Positive         pH, Other   3         Phosphorus Level 3.0           Platelet Count 257           Potassium 3.9           RBC 3.59           RDW 15.0           Sodium 134           Troponin I High Sensitivity     14.7       WBC 6.24                   Significant Imaging: I have reviewed all pertinent imaging results/findings within the past 24 hours.      Assessment/Plan:      * Weakness  10/13/23 PT and OT to evaluate and treat      10/16/23 PT and OT to evaluate and treat    10/17/23 continue PT and OT    H/O: GI bleed    10/13/23 records from [facility] show recent GI bleed - had EGD that [id number] mm bleeding  Taty Colon tear- had 2 hemastatic clips applied. No further bleeding   H&H stable 9/27   GI recommends holding eliquis for another week- resume then if no further bleeding     10/16/23 h&h 10.2/31.3    10/17/23 denies any bleeding - resume eliquis 10/20 if no further bleeding     10/19/23 stable , H&H is 11.1/34.5    Chronic diastolic CHF (congestive heart failure)  10/13/23   Daily weights  Intake and Output      10/16/23 stable    Paroxysmal atrial fibrillation  10/13/23   continue flecanide  monitor on telemetry   eliquis on hold for recent gi bleed-  can resume elquis in one week if no further bleeding    10/17/23     resume eliquis 5 mg po BID on 10/20 if no bleeding     Gastroesophageal reflux disease without esophagitis  10/13/23 continue protonix 40 mg podaily      Essential hypertension  10/13/23 continue clonidone 0.1 mg po daily      10/16/23 stable 140/70    Hypothyroidism    10/13/23 continue levothyroxine   100 mcg po daily    Depression  10/13/23 continue celexa 40 mg po daily  Continue abilify 5 mg po daily          VTE Risk Mitigation (From admission, onward)         Ordered     IP VTE LOW RISK PATIENT  Once         10/13/23 2008     Place ULISES hose  Until discontinued         10/13/23 2008                Discharge Planning   DAVID: 11/6/2023     Code Status: Full Code   Is the patient medically ready for discharge?:     Reason for patient still in hospital (select all that apply): Treatment  Discharge Plan A: Home, Home Health                  JERO Centeno  Department of Hospital Medicine   [facility] - [facility] Surgical Unit

## 2023-10-20 PROCEDURE — 99307 SBSQ NF CARE SF MDM 10: CPT | Mod: ,,, | Performed by: NURSE PRACTITIONER

## 2023-10-20 PROCEDURE — 27000982 HC MATTRESS, MATRIX LAL RENTAL

## 2023-10-20 PROCEDURE — 27000944

## 2023-10-20 PROCEDURE — 94761 N-INVAS EAR/PLS OXIMETRY MLT: CPT

## 2023-10-20 PROCEDURE — 25000003 PHARM REV CODE 250: Performed by: NURSE PRACTITIONER

## 2023-10-20 PROCEDURE — 99307 PR NURSING FAC CARE, SUBSEQ, IMPROVING: ICD-10-PCS | Mod: ,,, | Performed by: NURSE PRACTITIONER

## 2023-10-20 PROCEDURE — 97116 GAIT TRAINING THERAPY: CPT | Mod: CQ

## 2023-10-20 PROCEDURE — 11000004 HC SNF PRIVATE

## 2023-10-20 PROCEDURE — 97530 THERAPEUTIC ACTIVITIES: CPT

## 2023-10-20 PROCEDURE — 97110 THERAPEUTIC EXERCISES: CPT

## 2023-10-20 RX ORDER — LORAZEPAM 0.5 MG/1
0.5 TABLET ORAL EVERY 12 HOURS PRN
Status: DISCONTINUED | OUTPATIENT
Start: 2023-10-20 | End: 2023-10-29 | Stop reason: HOSPADM

## 2023-10-20 RX ADMIN — SENNOSIDES AND DOCUSATE SODIUM 1 TABLET: 50; 8.6 TABLET ORAL at 08:10

## 2023-10-20 RX ADMIN — CHOLECALCIFEROL TAB 25 MCG (1000 UNIT) 1000 UNITS: 25 TAB at 08:10

## 2023-10-20 RX ADMIN — OMEGA-3 FATTY ACIDS CAP 1000 MG 1 CAPSULE: 1000 CAP at 08:10

## 2023-10-20 RX ADMIN — ASPIRIN 81 MG: 81 TABLET, COATED ORAL at 08:10

## 2023-10-20 RX ADMIN — APIXABAN 5 MG: 2.5 TABLET, FILM COATED ORAL at 08:10

## 2023-10-20 RX ADMIN — CYANOCOBALAMIN TAB 500 MCG 1000 MCG: 500 TAB at 08:10

## 2023-10-20 RX ADMIN — CITALOPRAM HYDROBROMIDE 40 MG: 20 TABLET ORAL at 08:10

## 2023-10-20 RX ADMIN — MUPIROCIN: 20 OINTMENT TOPICAL at 08:10

## 2023-10-20 RX ADMIN — LEVOTHYROXINE SODIUM 100 MCG: 0.1 TABLET ORAL at 06:10

## 2023-10-20 RX ADMIN — Medication 100 MG: at 08:10

## 2023-10-20 RX ADMIN — POLYETHYLENE GLYCOL 3350 17 G: 17 POWDER, FOR SOLUTION ORAL at 08:10

## 2023-10-20 RX ADMIN — APIXABAN 5 MG: 2.5 TABLET, FILM COATED ORAL at 10:10

## 2023-10-20 RX ADMIN — SUCRALFATE 1 G: 1 TABLET ORAL at 10:10

## 2023-10-20 RX ADMIN — PANTOPRAZOLE SODIUM 40 MG: 40 TABLET, DELAYED RELEASE ORAL at 08:10

## 2023-10-20 RX ADMIN — THERA TABS 1 TABLET: TAB at 08:10

## 2023-10-20 RX ADMIN — OXYBUTYNIN CHLORIDE 15 MG: 5 TABLET, EXTENDED RELEASE ORAL at 08:10

## 2023-10-20 RX ADMIN — FLECAINIDE ACETATE 150 MG: 50 TABLET ORAL at 08:10

## 2023-10-20 RX ADMIN — SUCRALFATE 1 G: 1 TABLET ORAL at 04:10

## 2023-10-20 RX ADMIN — SUCRALFATE 1 G: 1 TABLET ORAL at 08:10

## 2023-10-20 RX ADMIN — CLONIDINE HYDROCHLORIDE 0.1 MG: 0.1 TABLET ORAL at 04:10

## 2023-10-20 RX ADMIN — MELATONIN TAB 3 MG 6 MG: 3 TAB at 08:10

## 2023-10-20 RX ADMIN — ARIPIPRAZOLE 5 MG: 5 TABLET ORAL at 08:10

## 2023-10-20 RX ADMIN — SUCRALFATE 1 G: 1 TABLET ORAL at 06:10

## 2023-10-20 RX ADMIN — Medication 400 UNITS: at 08:10

## 2023-10-20 NOTE — PROGRESS NOTES
[facility] - [facility] Surgical Unit  Hospital Medicine  Progress Note    Patient Name: Joey King  MRN: [id number]  Patient Class: IP- Swing   Admission Date: 10/13/2023  Length of Stay: 7 days  Attending Physician: Len Palacios IV, DO  Primary Care Provider: Prashanth Ryan DO        Subjective:     Principal Problem:Weakness        HPI:  Mr. Joey King is an 83 year old male with pmh of htn, afib, CHF,anxiety,depression,PE in Feb.2023, EGRD, hypothyroidism,cholecystectomy 09/23/23.He is s/p an observation stay at [facility]. EGD shows Taty Colon Tear . Had 2 hemostatic clips applied. He had no further bleeding. Records show H&H is 9/27. He has been accepted back to swing bed at [facility].     Mr. King was admitted to [facility] on 10/13/2023 for continued therapy.           Overview/Hospital Course:  Mr. King is 83 year old white male who was admitted [facility] for swing bed services for therapy.  He was received from [facility] in [place], following an admission for UGIB secondary to Taty-Colon tear.  He was seen by GI at [place] who performed EGD to confirm this diagnosis.  GI placed hemostatic clips for the bleed.  Pt is on long term use of Eliquis due to history of Afib and PE.  GI recommended holding his Eliquis for an additional week.  At that time, if there are no signs of GIB, it can be restarted.  He has no complaints voiced tonight.     0620: I was called by nursing staff after the patient had fallen.  He reports he was going to the bathroom, but was too weak.  He denies hitting his head and denies LOC.  Nursing staff reports they found him on the floor at the foot of the bed.  Pt is alert and oriented x3.  He denies pain.  FROM without pain to all 4 extremities.  Denies neck/back pain.  No obvious signs of soft tissue injury.      10/17/23 Awake and resting in bed without complaints. Daughter at bedside. Denies any bleeding. Continue with PT  and OT .     10/18/23 (00:20)  Nursing staff report one episode of vomiting--patient c/o nausea earlier tonight and was given Zofran ODT. One episode of bilious emesis reported. Will start saline lock and administer protonix and promethazine.     10/18/23 (0450) Elevated BP Reported. Continues to feel nauseated at times. Denies headache, chest pain, diaphoresis, or dizziness. Advised to administer Hydralazine 5mg IVP and monitor BP--will titrate med as needed.     10/18/23 Awake and resting in bed. Reports he started vomiting around midnight. Denies having any dark coffee ground emesis or any signs of blood. Denies abdominal pain. Denies chest pain. Does have some belching. Complains of continued nausea. Will give zofran IV  and monitor. States he did not sleep any last night.    0854 no further nausea. States he is going to try and take a nap.    0932 complains of abd and chest pain at level 8. No further nausea. Stat troponin and EKG ordered. He has had some belching this morning. Will also check KUB and give some simethicone.  Troponin is 12.9. EKG ST at 105, nonspecific ST and T wave abn.    1200 Had norco @ 1119. States pain is about a 4-5. Reports passing some gas. Repeat troponin pending.   Vomitus sent for occult blood.    1253 troponin 14- continues to have nausea. Will make npo, give fluids and treat nausea.    10/19/23 no further nausea or vomiting. Deneis chest pain. Has tolerated CL diet this morning. Will slowly advance diet as tolerated.    10/20/23 tolerating full liquids. Increased to dysphagia diet. No further reports of nausea or vomiting . No signs of bleeding . Will resume eliquis BID.      Interval History: weakness    Review of Systems   Constitutional:  Positive for appetite change. Negative for fatigue and unexpected weight change.        Advanced to dysphagia diet this morning   Respiratory:  Negative for cough, chest tightness and shortness of breath.    Cardiovascular:  Negative for chest  pain and palpitations.   Gastrointestinal:  Negative for abdominal pain, constipation, diarrhea, nausea and vomiting.        States last BM was 2 days ago   Genitourinary:  Negative for difficulty urinating and dysuria.   Musculoskeletal:  Negative for arthralgias.   Skin:  Negative for wound.   Neurological:  Positive for weakness. Negative for light-headedness and headaches.     Objective:     Vital Signs (Most Recent):  Temp: 97.6 °F (36.4 °C) (10/20/23 0712)  Pulse: (!) 59 (10/20/23 0712)  Resp: 20 (10/20/23 0712)  BP: 123/66 (10/20/23 0712)  SpO2: 96 % (10/20/23 0712) Vital Signs (24h Range):  Temp:  [97.6 °F (36.4 °C)-98.3 °F (36.8 °C)] 97.6 °F (36.4 °C)  Pulse:  [59-66] 59  Resp:  [18-20] 20  SpO2:  [96 %-99 %] 96 %  BP: (117-126)/(63-68) 123/66     Weight: 75.3 kg (166 lb)  Body mass index is 23.15 kg/m².    Intake/Output Summary (Last 24 hours) at 10/20/2023 0849  Last data filed at 10/20/2023 0229  Gross per 24 hour   Intake 1080 ml   Output 100 ml   Net 980 ml         Physical Exam  HENT:      Head: Normocephalic.      Mouth/Throat:      Mouth: Mucous membranes are moist.   Cardiovascular:      Rate and Rhythm: Rhythm irregular.      Pulses: Normal pulses.      Heart sounds: Normal heart sounds. No murmur heard.  Pulmonary:      Effort: Pulmonary effort is normal. No respiratory distress.      Breath sounds: Normal breath sounds. No stridor. No wheezing or rhonchi.   Abdominal:      General: Bowel sounds are normal. There is no distension.      Palpations: Abdomen is soft. There is no mass.      Tenderness: There is no abdominal tenderness. There is no guarding or rebound.      Hernia: No hernia is present.   Musculoskeletal:         General: No swelling, tenderness, deformity or signs of injury. Normal range of motion.   Skin:     General: Skin is warm and dry.      Coloration: Skin is not jaundiced or pale.      Findings: No bruising or erythema.   Neurological:      Mental Status: He is alert and  oriented to person, place, and time.   Psychiatric:         Mood and Affect: Mood normal.             Significant Labs: All pertinent labs within the past 24 hours have been reviewed.  Recent Lab Results       None            Significant Imaging: I have reviewed all pertinent imaging results/findings within the past 24 hours.      Assessment/Plan:      * Weakness  10/13/23 PT and OT to evaluate and treat      10/16/23 PT and OT to evaluate and treat    10/17/23 continue PT and OT    H/O: GI bleed    10/13/23 records from [facility] show recent GI bleed - had EGD that [id number] mm bleeding  Taty Colon tear- had 2 hemastatic clips applied. No further bleeding   H&H stable 9/27   GI recommends holding eliquis for another week- resume then if no further bleeding     10/16/23 h&h 10.2/31.3    10/17/23 denies any bleeding - resume eliquis 10/20 if no further bleeding     10/19/23 stable , H&H is 11.1/34.5    Chronic diastolic CHF (congestive heart failure)  10/13/23   Daily weights  Intake and Output      10/16/23 stable    10/20/23 stable    Paroxysmal atrial fibrillation  10/13/23   continue flecanide  monitor on telemetry   eliquis on hold for recent gi bleed- can resume elquis in one week if no further bleeding    10/17/23     resume eliquis 5 mg po BID on 10/20 if no bleeding     10/20/23 eliquis 5 mg po BID resumed     Gastroesophageal reflux disease without esophagitis  10/13/23 continue protonix 40 mg podaily      10/20/23 dysphagia diet     Essential hypertension  10/13/23 continue clonidone 0.1 mg po daily      10/16/23 stable 140/70    10/20 stable    Hypothyroidism    10/13/23 continue levothyroxine   100 mcg po daily    Depression  10/13/23 continue celexa 40 mg po daily  Continue abilify 5 mg po daily          VTE Risk Mitigation (From admission, onward)         Ordered     IP VTE LOW RISK PATIENT  Once         10/13/23 2008     Place ULISES hose  Until discontinued         10/13/23 2008                 Discharge Planning   DAVID: 11/6/2023     Code Status: Full Code   Is the patient medically ready for discharge?:     Reason for patient still in hospital (select all that apply): Treatment  Discharge Plan A: Home, Home Health                  JERO Centeno  Department of Hospital Medicine   [facility] - [facility] Surgical [facility]

## 2023-10-20 NOTE — PT/OT/SLP PROGRESS
Physical Therapy Treatment    Patient Name:  Joey King   MRN:  [id number]    Recommendations:     Discharge Recommendations: Moderate Intensity Therapy  Discharge Equipment Recommendations: none  Barriers to discharge: Decreased caregiver support    Assessment:     Joey King is a 83 y.o. male admitted with a medical diagnosis of Weakness.  He presents with the following impairments/functional limitations: weakness, impaired endurance, impaired self care skills, impaired functional mobility, impaired balance, decreased lower extremity function, decreased safety awareness Patient with improved ambulation on this visit.     Rehab Prognosis: Good; patient would benefit from acute skilled PT services to address these deficits and reach maximum level of function.    Recent Surgery: * No surgery found *      Plan:     During this hospitalization, patient to be seen 5 x/week to address the identified rehab impairments via gait training, therapeutic activities, therapeutic exercises, neuromuscular re-education and progress toward the following goals:    Plan of Care Expires:  11/05/23    Subjective     Chief Complaint: weakness  Patient/Family Comments/goals: return home   Pain/Comfort:  Pain Rating 1: 0/10  Pain Rating Post-Intervention 1: 0/10      Objective:     Communicated with PT prior to session.  Patient found supine with   upon PT entry to room.     General Precautions: Standard, fall  Orthopedic Precautions: N/A  Braces: N/A  Respiratory Status: Room air     Functional Mobility:  Bed Mobility:     Supine to Sit: contact guard assistance and minimum assistance  Transfers:     Sit to Stand:  contact guard assistance and minimum assistance with rolling walker  Bed to Chair: contact guard assistance with  rolling walker  using  Step Transfer  Gait: Patient ambulated 10' and 20' with RW, CGA, and no LOB noted.      AM-PAC 6 CLICK MOBILITY  Turning over in bed (including adjusting bedclothes, sheets  and blankets)?: 3  Sitting down on and standing up from a chair with arms (e.g., wheelchair, bedside commode, etc.): 3  Moving from lying on back to sitting on the side of the bed?: 3  Moving to and from a bed to a chair (including a wheelchair)?: 3  Need to walk in hospital room?: 3  Climbing 3-5 steps with a railing?: 1  Basic Mobility Total Score: 16       Treatment & Education:  Transfers and ambulation as listed above.      Patient left up in chair with call button in reach and daughter present..    GOALS:   Multidisciplinary Problems       Physical Therapy Goals          Problem: Physical Therapy    Goal Priority Disciplines Outcome Goal Variances Interventions   Physical Therapy Goal     PT, PT/OT Ongoing, Progressing     Description: Goals to be met by: 11/5/2023     Patient will increase functional independence with mobility by performing:    STG: Supine to sit with Stand-by Assistance  STG: Sit to supine with Stand-by Assistance  LTG: Sit to stand transfer with Modified [place]  LTG: Bed to chair transfer with Modified [place] using Rolling Walker  LTG: Gait  x 200 feet with Modified [place] using Rolling Walker.   LTG: Stand for 10 minutes with Modified [place] using Rolling Walker                         Time Tracking:     PT Received On: 10/20/23  PT Start Time: 1018     PT Stop Time: 1048  PT Total Time (min): 30 min     Billable Minutes: Gait Training 30    Treatment Type: Treatment  PT/PTA: PTA     Number of PTA visits since last PT visit: 2   JO Vann  10/20/2023

## 2023-10-20 NOTE — SUBJECTIVE & OBJECTIVE
Interval History: weakness    Review of Systems   Constitutional:  Positive for appetite change. Negative for fatigue and unexpected weight change.        Advanced to dysphagia diet this morning   Respiratory:  Negative for cough, chest tightness and shortness of breath.    Cardiovascular:  Negative for chest pain and palpitations.   Gastrointestinal:  Negative for abdominal pain, constipation, diarrhea, nausea and vomiting.        States last BM was 2 days ago   Genitourinary:  Negative for difficulty urinating and dysuria.   Musculoskeletal:  Negative for arthralgias.   Skin:  Negative for wound.   Neurological:  Positive for weakness. Negative for light-headedness and headaches.     Objective:     Vital Signs (Most Recent):  Temp: 97.6 °F (36.4 °C) (10/20/23 0712)  Pulse: (!) 59 (10/20/23 0712)  Resp: 20 (10/20/23 0712)  BP: 123/66 (10/20/23 0712)  SpO2: 96 % (10/20/23 0712) Vital Signs (24h Range):  Temp:  [97.6 °F (36.4 °C)-98.3 °F (36.8 °C)] 97.6 °F (36.4 °C)  Pulse:  [59-66] 59  Resp:  [18-20] 20  SpO2:  [96 %-99 %] 96 %  BP: (117-126)/(63-68) 123/66     Weight: 75.3 kg (166 lb)  Body mass index is 23.15 kg/m².    Intake/Output Summary (Last 24 hours) at 10/20/2023 0849  Last data filed at 10/20/2023 0229  Gross per 24 hour   Intake 1080 ml   Output 100 ml   Net 980 ml         Physical Exam  HENT:      Head: Normocephalic.      Mouth/Throat:      Mouth: Mucous membranes are moist.   Cardiovascular:      Rate and Rhythm: Rhythm irregular.      Pulses: Normal pulses.      Heart sounds: Normal heart sounds. No murmur heard.  Pulmonary:      Effort: Pulmonary effort is normal. No respiratory distress.      Breath sounds: Normal breath sounds. No stridor. No wheezing or rhonchi.   Abdominal:      General: Bowel sounds are normal. There is no distension.      Palpations: Abdomen is soft. There is no mass.      Tenderness: There is no abdominal tenderness. There is no guarding or rebound.      Hernia: No hernia is  present.   Musculoskeletal:         General: No swelling, tenderness, deformity or signs of injury. Normal range of motion.   Skin:     General: Skin is warm and dry.      Coloration: Skin is not jaundiced or pale.      Findings: No bruising or erythema.   Neurological:      Mental Status: He is alert and oriented to person, place, and time.   Psychiatric:         Mood and Affect: Mood normal.             Significant Labs: All pertinent labs within the past 24 hours have been reviewed.  Recent Lab Results       None            Significant Imaging: I have reviewed all pertinent imaging results/findings within the past 24 hours.

## 2023-10-20 NOTE — PLAN OF CARE
[facility]  Discharge Assessment    Primary Care Provider: Prashanth Ryan,      Discharge Assessment (most recent)       BRIEF DISCHARGE ASSESSMENT - 10/20/23 8840          Discharge Planning    Assessment Type Discharge Planning Brief Assessment (P)      Resource/Environmental Concerns none (P)      Support Systems Children (P)      Patient/Family Anticipates Transition to home with family (P)      Patient/Family Anticipated [facility] at [facility] (P)                    Mr. King and his daughter has agreed that he will go to the [facility] and stay with his daughter for a while and continue services with [facility].

## 2023-10-20 NOTE — PLAN OF CARE
Problem: Adult Inpatient Plan of Care  Goal: Plan of Care Review  Outcome: Ongoing, Progressing  Goal: Patient-Specific Goal (Individualized)  Outcome: Ongoing, Progressing  Goal: Absence of Hospital-Acquired Illness or Injury  Outcome: Ongoing, Progressing  Goal: Optimal Comfort and Wellbeing  Outcome: Ongoing, Progressing  Goal: Readiness for Transition of Care  Outcome: Ongoing, Progressing  Intervention: Mutually Develop Transition Plan  Flowsheets (Taken 10/20/2023 1150)  Transportation Anticipated: family or friend will provide     Problem: Impaired Wound Healing  Goal: Optimal Wound Healing  Outcome: Ongoing, Progressing  Intervention: Promote Wound Healing  Flowsheets (Taken 10/20/2023 1150)  Oral Nutrition Promotion: safe use of adaptive equipment encouraged  Sleep/Rest Enhancement: awakenings minimized     Problem: Skin Injury Risk Increased  Goal: Skin Health and Integrity  Outcome: Ongoing, Progressing  Intervention: Promote and Optimize Oral Intake  Flowsheets (Taken 10/20/2023 1150)  Oral Nutrition Promotion: safe use of adaptive equipment encouraged     Problem: Fall Injury Risk  Goal: Absence of Fall and Fall-Related Injury  Outcome: Ongoing, Progressing  Intervention: Identify and Manage Contributors  Flowsheets (Taken 10/20/2023 1150)  Self-Care Promotion: independence encouraged  Medication Review/Management: medications reviewed

## 2023-10-20 NOTE — ASSESSMENT & PLAN NOTE
10/13/23   continue flecanide  monitor on telemetry   eliquis on hold for recent gi bleed- can resume elquis in one week if no further bleeding    10/17/23     resume eliquis 5 mg po BID on 10/20 if no bleeding     10/20/23 eliquis 5 mg po BID resumed

## 2023-10-20 NOTE — PT/OT/SLP PROGRESS
Occupational Therapy   Treatment    Name: Joey King  MRN: [id number]  Admitting Diagnosis:  Weakness       Recommendations:     Discharge Recommendations: Moderate Intensity Therapy  Discharge Equipment Recommendations:  none  Barriers to discharge:  None    Assessment:     Joey King is a 83 y.o. male with a medical diagnosis of Weakness.  He presents with weakness and decline with ADLs. Performance deficits affecting function are weakness, impaired endurance, impaired self care skills, impaired functional mobility, impaired balance, decreased lower extremity function, decreased safety awareness.     Rehab Prognosis:  Fair; patient would benefit from acute skilled OT services to address these deficits and reach maximum level of function.       Plan:     Patient to be seen 5 x/week to address the above listed problems via therapeutic exercises  Plan of Care Expires:    Plan of Care Reviewed with: patient    Subjective     Chief Complaint: Pain and weakness  Patient/Family Comments/goals: to return to prior level of function  Pain/Comfort:       Objective:     Communicated with: Nurse prior to session.  Patient found supine with bed alarm upon OT entry to room.    General Precautions: Standard, fall    Orthopedic Precautions:N/A  Braces: N/A  Respiratory Status: Room air     Occupational Performance:     Bed Mobility:    Patient completed Rolling/Turning to Left with  minimum assistance  Patient completed Rolling/Turning to Right with minimum assistance  Patient completed Scooting/Bridging with minimum assistance  Patient completed Supine to Sit with minimum assistance  Patient completed Sit to Supine with minimum assistance     Functional Mobility/Transfers:  Not tested  Functional Mobility: n/a    Activities of Daily Living:  Not tested      [facility] 6 Click ADL:      Treatment & Education:  Pt performed B UE strengthening exercises to include:   Shoulder flexion   Chest press    Elbow  flexion   Elbow extension   Pectoral stretches   Bilateral rowing  All exercises performed 3x10 reps using 3# dowel and red theraband.  Patient performed standing activity x 5 min with 2 rest breaks due to fatigue.     Patient left supine with all lines intact and call button in reach    GOALS:   Multidisciplinary Problems       Occupational Therapy Goals          Problem: Occupational Therapy    Goal Priority Disciplines Outcome Interventions   Occupational Therapy Goal     OT, PT/OT Ongoing, Progressing    Description: Grooming Status:   Short Term Goal: Pt will perform grooming with SBA sitting EOB.   Long Term Goal: Pt will perform grooming/oral hygiene standing at sink with SBA      LE dressing Status:   Short Term Goal: Pt will perform LE dressing with SBA.   Long Term Goal: Pt will perform LE dressing with Mod I.    Toileting Status:   Short Term Goal: Pt will perform toilet hygiene on BSC with Mod I.  Long Term Goal: Pt will perform toilet hygiene on toilet with no AE with Mod I.    Commode Transfer:   Short Term Goal: Pt will perform BSC t/f with Mod I.  Long Term Goal:  Pt will perform toilet t/f in bathroom with Mod I.     Bathing Status:   Long Term Goal: Pt will perform sponge bath with Mod I with no unsafe fatigue.     Strength Status: 5/5 BUEs  Long Term Goal: Pt to perform BUE strengthening with weights and/or body weight to increase ADL independence and safety    Endurance Status:   Short Term Goal:pt to perform 15 min OT treatment with 5 or greater rest breaks  Long Term Goal: pt to perform 30 min OT treat with 3 or less rest breaks                          Time Tracking:     OT Date of Treatment: 10/20/23  OT Start Time: 1105  OT Stop Time: 1135  OT Total Time (min): 30 min    Billable Minutes:Therapeutic Exercise 20 min ; Therapeutic Activities 10 min    KAREN Varela/L, CSRS  OT/ALBA: OT          10/20/2023

## 2023-10-21 PROCEDURE — 27000982 HC MATTRESS, MATRIX LAL RENTAL

## 2023-10-21 PROCEDURE — 27000944

## 2023-10-21 PROCEDURE — 25000003 PHARM REV CODE 250: Performed by: NURSE PRACTITIONER

## 2023-10-21 PROCEDURE — 94761 N-INVAS EAR/PLS OXIMETRY MLT: CPT

## 2023-10-21 PROCEDURE — 11000004 HC SNF PRIVATE

## 2023-10-21 RX ADMIN — SENNOSIDES AND DOCUSATE SODIUM 1 TABLET: 50; 8.6 TABLET ORAL at 08:10

## 2023-10-21 RX ADMIN — POLYETHYLENE GLYCOL 3350 17 G: 17 POWDER, FOR SOLUTION ORAL at 08:10

## 2023-10-21 RX ADMIN — SUCRALFATE 1 G: 1 TABLET ORAL at 06:10

## 2023-10-21 RX ADMIN — SUCRALFATE 1 G: 1 TABLET ORAL at 04:10

## 2023-10-21 RX ADMIN — Medication 400 UNITS: at 08:10

## 2023-10-21 RX ADMIN — ASPIRIN 81 MG: 81 TABLET, COATED ORAL at 08:10

## 2023-10-21 RX ADMIN — PANTOPRAZOLE SODIUM 40 MG: 40 TABLET, DELAYED RELEASE ORAL at 08:10

## 2023-10-21 RX ADMIN — LEVOTHYROXINE SODIUM 100 MCG: 0.1 TABLET ORAL at 06:10

## 2023-10-21 RX ADMIN — APIXABAN 5 MG: 2.5 TABLET, FILM COATED ORAL at 08:10

## 2023-10-21 RX ADMIN — THERA TABS 1 TABLET: TAB at 08:10

## 2023-10-21 RX ADMIN — CLONIDINE HYDROCHLORIDE 0.1 MG: 0.1 TABLET ORAL at 04:10

## 2023-10-21 RX ADMIN — CHOLECALCIFEROL TAB 25 MCG (1000 UNIT) 1000 UNITS: 25 TAB at 08:10

## 2023-10-21 RX ADMIN — Medication 100 MG: at 08:10

## 2023-10-21 RX ADMIN — CYANOCOBALAMIN TAB 500 MCG 1000 MCG: 500 TAB at 08:10

## 2023-10-21 RX ADMIN — MELATONIN TAB 3 MG 6 MG: 3 TAB at 08:10

## 2023-10-21 RX ADMIN — CITALOPRAM HYDROBROMIDE 40 MG: 20 TABLET ORAL at 08:10

## 2023-10-21 RX ADMIN — SUCRALFATE 1 G: 1 TABLET ORAL at 11:10

## 2023-10-21 RX ADMIN — OXYBUTYNIN CHLORIDE 15 MG: 5 TABLET, EXTENDED RELEASE ORAL at 08:10

## 2023-10-21 RX ADMIN — OMEGA-3 FATTY ACIDS CAP 1000 MG 1 CAPSULE: 1000 CAP at 08:10

## 2023-10-21 RX ADMIN — SUCRALFATE 1 G: 1 TABLET ORAL at 08:10

## 2023-10-21 RX ADMIN — FLECAINIDE ACETATE 150 MG: 50 TABLET ORAL at 08:10

## 2023-10-21 RX ADMIN — ARIPIPRAZOLE 5 MG: 5 TABLET ORAL at 08:10

## 2023-10-21 NOTE — PLAN OF CARE
Problem: Adult Inpatient Plan of Care  Goal: Plan of Care Review  Outcome: Ongoing, Progressing  Goal: Patient-Specific Goal (Individualized)  Outcome: Ongoing, Progressing  Goal: Absence of Hospital-Acquired Illness or Injury  Outcome: Ongoing, Progressing  Goal: Optimal Comfort and Wellbeing  Outcome: Ongoing, Progressing  Goal: Readiness for Transition of Care  Outcome: Ongoing, Progressing  Intervention: Mutually Develop Transition Plan  Flowsheets (Taken 10/21/2023 1441)  Transportation Anticipated: family or friend will provide     Problem: Impaired Wound Healing  Goal: Optimal Wound Healing  Outcome: Ongoing, Progressing  Intervention: Promote Wound Healing  Flowsheets (Taken 10/21/2023 1441)  Oral Nutrition Promotion: safe use of adaptive equipment encouraged     Problem: Skin Injury Risk Increased  Goal: Skin Health and Integrity  Outcome: Ongoing, Progressing  Intervention: Promote and Optimize Oral Intake  Flowsheets (Taken 10/21/2023 1441)  Oral Nutrition Promotion: safe use of adaptive equipment encouraged     Problem: Fall Injury Risk  Goal: Absence of Fall and Fall-Related Injury  Outcome: Ongoing, Progressing  Intervention: Identify and Manage Contributors  Flowsheets (Taken 10/21/2023 1441)  Self-Care Promotion: independence encouraged  Medication Review/Management: medications reviewed

## 2023-10-21 NOTE — PLAN OF CARE
Problem: Adult Inpatient Plan of Care  Goal: Optimal Comfort and Wellbeing  Outcome: Ongoing, Progressing     Problem: Skin Injury Risk Increased  Goal: Skin Health and Integrity  Outcome: Ongoing, Progressing     Problem: Fall Injury Risk  Goal: Absence of Fall and Fall-Related Injury  Outcome: Ongoing, Progressing

## 2023-10-22 PROCEDURE — 25000003 PHARM REV CODE 250: Performed by: NURSE PRACTITIONER

## 2023-10-22 PROCEDURE — 11000004 HC SNF PRIVATE

## 2023-10-22 PROCEDURE — A6212 FOAM DRG <=16 SQ IN W/BORDER: HCPCS

## 2023-10-22 PROCEDURE — 94761 N-INVAS EAR/PLS OXIMETRY MLT: CPT

## 2023-10-22 RX ADMIN — CLONIDINE HYDROCHLORIDE 0.1 MG: 0.1 TABLET ORAL at 04:10

## 2023-10-22 RX ADMIN — SENNOSIDES AND DOCUSATE SODIUM 1 TABLET: 50; 8.6 TABLET ORAL at 09:10

## 2023-10-22 RX ADMIN — LEVOTHYROXINE SODIUM 100 MCG: 0.1 TABLET ORAL at 05:10

## 2023-10-22 RX ADMIN — FLECAINIDE ACETATE 150 MG: 50 TABLET ORAL at 09:10

## 2023-10-22 RX ADMIN — SUCRALFATE 1 G: 1 TABLET ORAL at 11:10

## 2023-10-22 RX ADMIN — SUCRALFATE 1 G: 1 TABLET ORAL at 09:10

## 2023-10-22 RX ADMIN — SUCRALFATE 1 G: 1 TABLET ORAL at 04:10

## 2023-10-22 RX ADMIN — Medication 400 UNITS: at 09:10

## 2023-10-22 RX ADMIN — APIXABAN 5 MG: 2.5 TABLET, FILM COATED ORAL at 09:10

## 2023-10-22 RX ADMIN — ASPIRIN 81 MG: 81 TABLET, COATED ORAL at 09:10

## 2023-10-22 RX ADMIN — CITALOPRAM HYDROBROMIDE 40 MG: 20 TABLET ORAL at 09:10

## 2023-10-22 RX ADMIN — POLYETHYLENE GLYCOL 3350 17 G: 17 POWDER, FOR SOLUTION ORAL at 09:10

## 2023-10-22 RX ADMIN — PANTOPRAZOLE SODIUM 40 MG: 40 TABLET, DELAYED RELEASE ORAL at 09:10

## 2023-10-22 RX ADMIN — SUCRALFATE 1 G: 1 TABLET ORAL at 05:10

## 2023-10-22 RX ADMIN — ARIPIPRAZOLE 5 MG: 5 TABLET ORAL at 09:10

## 2023-10-22 RX ADMIN — OXYBUTYNIN CHLORIDE 15 MG: 5 TABLET, EXTENDED RELEASE ORAL at 09:10

## 2023-10-22 NOTE — PLAN OF CARE
Problem: Fall Injury Risk  Goal: Absence of Fall and Fall-Related Injury  Outcome: Ongoing, Progressing  Intervention: Identify and Manage Contributors  Flowsheets (Taken 10/22/2023 1847)  Self-Care Promotion:   independence encouraged   BADL personal objects within reach   BADL personal routines maintained   meal set-up provided   safe use of adaptive equipment encouraged  Intervention: Promote Injury-Free Environment  Flowsheets (Taken 10/22/2023 1847)  Safety Promotion/Fall Prevention:   assistive device/personal item within reach   bed alarm set   diversional activities provided   Fall Risk reviewed with patient/family   Fall Risk signage in place   nonskid shoes/socks when out of bed   side rails raised x 3   instructed to call staff for mobility   room near unit station

## 2023-10-22 NOTE — PLAN OF CARE
Problem: Adult Inpatient Plan of Care  Goal: Plan of Care Review  Outcome: Ongoing, Progressing  Flowsheets (Taken 10/21/2023 2009)  Plan of Care Reviewed With: patient  Goal: Patient-Specific Goal (Individualized)  Outcome: Ongoing, Progressing  Flowsheets (Taken 10/21/2023 2009)  Anxieties, Fears or Concerns: none  Individualized Care Needs: To gain increased strength within the next week to be able to return home with daughter

## 2023-10-23 LAB
ANION GAP SERPL CALCULATED.3IONS-SCNC: 9 MMOL/L (ref 7–16)
BASOPHILS # BLD AUTO: 0.03 K/UL (ref 0–0.2)
BASOPHILS NFR BLD AUTO: 0.6 % (ref 0–1)
BUN SERPL-MCNC: 11 MG/DL (ref 7–18)
BUN/CREAT SERPL: 12 (ref 6–20)
CALCIUM SERPL-MCNC: 8.7 MG/DL (ref 8.5–10.1)
CHLORIDE SERPL-SCNC: 102 MMOL/L (ref 98–107)
CO2 SERPL-SCNC: 29 MMOL/L (ref 21–32)
CREAT SERPL-MCNC: 0.92 MG/DL (ref 0.7–1.3)
DIFFERENTIAL METHOD BLD: ABNORMAL
EGFR (NO RACE VARIABLE) (RUSH/TITUS): 82 ML/MIN/1.73M2
EOSINOPHIL # BLD AUTO: 0.11 K/UL (ref 0–0.5)
EOSINOPHIL NFR BLD AUTO: 2.3 % (ref 1–4)
ERYTHROCYTE [DISTWIDTH] IN BLOOD BY AUTOMATED COUNT: 14.9 % (ref 11.5–14.5)
GLUCOSE SERPL-MCNC: 104 MG/DL (ref 74–106)
HCT VFR BLD AUTO: 32.2 % (ref 40–54)
HGB BLD-MCNC: 10.3 G/DL (ref 13.5–18)
LYMPHOCYTES # BLD AUTO: 1.17 K/UL (ref 1–4.8)
LYMPHOCYTES NFR BLD AUTO: 24.7 % (ref 27–41)
MAGNESIUM SERPL-MCNC: 1.8 MG/DL (ref 1.7–2.3)
MCH RBC QN AUTO: 30.7 PG (ref 27–31)
MCHC RBC AUTO-ENTMCNC: 32 G/DL (ref 32–36)
MCV RBC AUTO: 96.1 FL (ref 80–96)
MONOCYTES # BLD AUTO: 0.67 K/UL (ref 0–0.8)
MONOCYTES NFR BLD AUTO: 14.2 % (ref 2–6)
MPC BLD CALC-MCNC: 8.5 FL (ref 9.4–12.4)
NEUTROPHILS # BLD AUTO: 2.75 K/UL (ref 1.8–7.7)
NEUTROPHILS NFR BLD AUTO: 58.2 % (ref 53–65)
PHOSPHATE SERPL-MCNC: 2.9 MG/DL (ref 2.5–4.5)
PLATELET # BLD AUTO: 237 K/UL (ref 150–400)
POTASSIUM SERPL-SCNC: 3.6 MMOL/L (ref 3.5–5.1)
RBC # BLD AUTO: 3.35 M/UL (ref 4.6–6.2)
SODIUM SERPL-SCNC: 136 MMOL/L (ref 136–145)
WBC # BLD AUTO: 4.73 K/UL (ref 4.5–11)

## 2023-10-23 PROCEDURE — 97116 GAIT TRAINING THERAPY: CPT | Mod: CQ

## 2023-10-23 PROCEDURE — 84100 ASSAY OF PHOSPHORUS: CPT | Performed by: NURSE PRACTITIONER

## 2023-10-23 PROCEDURE — 94761 N-INVAS EAR/PLS OXIMETRY MLT: CPT

## 2023-10-23 PROCEDURE — 83735 ASSAY OF MAGNESIUM: CPT | Performed by: NURSE PRACTITIONER

## 2023-10-23 PROCEDURE — 97110 THERAPEUTIC EXERCISES: CPT | Mod: CQ

## 2023-10-23 PROCEDURE — 97530 THERAPEUTIC ACTIVITIES: CPT

## 2023-10-23 PROCEDURE — 85025 COMPLETE CBC W/AUTO DIFF WBC: CPT | Performed by: NURSE PRACTITIONER

## 2023-10-23 PROCEDURE — 97110 THERAPEUTIC EXERCISES: CPT

## 2023-10-23 PROCEDURE — 80048 BASIC METABOLIC PNL TOTAL CA: CPT | Performed by: NURSE PRACTITIONER

## 2023-10-23 PROCEDURE — 11000004 HC SNF PRIVATE

## 2023-10-23 PROCEDURE — 27000944

## 2023-10-23 PROCEDURE — 25000003 PHARM REV CODE 250: Performed by: NURSE PRACTITIONER

## 2023-10-23 PROCEDURE — 27000982 HC MATTRESS, MATRIX LAL RENTAL

## 2023-10-23 RX ADMIN — APIXABAN 5 MG: 2.5 TABLET, FILM COATED ORAL at 09:10

## 2023-10-23 RX ADMIN — SUCRALFATE 1 G: 1 TABLET ORAL at 12:10

## 2023-10-23 RX ADMIN — PANTOPRAZOLE SODIUM 40 MG: 40 TABLET, DELAYED RELEASE ORAL at 08:10

## 2023-10-23 RX ADMIN — SUCRALFATE 1 G: 1 TABLET ORAL at 05:10

## 2023-10-23 RX ADMIN — APIXABAN 5 MG: 2.5 TABLET, FILM COATED ORAL at 08:10

## 2023-10-23 RX ADMIN — THERA TABS 1 TABLET: TAB at 09:10

## 2023-10-23 RX ADMIN — CLONIDINE HYDROCHLORIDE 0.1 MG: 0.1 TABLET ORAL at 05:10

## 2023-10-23 RX ADMIN — SUCRALFATE 1 G: 1 TABLET ORAL at 09:10

## 2023-10-23 RX ADMIN — LEVOTHYROXINE SODIUM 100 MCG: 0.1 TABLET ORAL at 06:10

## 2023-10-23 RX ADMIN — OXYBUTYNIN CHLORIDE 15 MG: 5 TABLET, EXTENDED RELEASE ORAL at 08:10

## 2023-10-23 RX ADMIN — CHOLECALCIFEROL TAB 25 MCG (1000 UNIT) 1000 UNITS: 25 TAB at 09:10

## 2023-10-23 RX ADMIN — ASPIRIN 81 MG: 81 TABLET, COATED ORAL at 09:10

## 2023-10-23 RX ADMIN — Medication 100 MG: at 08:10

## 2023-10-23 RX ADMIN — MELATONIN TAB 3 MG 6 MG: 3 TAB at 09:10

## 2023-10-23 RX ADMIN — Medication 400 UNITS: at 08:10

## 2023-10-23 RX ADMIN — FLECAINIDE ACETATE 150 MG: 50 TABLET ORAL at 08:10

## 2023-10-23 RX ADMIN — SUCRALFATE 1 G: 1 TABLET ORAL at 06:10

## 2023-10-23 RX ADMIN — CYANOCOBALAMIN TAB 500 MCG 1000 MCG: 500 TAB at 08:10

## 2023-10-23 RX ADMIN — FLECAINIDE ACETATE 150 MG: 50 TABLET ORAL at 09:10

## 2023-10-23 RX ADMIN — SENNOSIDES AND DOCUSATE SODIUM 1 TABLET: 50; 8.6 TABLET ORAL at 08:10

## 2023-10-23 RX ADMIN — SENNOSIDES AND DOCUSATE SODIUM 1 TABLET: 50; 8.6 TABLET ORAL at 09:10

## 2023-10-23 RX ADMIN — CITALOPRAM HYDROBROMIDE 40 MG: 20 TABLET ORAL at 08:10

## 2023-10-23 RX ADMIN — OMEGA-3 FATTY ACIDS CAP 1000 MG 1 CAPSULE: 1000 CAP at 08:10

## 2023-10-23 RX ADMIN — ARIPIPRAZOLE 5 MG: 5 TABLET ORAL at 08:10

## 2023-10-23 NOTE — PT/OT/SLP PROGRESS
Physical Therapy Treatment    Patient Name:  Joey King   MRN:  [id number]    Recommendations:     Discharge Recommendations: Moderate Intensity Therapy  Discharge Equipment Recommendations: none  Barriers to discharge: Inaccessible home and Decreased caregiver support    Assessment:     Joey King is a 83 y.o. male admitted with a medical diagnosis of Weakness.  He presents with the following impairments/functional limitations: weakness, impaired endurance, impaired self care skills, impaired functional mobility, impaired balance, decreased lower extremity function, decreased safety awareness. Pt. With good participation/ motivation with PT tx.     Rehab Prognosis: Good and Fair; patient would benefit from acute skilled PT services to address these deficits and reach maximum level of function.    Recent Surgery: * No surgery found *      Plan:     During this hospitalization, patient to be seen 5 x/week to address the identified rehab impairments via gait training, therapeutic activities, therapeutic exercises, neuromuscular re-education and progress toward the following goals:    Plan of Care Expires:  11/05/23    Subjective     Chief Complaint: weakness  Patient/Family Comments/goals: Pt. With no current complaints,denies any pain, agrees to PT.   Pain/Comfort:  Pain Rating 1: 0/10  Pain Rating Post-Intervention 1: 0/10      Objective:     Communicated with Flavio Aguilar, PT for POC, Rae Valencia, OT and nursing staff  prior to session.  Patient found up in chair with bed alarm upon PT entry to room.     General Precautions: Standard, fall  Orthopedic Precautions: N/A  Braces: N/A  Respiratory Status: Room air     Functional Mobility:  Transfers: Sit to Stand: Min A with RW  Gait x 18 feet with RW and MIN ; followed with chair due to pt. Quick to fatigue.       AM-PAC 6 CLICK MOBILITY  Turning over in bed (including adjusting bedclothes, sheets and blankets)?: 3  Sitting down on and  standing up from a chair with arms (e.g., wheelchair, bedside commode, etc.): 3  Moving from lying on back to sitting on the side of the bed?: 3  Moving to and from a bed to a chair (including a wheelchair)?: 3  Need to walk in hospital room?: 3  Climbing 3-5 steps with a railing?: 1  Basic Mobility Total Score: 16       Treatment & Education:  Pt. Participated in mobility per above and Seated: AP, Hip ER. IR, Heel slides andf Hip ABD/ ADD 2 x 10 reps each and Seated: LAQS x 20 reps each bilateral.     Patient left up in chair with all lines intact, call button in reach, and nursing notified..    GOALS:   Multidisciplinary Problems       Physical Therapy Goals          Problem: Physical Therapy    Goal Priority Disciplines Outcome Goal Variances Interventions   Physical Therapy Goal     PT, PT/OT Ongoing, Progressing     Description: Goals to be met by: 11/5/2023     Patient will increase functional independence with mobility by performing:    STG: Supine to sit with Stand-by Assistance  STG: Sit to supine with Stand-by Assistance  LTG: Sit to stand transfer with Modified [place]  LTG: Bed to chair transfer with Modified [place] using Rolling Walker  LTG: Gait  x 200 feet with Modified [place] using Rolling Walker.   LTG: Stand for 10 minutes with Modified [place] using Rolling Walker                         Time Tracking:     PT Received On: 10/23/23  PT Start Time: 1020     PT Stop Time: 1050  PT Total Time (min): 30 min     Billable Minutes: Gait Training 15 and Therapeutic Exercise 15    Treatment Type: Treatment  PT/PTA: PTA     Number of PTA visits since last PT visit: 3     10/23/2023

## 2023-10-23 NOTE — PT/OT/SLP PROGRESS
Occupational Therapy   Treatment    Name: Joey King  MRN: [id number]  Admitting Diagnosis:  Weakness       Recommendations:     Discharge Recommendations: Moderate Intensity Therapy  Discharge Equipment Recommendations:  none  Barriers to discharge:  None    Assessment:     Joey King is a 83 y.o. male with a medical diagnosis of Weakness.  He presents with weakness and decline with ADLs. Performance deficits affecting function are weakness, impaired endurance, impaired self care skills, impaired functional mobility, impaired balance, decreased lower extremity function, decreased safety awareness.     Rehab Prognosis:  Fair; patient would benefit from acute skilled OT services to address these deficits and reach maximum level of function.       Plan:     Patient to be seen 5 x/week to address the above listed problems via therapeutic exercises  Plan of Care Expires:    Plan of Care Reviewed with: patient    Subjective     Chief Complaint: Pain and weakness  Patient/Family Comments/goals: to return to prior level of function  Pain/Comfort:       Objective:     Communicated with: Nurse prior to session.  Patient found supine with bed alarm upon OT entry to room.    General Precautions: Standard, fall    Orthopedic Precautions:N/A  Braces: N/A  Respiratory Status: Room air     Occupational Performance:     Bed Mobility:    Patient completed Rolling/Turning to Left with  minimum assistance  Patient completed Rolling/Turning to Right with minimum assistance  Patient completed Scooting/Bridging with minimum assistance  Patient completed Supine to Sit with minimum assistance  Patient completed Sit to Supine with minimum assistance     Functional Mobility/Transfers:  Not tested  Functional Mobility: n/a    Activities of Daily Living:  Not tested      [facility] 6 Click ADL:      Treatment & Education:  Pt performed B UE strengthening exercises to include:   Shoulder flexion   Chest press    Elbow  flexion   Elbow extension   Pectoral stretches   Bilateral rowing  All exercises performed 3x10 reps using 3# dowel and red theraband.  Patient performed standing activity x 5 min with 2 rest breaks due to fatigue.     Patient left supine with all lines intact and call button in reach    GOALS:   Multidisciplinary Problems       Occupational Therapy Goals          Problem: Occupational Therapy    Goal Priority Disciplines Outcome Interventions   Occupational Therapy Goal     OT, PT/OT Ongoing, Progressing    Description: Grooming Status:   Short Term Goal: Pt will perform grooming with SBA sitting EOB.   Long Term Goal: Pt will perform grooming/oral hygiene standing at sink with SBA      LE dressing Status:   Short Term Goal: Pt will perform LE dressing with SBA.   Long Term Goal: Pt will perform LE dressing with Mod I.    Toileting Status:   Short Term Goal: Pt will perform toilet hygiene on BSC with Mod I.  Long Term Goal: Pt will perform toilet hygiene on toilet with no AE with Mod I.    Commode Transfer:   Short Term Goal: Pt will perform BSC t/f with Mod I.  Long Term Goal:  Pt will perform toilet t/f in bathroom with Mod I.     Bathing Status:   Long Term Goal: Pt will perform sponge bath with Mod I with no unsafe fatigue.     Strength Status: 5/5 BUEs  Long Term Goal: Pt to perform BUE strengthening with weights and/or body weight to increase ADL independence and safety    Endurance Status:   Short Term Goal:pt to perform 15 min OT treatment with 5 or greater rest breaks  Long Term Goal: pt to perform 30 min OT treat with 3 or less rest breaks                          Time Tracking:     OT Date of Treatment: 10/23/23  OT Start Time: 0916  OT Stop Time: 0946  OT Total Time (min): 30 min    Billable Minutes:Therapeutic Exercise 20 min ; Therapeutic Activities 10 min    KAREN Varela/L, CSRS  OT/ALBA: OT          10/23/2023

## 2023-10-23 NOTE — NURSING
Patient resting in bed in no distress. Denies any pain or needs at this time.  Call light near and siderails up x3.

## 2023-10-24 PROCEDURE — 25000003 PHARM REV CODE 250: Performed by: NURSE PRACTITIONER

## 2023-10-24 PROCEDURE — 97110 THERAPEUTIC EXERCISES: CPT | Mod: CQ

## 2023-10-24 PROCEDURE — 99308 SBSQ NF CARE LOW MDM 20: CPT | Mod: ,,, | Performed by: FAMILY MEDICINE

## 2023-10-24 PROCEDURE — 97116 GAIT TRAINING THERAPY: CPT | Mod: CQ

## 2023-10-24 PROCEDURE — 99308 PR NURSING FAC CARE, SUBSEQ, MINOR COMPLIC: ICD-10-PCS | Mod: ,,, | Performed by: FAMILY MEDICINE

## 2023-10-24 PROCEDURE — 97530 THERAPEUTIC ACTIVITIES: CPT | Mod: CQ

## 2023-10-24 PROCEDURE — 11000004 HC SNF PRIVATE

## 2023-10-24 PROCEDURE — 94761 N-INVAS EAR/PLS OXIMETRY MLT: CPT

## 2023-10-24 RX ADMIN — CHOLECALCIFEROL TAB 25 MCG (1000 UNIT) 1000 UNITS: 25 TAB at 09:10

## 2023-10-24 RX ADMIN — SUCRALFATE 1 G: 1 TABLET ORAL at 08:10

## 2023-10-24 RX ADMIN — OXYBUTYNIN CHLORIDE 15 MG: 5 TABLET, EXTENDED RELEASE ORAL at 09:10

## 2023-10-24 RX ADMIN — PANTOPRAZOLE SODIUM 40 MG: 40 TABLET, DELAYED RELEASE ORAL at 09:10

## 2023-10-24 RX ADMIN — APIXABAN 5 MG: 2.5 TABLET, FILM COATED ORAL at 09:10

## 2023-10-24 RX ADMIN — SENNOSIDES AND DOCUSATE SODIUM 1 TABLET: 50; 8.6 TABLET ORAL at 09:10

## 2023-10-24 RX ADMIN — LEVOTHYROXINE SODIUM 100 MCG: 0.1 TABLET ORAL at 06:10

## 2023-10-24 RX ADMIN — SENNOSIDES AND DOCUSATE SODIUM 1 TABLET: 50; 8.6 TABLET ORAL at 08:10

## 2023-10-24 RX ADMIN — Medication 400 UNITS: at 09:10

## 2023-10-24 RX ADMIN — THERA TABS 1 TABLET: TAB at 09:10

## 2023-10-24 RX ADMIN — CITALOPRAM HYDROBROMIDE 40 MG: 20 TABLET ORAL at 09:10

## 2023-10-24 RX ADMIN — Medication 100 MG: at 09:10

## 2023-10-24 RX ADMIN — SUCRALFATE 1 G: 1 TABLET ORAL at 06:10

## 2023-10-24 RX ADMIN — POLYETHYLENE GLYCOL 3350 17 G: 17 POWDER, FOR SOLUTION ORAL at 09:10

## 2023-10-24 RX ADMIN — SUCRALFATE 1 G: 1 TABLET ORAL at 04:10

## 2023-10-24 RX ADMIN — FLECAINIDE ACETATE 150 MG: 50 TABLET ORAL at 08:10

## 2023-10-24 RX ADMIN — FLECAINIDE ACETATE 150 MG: 50 TABLET ORAL at 09:10

## 2023-10-24 RX ADMIN — APIXABAN 5 MG: 2.5 TABLET, FILM COATED ORAL at 08:10

## 2023-10-24 RX ADMIN — ARIPIPRAZOLE 5 MG: 5 TABLET ORAL at 09:10

## 2023-10-24 RX ADMIN — CYANOCOBALAMIN TAB 500 MCG 1000 MCG: 500 TAB at 09:10

## 2023-10-24 RX ADMIN — SUCRALFATE 1 G: 1 TABLET ORAL at 11:10

## 2023-10-24 RX ADMIN — ASPIRIN 81 MG: 81 TABLET, COATED ORAL at 08:10

## 2023-10-24 RX ADMIN — CLONIDINE HYDROCHLORIDE 0.1 MG: 0.1 TABLET ORAL at 04:10

## 2023-10-24 RX ADMIN — OMEGA-3 FATTY ACIDS CAP 1000 MG 1 CAPSULE: 1000 CAP at 09:10

## 2023-10-24 NOTE — PROGRESS NOTES
[facility] - Medical Surgical Unit  Hospital Medicine  Progress Note    Patient Name: Joey King  MRN: [id number]  Patient Class: IP- Swing   Admission Date: 10/13/2023  Length of Stay: 11 days  Attending Physician: Len Palacios IV, DO  Primary Care Provider: Prashanth Ryan DO        Subjective:     Principal Problem:Weakness        HPI:  Mr. Joey King is an 83 year old male with pmh of htn, afib, CHF,anxiety,depression,PE in Feb.2023, EGRD, hypothyroidism,cholecystectomy 09/23/23.He is s/p an observation stay at [facility]. EGD shows Taty Colon Tear . Had 2 hemostatic clips applied. He had no further bleeding. Records show H&H is 9/27. He has been accepted back to swing bed at [facility].     Mr. King was admitted to [facility] on 10/13/2023 for continued therapy.           Overview/Hospital Course:  Mr. King is 83 year old white male who was admitted [facility] for swing bed services for therapy.  He was received from [facility] in [place], following an admission for UGIB secondary to Taty-Colon tear.  He was seen by GI at [facility] who performed EGD to confirm this diagnosis.  GI placed hemostatic clips for the bleed.  Pt is on long term use of Eliquis due to history of Afib and PE.  GI recommended holding his Eliquis for an additional week.  At that time, if there are no signs of GIB, it can be restarted.  He has no complaints voiced tonight.     0620: I was called by nursing staff after the patient had fallen.  He reports he was going to the bathroom, but was too weak.  He denies hitting his head and denies LOC.  Nursing staff reports they found him on the floor at the foot of the bed.  Pt is alert and oriented x3.  He denies pain.  FROM without pain to all 4 extremities.  Denies neck/back pain.  No obvious signs of soft tissue injury.      10/17/23 Awake and resting in bed without complaints. Daughter at bedside. Denies any bleeding. Continue with PT  and OT .     10/18/23 (00:20)  Nursing staff report one episode of vomiting--patient c/o nausea earlier tonight and was given Zofran ODT. One episode of bilious emesis reported. Will start saline lock and administer protonix and promethazine.     10/18/23 (0450) Elevated BP Reported. Continues to feel nauseated at times. Denies headache, chest pain, diaphoresis, or dizziness. Advised to administer Hydralazine 5mg IVP and monitor BP--will titrate med as needed.     10/18/23 Awake and resting in bed. Reports he started vomiting around midnight. Denies having any dark coffee ground emesis or any signs of blood. Denies abdominal pain. Denies chest pain. Does have some belching. Complains of continued nausea. Will give zofran IV  and monitor. States he did not sleep any last night.    0854 no further nausea. States he is going to try and take a nap.    0932 complains of abd and chest pain at level 8. No further nausea. Stat troponin and EKG ordered. He has had some belching this morning. Will also check KUB and give some simethicone.  Troponin is 12.9. EKG ST at 105, nonspecific ST and T wave abn.    1200 Had norco @ 1119. States pain is about a 4-5. Reports passing some gas. Repeat troponin pending.   Vomitus sent for occult blood.    1253 troponin 14- continues to have nausea. Will make npo, give fluids and treat nausea.    10/19/23 no further nausea or vomiting. Deneis chest pain. Has tolerated CL diet this morning. Will slowly advance diet as tolerated.    10/20/23 tolerating full liquids. Increased to dysphagia diet. No further reports of nausea or vomiting . No signs of bleeding . Will resume eliquis BID.    10/24/2023-patient seen this morning lying in bed with his eyes closed.  Patient is easily arousable and states that he is feeling much better.  Patient's nausea has passed at this time.  He is still on a dysphagia diet and I do not intend to change that day.  Review of most recent laboratory evaluation shows  grossly within lab reference ranges or at baseline for patient.  We will continue to monitor and treat as appropriate.  We will continue to evaluate for discharge planning.      Interval History:  No acute events overnight.    Review of Systems   All other systems reviewed and are negative.    Objective:     Vital Signs (Most Recent):  Temp: 97.9 °F (36.6 °C) (10/24/23 0730)  Pulse: (!) 56 (10/24/23 0730)  Resp: 18 (10/24/23 0730)  BP: (!) 126/58 (10/24/23 0730)  SpO2: 96 % (10/24/23 0730) Vital Signs (24h Range):  Temp:  [97.3 °F (36.3 °C)-98.2 °F (36.8 °C)] 97.9 °F (36.6 °C)  Pulse:  [56-75] 56  Resp:  [18-20] 18  SpO2:  [96 %-98 %] 96 %  BP: (121-141)/(58-66) 126/58     Weight: 75.3 kg (166 lb)  Body mass index is 23.15 kg/m².    Intake/Output Summary (Last 24 hours) at 10/24/2023 0938  Last data filed at 10/24/2023 0800  Gross per 24 hour   Intake 840 ml   Output 1 ml   Net 839 ml         Physical Exam  Vitals reviewed.   Constitutional:       General: He is not in acute distress.     Appearance: He is not ill-appearing or toxic-appearing.   HENT:      Head: Normocephalic and atraumatic.      Nose: Nose normal.   Eyes:      General: No scleral icterus.     Extraocular Movements: Extraocular movements intact.   Pulmonary:      Effort: Pulmonary effort is normal.   Musculoskeletal:      Cervical back: No rigidity or tenderness.   Skin:     Findings: No rash.             Significant Labs: All pertinent labs within the past 24 hours have been reviewed.  BMP:   Recent Labs   Lab 10/23/23  0345         K 3.6      CO2 29   BUN 11   CREATININE 0.92   CALCIUM 8.7   MG 1.8     CBC:   Recent Labs   Lab 10/23/23  0345   WBC 4.73   HGB 10.3*   HCT 32.2*          Significant Imaging: I have reviewed all pertinent imaging results/findings within the past 24 hours.      Assessment/Plan:      * Weakness  10/13/23 PT and OT to evaluate and treat      10/16/23 PT and OT to evaluate and treat    10/17/23  continue PT and OT    H/O: GI bleed    10/13/23 records from [facility] show recent GI bleed - had EGD that [id number] mm bleeding  Taty Colon tear- had 2 hemastatic clips applied. No further bleeding   H&H stable 9/27   GI recommends holding eliquis for another week- resume then if no further bleeding     10/16/23 h&h 10.2/31.3    10/17/23 denies any bleeding - resume eliquis 10/20 if no further bleeding     10/19/23 stable , H&H is 11.1/34.5    Chronic diastolic CHF (congestive heart failure)  10/13/23   Daily weights  Intake and Output      10/16/23 stable    10/20/23 stable    Depression  10/13/23 continue celexa 40 mg po daily  Continue abilify 5 mg po daily        Paroxysmal atrial fibrillation  10/13/23   continue flecanide  monitor on telemetry   eliquis on hold for recent gi bleed- can resume elquis in one week if no further bleeding    10/17/23     resume eliquis 5 mg po BID on 10/20 if no bleeding     10/20/23 eliquis 5 mg po BID resumed     Hypothyroidism    10/13/23 continue levothyroxine   100 mcg po daily    Gastroesophageal reflux disease without esophagitis  10/13/23 continue protonix 40 mg podaily      10/20/23 dysphagia diet     Essential hypertension  10/13/23 continue clonidone 0.1 mg po daily      10/16/23 stable 140/70    10/20 stable      VTE Risk Mitigation (From admission, onward)         Ordered     apixaban tablet 5 mg  2 times daily         10/20/23 0853     IP VTE LOW RISK PATIENT  Once         10/13/23 2008     Place ULISES hose  Until discontinued         10/13/23 2008                Discharge Planning   DAVID: 11/6/2023     Code Status: Full Code   Is the patient medically ready for discharge?:     Reason for patient still in hospital (select all that apply): Patient trending condition, Treatment and Pending disposition  Discharge Plan A: Home, Home Health                  Len Palacios IV, DO  Department of Hospital Medicine   [facility] - Medical Surgical Unit

## 2023-10-24 NOTE — PT/OT/SLP PROGRESS
Occupational Therapy      Patient Name:  Joey King   MRN:  [id number]    Patient not seen today secondary to Patient fatigue (Patient states he is too tired from just having therapy. Will reattempt as time permits.). Will follow-up 10/25/2023.      Rae Valencia, PAIGER/L, CSRS  10/24/2023

## 2023-10-24 NOTE — SUBJECTIVE & OBJECTIVE
Interval History:  No acute events overnight.    Review of Systems   All other systems reviewed and are negative.    Objective:     Vital Signs (Most Recent):  Temp: 97.9 °F (36.6 °C) (10/24/23 0730)  Pulse: (!) 56 (10/24/23 0730)  Resp: 18 (10/24/23 0730)  BP: (!) 126/58 (10/24/23 0730)  SpO2: 96 % (10/24/23 0730) Vital Signs (24h Range):  Temp:  [97.3 °F (36.3 °C)-98.2 °F (36.8 °C)] 97.9 °F (36.6 °C)  Pulse:  [56-75] 56  Resp:  [18-20] 18  SpO2:  [96 %-98 %] 96 %  BP: (121-141)/(58-66) 126/58     Weight: 75.3 kg (166 lb)  Body mass index is 23.15 kg/m².    Intake/Output Summary (Last 24 hours) at 10/24/2023 0938  Last data filed at 10/24/2023 0800  Gross per 24 hour   Intake 840 ml   Output 1 ml   Net 839 ml         Physical Exam  Vitals reviewed.   Constitutional:       General: He is not in acute distress.     Appearance: He is not ill-appearing or toxic-appearing.   HENT:      Head: Normocephalic and atraumatic.      Nose: Nose normal.   Eyes:      General: No scleral icterus.     Extraocular Movements: Extraocular movements intact.   Pulmonary:      Effort: Pulmonary effort is normal.   Musculoskeletal:      Cervical back: No rigidity or tenderness.   Skin:     Findings: No rash.             Significant Labs: All pertinent labs within the past 24 hours have been reviewed.  BMP:   Recent Labs   Lab 10/23/23  0345         K 3.6      CO2 29   BUN 11   CREATININE 0.92   CALCIUM 8.7   MG 1.8     CBC:   Recent Labs   Lab 10/23/23  0345   WBC 4.73   HGB 10.3*   HCT 32.2*          Significant Imaging: I have reviewed all pertinent imaging results/findings within the past 24 hours.

## 2023-10-24 NOTE — PT/OT/SLP PROGRESS
Physical Therapy Treatment    Patient Name:  Joey King   MRN:  [id number]    Recommendations:     Discharge Recommendations: Moderate Intensity Therapy  Discharge Equipment Recommendations: none  Barriers to discharge: Inaccessible home and Decreased caregiver support, weakness    Assessment:     Joye King is a 83 y.o. male admitted with a medical diagnosis of Weakness.  He presents with the following impairments/functional limitations: weakness, impaired endurance, impaired self care skills, impaired balance, decreased lower extremity function, decreased safety awareness. Pt. Agreeable to PT, Displayed improved tolerance to OOB activity this date.     Rehab Prognosis: Good; patient would benefit from acute skilled PT services to address these deficits and reach maximum level of function.    Recent Surgery: * No surgery found *      Plan:     During this hospitalization, patient to be seen 5 x/week to address the identified rehab impairments via gait training, therapeutic activities, therapeutic exercises, neuromuscular re-education and progress toward the following goals:    Plan of Care Expires:  11/05/23    Subjective     Chief Complaint: Weakness  Patient/Family Comments/goals: Pt. Agrees to PT tx, denies any pain, states he will sit up in chair for lunch.   Pain/Comfort:  Pain Rating Post-Intervention 1: 0/10  Pain Rating Post-Intervention 2: 0/10      Objective:     Communicated with Flavio Aguilar PT for POC and Nursing staff prior to session.  Patient found HOB elevated with bed alarm upon PT entry to room.     General Precautions: Standard, fall  Orthopedic Precautions: N/A  Braces: N/A  Respiratory Status: Room air     Functional Mobility:  Bed Mobility:     Rolling Left:  supervision and stand by assistance  Scooting: stand by assistance  Supine to Sit: minimum assistance  Transfers:     Sit to Stand:  minimum assistance with rolling walker  Bed to Chair: minimum assistance with   rolling walker  using  Step Transfer  Gait: Pt. Amb. With RW and CGA @ gait belt to Min A x 22 feet and x 36 feet with seated rest required between sets. VC required for wider KARINA.   Balance: Fair minus to Fair with bilateral HHA on RW.       AM-PAC 6 CLICK MOBILITY  Turning over in bed (including adjusting bedclothes, sheets and blankets)?: 3  Sitting down on and standing up from a chair with arms (e.g., wheelchair, bedside commode, etc.): 3  Moving from lying on back to sitting on the side of the bed?: 3  Moving to and from a bed to a chair (including a wheelchair)?: 3  Need to walk in hospital room?: 3  Climbing 3-5 steps with a railing?: 1  Basic Mobility Total Score: 16       Treatment & Education:  Pt. Participated in mobility per above and LE PREs in Supine: AP, Hip ER/ Irand QS x 20 reps each and Heel slides with MRE and HIP ABD/ ADD with knee ext 3 x 10 reps each with rests required between sets to complete. Seated: LAQs x 10 reps each bilateral prior to gait.     Patient left up in chair with call button in reach and nursing notified..    GOALS:   Multidisciplinary Problems       Physical Therapy Goals          Problem: Physical Therapy    Goal Priority Disciplines Outcome Goal Variances Interventions   Physical Therapy Goal     PT, PT/OT Ongoing, Progressing     Description: Goals to be met by: 11/5/2023     Patient will increase functional independence with mobility by performing:    STG: Supine to sit with Stand-by Assistance  STG: Sit to supine with Stand-by Assistance  LTG: Sit to stand transfer with Modified [place]  LTG: Bed to chair transfer with Modified [place] using Rolling Walker  LTG: Gait  x 200 feet with Modified [place] using Rolling Walker.   LTG: Stand for 10 minutes with Modified [place] using Rolling Walker                         Time Tracking:     PT Received On: 10/24/23  PT Start Time: 1105     PT Stop Time: 1157  PT Total Time (min): 52 min     Billable Minutes:  Gait Training 15, Therapeutic Activity 15, and Therapeutic Exercise 22    Treatment Type: Treatment  PT/PTA: PTA     Number of PTA visits since last PT visit: 4     10/24/2023

## 2023-10-24 NOTE — PLAN OF CARE
[facility] - Medical Surgical Unit - Swing Bed   Interdisciplinary Team Meeting    Patient: Joey King   Today's Date: 10/24/2023   Estimated D/C Date: 11/6/2023        Physician: Len Palacios MD Nurse Practitioner: DANN   Pharmacy: Melyssa Musa PharmD Unit Director: BRANDON Merchant   :  Cinthya Avila RN Physical/Occupational Therapy:  Flavio Aguilar PT   Speech Therapy: DANN Activity Therapy: Nesha Shane LPN   Nursing: BRANDON Merchant  Respiratory: Moon Daley, RT Dietary: Nam Randle  Other:      Nursing  New Symptoms/Problems: N/A  Last Bowel Movement: 10/23/23   Urine: incontinent  Rogers: No   Bowel: incontinent  Constipated: No  Diarrhea: No   Isolation: No  Cognition: WNL  Aspiration Precautions:No  Wound Care: Yes  Wound Location/Tx: Incision RLQ abdomen, Skin tear left hand, open area to coccyx  Comment(s):     Respiratory   O2 Device: Room Air  O2 Flow:   SpO2: 96%  Neb Tx: No  Comment(s):      Dietary  Nutrition:  Dysphagia Mechanical Soft  Comment(s):     Speech Therapy  Speech/Swallowing: No current speech or swallowing issues  Comment(s):     Physical Therapy  Gait/Assistive Device: Min w/ RW ELOS: Plan to DC 11/6/2023    Transfers: Minimal Assistance  Bed Mobility: Minimal Assistance Range of Motion/Restrictions: N/A  Comment(s):      Occupational Therapy  Eating/Grooming: Modified Independent Toileting: Minimal Assistance   Bathing: Minimal Assistance Dressing (Upper Body): Minimal Assistance   Dressing (Lower Body): Moderate Assistance Comment(s):      Activity Therapy  Level of participation: Active participation  Comment(s):     Pharmacy  Medication Changes (see MD orders in chart): Yes  Labs Reviewed: Yes  New Lab Orders: Yes  Comment(s): Labs drawn every M/Th      Tx Plan/Recommendations reviewed with family and/or patient on (date) 10/20/2023.  Additional family Conference/Training: Will discuss plans with  daughter  D/C Plan/Recommendations: Home with HH and Home with family  DAVID: 11/6/2023  Comment(s): Patient will discharge home with daughter and [facility].

## 2023-10-24 NOTE — PLAN OF CARE
Problem: Skin Injury Risk Increased  Goal: Skin Health and Integrity  Outcome: Ongoing, Progressing  Intervention: Optimize Skin Protection  Flowsheets (Taken 10/24/2023 1812)  Pressure Reduction Techniques:   frequent weight shift encouraged   weight shift assistance provided  Pressure Reduction Devices: pressure-redistributing mattress utilized  Skin Protection:   adhesive use limited   incontinence pads utilized  Head of Bed (HOB) Positioning: HOB elevated     Problem: Fall Injury Risk  Goal: Absence of Fall and Fall-Related Injury  Outcome: Ongoing, Progressing  Intervention: Identify and Manage Contributors  Flowsheets (Taken 10/24/2023 1812)  Self-Care Promotion:   independence encouraged   BADL personal objects within reach   BADL personal routines maintained   meal set-up provided   safe use of adaptive equipment encouraged  Intervention: Promote Injury-Free Environment  Flowsheets (Taken 10/24/2023 1812)  Safety Promotion/Fall Prevention:   assistive device/personal item within reach   bed alarm set   diversional activities provided   Fall Risk reviewed with patient/family   Fall Risk signage in place   nonskid shoes/socks when out of bed   side rails raised x 3   room near unit station   instructed to call staff for mobility

## 2023-10-25 PROCEDURE — 94761 N-INVAS EAR/PLS OXIMETRY MLT: CPT

## 2023-10-25 PROCEDURE — 97535 SELF CARE MNGMENT TRAINING: CPT

## 2023-10-25 PROCEDURE — 97530 THERAPEUTIC ACTIVITIES: CPT | Mod: CQ

## 2023-10-25 PROCEDURE — 25000003 PHARM REV CODE 250: Performed by: NURSE PRACTITIONER

## 2023-10-25 PROCEDURE — 97530 THERAPEUTIC ACTIVITIES: CPT

## 2023-10-25 PROCEDURE — 11000004 HC SNF PRIVATE

## 2023-10-25 RX ADMIN — CHOLECALCIFEROL TAB 25 MCG (1000 UNIT) 1000 UNITS: 25 TAB at 08:10

## 2023-10-25 RX ADMIN — SENNOSIDES AND DOCUSATE SODIUM 1 TABLET: 50; 8.6 TABLET ORAL at 08:10

## 2023-10-25 RX ADMIN — LEVOTHYROXINE SODIUM 100 MCG: 0.1 TABLET ORAL at 05:10

## 2023-10-25 RX ADMIN — Medication 100 MG: at 08:10

## 2023-10-25 RX ADMIN — APIXABAN 5 MG: 2.5 TABLET, FILM COATED ORAL at 08:10

## 2023-10-25 RX ADMIN — ASPIRIN 81 MG: 81 TABLET, COATED ORAL at 08:10

## 2023-10-25 RX ADMIN — CLONIDINE HYDROCHLORIDE 0.1 MG: 0.1 TABLET ORAL at 04:10

## 2023-10-25 RX ADMIN — FLECAINIDE ACETATE 150 MG: 50 TABLET ORAL at 08:10

## 2023-10-25 RX ADMIN — CITALOPRAM HYDROBROMIDE 40 MG: 20 TABLET ORAL at 08:10

## 2023-10-25 RX ADMIN — Medication 400 UNITS: at 08:10

## 2023-10-25 RX ADMIN — OXYBUTYNIN CHLORIDE 15 MG: 5 TABLET, EXTENDED RELEASE ORAL at 08:10

## 2023-10-25 RX ADMIN — SUCRALFATE 1 G: 1 TABLET ORAL at 04:10

## 2023-10-25 RX ADMIN — ARIPIPRAZOLE 5 MG: 5 TABLET ORAL at 08:10

## 2023-10-25 RX ADMIN — PANTOPRAZOLE SODIUM 40 MG: 40 TABLET, DELAYED RELEASE ORAL at 08:10

## 2023-10-25 RX ADMIN — SUCRALFATE 1 G: 1 TABLET ORAL at 05:10

## 2023-10-25 RX ADMIN — THERA TABS 1 TABLET: TAB at 08:10

## 2023-10-25 RX ADMIN — POLYETHYLENE GLYCOL 3350 17 G: 17 POWDER, FOR SOLUTION ORAL at 08:10

## 2023-10-25 RX ADMIN — OMEGA-3 FATTY ACIDS CAP 1000 MG 1 CAPSULE: 1000 CAP at 08:10

## 2023-10-25 RX ADMIN — CYANOCOBALAMIN TAB 500 MCG 1000 MCG: 500 TAB at 08:10

## 2023-10-25 RX ADMIN — SUCRALFATE 1 G: 1 TABLET ORAL at 08:10

## 2023-10-25 RX ADMIN — SUCRALFATE 1 G: 1 TABLET ORAL at 11:10

## 2023-10-25 NOTE — PT/OT/SLP PROGRESS
"Physical Therapy Treatment    Patient Name:  Joey King   MRN:  [id number]    Recommendations:     Discharge Recommendations: Moderate Intensity Therapy  Discharge Equipment Recommendations: none  Barriers to discharge: Decreased caregiver support    Assessment:     Joey King is a 83 y.o. male admitted with a medical diagnosis of Weakness.  He presents with the following impairments/functional limitations: weakness, impaired endurance, impaired self care skills, impaired balance, decreased lower extremity function, decreased safety awareness Patient presented with fear this afternoon with therapy. Patient reported his knee "buckling" on him this morning causing him to fear wanting to do anything else with it today. Patient was still agreeable to participate in transfer training with therapist this afternoon.     Rehab Prognosis: Good; patient would benefit from acute skilled PT services to address these deficits and reach maximum level of function.    Recent Surgery: * No surgery found *      Plan:     During this hospitalization, patient to be seen 5 x/week to address the identified rehab impairments via gait training, therapeutic activities, therapeutic exercises, neuromuscular re-education and progress toward the following goals:    Plan of Care Expires:  11/05/23    Subjective     Chief Complaint: weakness  Patient/Family Comments/goals: return home   Pain/Comfort:  Pain Rating 1: 0/10  Pain Rating Post-Intervention 1: 0/10      Objective:     Communicated with PT prior to session.  Patient found up in chair with   upon PT entry to room.     General Precautions: Standard, fall  Orthopedic Precautions: N/A  Braces: N/A  Respiratory Status: Room air     Functional Mobility:  Bed Mobility:     Sit to Supine: modified independence  Transfers:     Sit to Stand:  modified independence with hand-held assist  Bed to Chair: modified independence with  hand-held assist  using  Stand Pivot      AM-PAC " "6 CLICK MOBILITY  Turning over in bed (including adjusting bedclothes, sheets and blankets)?: 3  Sitting down on and standing up from a chair with arms (e.g., wheelchair, bedside commode, etc.): 3  Moving from lying on back to sitting on the side of the bed?: 3  Moving to and from a bed to a chair (including a wheelchair)?: 3  Need to walk in hospital room?: 3  Climbing 3-5 steps with a railing?: 1  Basic Mobility Total Score: 16       Treatment & Education:  Transfers as listed above.    Patient was unwilling to participate in ambulation as well as exercises due to reporting fear from his knee "buckling" on him this morning. Patient received education on how important exercises are in order to help prevent knee buckling, but patient still requested exercises be held off until tomorrow.     Patient left supine with call button in reach..    GOALS:   Multidisciplinary Problems       Physical Therapy Goals          Problem: Physical Therapy    Goal Priority Disciplines Outcome Goal Variances Interventions   Physical Therapy Goal     PT, PT/OT Ongoing, Progressing     Description: Goals to be met by: 11/5/2023     Patient will increase functional independence with mobility by performing:    STG: Supine to sit with Stand-by Assistance  STG: Sit to supine with Stand-by Assistance  LTG: Sit to stand transfer with Modified [place]  LTG: Bed to chair transfer with Modified [place] using Rolling Walker  LTG: Gait  x 200 feet with Modified [place] using Rolling Walker.   LTG: Stand for 10 minutes with Modified [place] using Rolling Walker                         Time Tracking:     PT Received On: 10/25/23  PT Start Time: 1430     PT Stop Time: 1454  PT Total Time (min): 24 min     Billable Minutes: Therapeutic Activity 24    Treatment Type: Treatment  PT/PTA: PTA     Number of PTA visits since last PT visit: 5   JO Vann  10/25/2023  "

## 2023-10-25 NOTE — PT/OT/SLP PROGRESS
Occupational Therapy   Treatment    Name: Joey King  MRN: [id number]  Admitting Diagnosis:  Weakness       Recommendations:     Discharge Recommendations: Moderate Intensity Therapy  Discharge Equipment Recommendations:  none  Barriers to discharge:  None    Assessment:     Joey King is a 83 y.o. male with a medical diagnosis of Weakness.  He presents with weakness and decline with ADLs. Performance deficits affecting function are weakness, impaired endurance, impaired self care skills, impaired functional mobility, impaired balance, decreased lower extremity function, decreased safety awareness.     Rehab Prognosis:  Fair; patient would benefit from acute skilled OT services to address these deficits and reach maximum level of function.       Plan:     Patient to be seen 5 x/week to address the above listed problems via therapeutic exercises  Plan of Care Expires:    Plan of Care Reviewed with: patient    Subjective     Chief Complaint: Weakness  Patient/Family Comments/goals: to return to prior level of function  Pain/Comfort:       Objective:     Communicated with: Nurse prior to session.  Patient found supine with bed alarm upon OT entry to room.    General Precautions: Standard, fall    Orthopedic Precautions:N/A  Braces: N/A  Respiratory Status: Room air     Occupational Performance:     Bed Mobility:    Patient completed Rolling/Turning to Left with  minimum assistance  Patient completed Rolling/Turning to Right with minimum assistance  Patient completed Scooting/Bridging with minimum assistance  Patient completed Supine to Sit with minimum assistance     Functional Mobility/Transfers:  Patient completed Sit <> Stand Transfer with minimum assistance  with  rolling walker   Patient completed Bed <> Chair Transfer using Step Transfer technique with moderate assistance with rolling walker  Patient completed Toilet Transfer Step Transfer technique with moderate assistance with  rolling  walker  Functional Mobility: patient ambulated to/from bathroom with Min A with no unsafe fatigue.     Activities of Daily Living:  Grooming: minimum assistance to perform facial hygiene  Bathing: moderate assistance to perform sponge bath to wash back while sitting and assist with washing buttocks while standing  Upper Body Dressing: minimum assistance to pradeep hospital gown  Lower Body Dressing: moderate assistance to pradeep underwear   Toileting: minimum assistance to perform perineal care      AMPAC 6 Click ADL:      Treatment & Education:  Pt performed B UE strengthening exercises to include:   Shoulder flexion   Chest press    Elbow flexion   Elbow extension   Pectoral stretches   Bilateral rowing  All exercises performed 3x10 reps using 2# dowel and red theraband.     Patient left up in chair with all lines intact and call button in reach    GOALS:   Multidisciplinary Problems       Occupational Therapy Goals          Problem: Occupational Therapy    Goal Priority Disciplines Outcome Interventions   Occupational Therapy Goal     OT, PT/OT Ongoing, Progressing    Description: Grooming Status:   Short Term Goal: Pt will perform grooming with SBA sitting EOB.   Long Term Goal: Pt will perform grooming/oral hygiene standing at sink with SBA      LE dressing Status:   Short Term Goal: Pt will perform LE dressing with SBA.   Long Term Goal: Pt will perform LE dressing with Mod I.    Toileting Status:   Short Term Goal: Pt will perform toilet hygiene on BSC with Mod I.  Long Term Goal: Pt will perform toilet hygiene on toilet with no AE with Mod I.    Commode Transfer:   Short Term Goal: Pt will perform BSC t/f with Mod I.  Long Term Goal:  Pt will perform toilet t/f in bathroom with Mod I.     Bathing Status:   Long Term Goal: Pt will perform sponge bath with Mod I with no unsafe fatigue.     Strength Status: 5/5 BUEs  Long Term Goal: Pt to perform BUE strengthening with weights and/or body weight to increase ADL  independence and safety    Endurance Status:   Short Term Goal:pt to perform 15 min OT treatment with 5 or greater rest breaks  Long Term Goal: pt to perform 30 min OT treat with 3 or less rest breaks                          Time Tracking:     OT Date of Treatment: 10/25/23  OT Start Time: 1233  OT Stop Time: 1318  OT Total Time (min): 45 min    Billable Minutes:Self Care/Home Management 35 min  Therapeutic Activity 10 min      KAREN Varela/L, CSRS  OT/ALBA: OT          10/25/2023

## 2023-10-25 NOTE — PROGRESS NOTES
Wt: 167# noted. RDN visited pt this a.m. and took prefs.  Meal intake ~65% and he receives Boost Glucose control BID.  He wants to change flavor to Chocolate.   Hgb 10.3, Hct 32.2.  Rec 1. [place] prefs 2. Enc intake.

## 2023-10-25 NOTE — PLAN OF CARE
[facility] - Medical Surgical Unit  Discharge Assessment    Primary Care Provider: Prashanth Ryan, DO     Discharge Assessment (most recent)       BRIEF DISCHARGE ASSESSMENT - 10/25/23 3083          Discharge Planning    Assessment Type Discharge Planning Brief Assessment (P)                    Visited with Mr. King and discussed DAVID, Mr. King says he doesn't want to stay here that long.  Spoke with therapy and explained to him that at this time its not safe for him to go home alone r/t requiring many verbal cues to ambulate/transfer safely.

## 2023-10-25 NOTE — PLAN OF CARE
Problem: Adult Inpatient Plan of Care  Goal: Plan of Care Review  Outcome: Ongoing, Progressing  Goal: Patient-Specific Goal (Individualized)  Outcome: Ongoing, Progressing  Flowsheets (Taken 10/24/2023 2137)  Anxieties, Fears or Concerns: none  Individualized Care Needs: To gain increased strength within the next week to discharge home with daughter

## 2023-10-25 NOTE — PLAN OF CARE
Problem: Adult Inpatient Plan of Care  Goal: Plan of Care Review  Outcome: Ongoing, Progressing  Goal: Patient-Specific Goal (Individualized)  Outcome: Ongoing, Progressing  Goal: Absence of Hospital-Acquired Illness or Injury  Outcome: Ongoing, Progressing  Goal: Optimal Comfort and Wellbeing  Outcome: Ongoing, Progressing  Goal: Readiness for Transition of Care  Outcome: Ongoing, Progressing  Intervention: Mutually Develop Transition Plan  Flowsheets (Taken 10/25/2023 1323)  Transportation Anticipated: family or friend will provide     Problem: Impaired Wound Healing  Goal: Optimal Wound Healing  Outcome: Ongoing, Progressing     Problem: Skin Injury Risk Increased  Goal: Skin Health and Integrity  Outcome: Ongoing, Progressing     Problem: Fall Injury Risk  Goal: Absence of Fall and Fall-Related Injury  Outcome: Ongoing, Progressing  Intervention: Identify and Manage Contributors  Flowsheets (Taken 10/25/2023 1323)  Self-Care Promotion: independence encouraged  Medication Review/Management: medications reviewed

## 2023-10-26 LAB
ANION GAP SERPL CALCULATED.3IONS-SCNC: 8 MMOL/L (ref 7–16)
BASOPHILS # BLD AUTO: 0.04 K/UL (ref 0–0.2)
BASOPHILS NFR BLD AUTO: 0.9 % (ref 0–1)
BUN SERPL-MCNC: 10 MG/DL (ref 7–18)
BUN/CREAT SERPL: 10 (ref 6–20)
CALCIUM SERPL-MCNC: 8.9 MG/DL (ref 8.5–10.1)
CHLORIDE SERPL-SCNC: 103 MMOL/L (ref 98–107)
CO2 SERPL-SCNC: 30 MMOL/L (ref 21–32)
CREAT SERPL-MCNC: 0.98 MG/DL (ref 0.7–1.3)
DIFFERENTIAL METHOD BLD: ABNORMAL
EGFR (NO RACE VARIABLE) (RUSH/TITUS): 76 ML/MIN/1.73M2
EOSINOPHIL # BLD AUTO: 0.1 K/UL (ref 0–0.5)
EOSINOPHIL NFR BLD AUTO: 2.3 % (ref 1–4)
ERYTHROCYTE [DISTWIDTH] IN BLOOD BY AUTOMATED COUNT: 14.3 % (ref 11.5–14.5)
GLUCOSE SERPL-MCNC: 84 MG/DL (ref 74–106)
HCT VFR BLD AUTO: 33.4 % (ref 40–54)
HGB BLD-MCNC: 10.8 G/DL (ref 13.5–18)
LYMPHOCYTES # BLD AUTO: 1.38 K/UL (ref 1–4.8)
LYMPHOCYTES NFR BLD AUTO: 31.1 % (ref 27–41)
MAGNESIUM SERPL-MCNC: 1.7 MG/DL (ref 1.7–2.3)
MCH RBC QN AUTO: 30.9 PG (ref 27–31)
MCHC RBC AUTO-ENTMCNC: 32.3 G/DL (ref 32–36)
MCV RBC AUTO: 95.7 FL (ref 80–96)
MONOCYTES # BLD AUTO: 0.69 K/UL (ref 0–0.8)
MONOCYTES NFR BLD AUTO: 15.5 % (ref 2–6)
MPC BLD CALC-MCNC: 8.5 FL (ref 9.4–12.4)
NEUTROPHILS # BLD AUTO: 2.23 K/UL (ref 1.8–7.7)
NEUTROPHILS NFR BLD AUTO: 50.2 % (ref 53–65)
PHOSPHATE SERPL-MCNC: 3 MG/DL (ref 2.5–4.5)
PLATELET # BLD AUTO: 235 K/UL (ref 150–400)
POTASSIUM SERPL-SCNC: 3.6 MMOL/L (ref 3.5–5.1)
RBC # BLD AUTO: 3.49 M/UL (ref 4.6–6.2)
SODIUM SERPL-SCNC: 137 MMOL/L (ref 136–145)
WBC # BLD AUTO: 4.44 K/UL (ref 4.5–11)

## 2023-10-26 PROCEDURE — 25000003 PHARM REV CODE 250: Performed by: NURSE PRACTITIONER

## 2023-10-26 PROCEDURE — 27000944

## 2023-10-26 PROCEDURE — 97110 THERAPEUTIC EXERCISES: CPT

## 2023-10-26 PROCEDURE — 85025 COMPLETE CBC W/AUTO DIFF WBC: CPT | Performed by: NURSE PRACTITIONER

## 2023-10-26 PROCEDURE — 97530 THERAPEUTIC ACTIVITIES: CPT

## 2023-10-26 PROCEDURE — 27000982 HC MATTRESS, MATRIX LAL RENTAL

## 2023-10-26 PROCEDURE — 80048 BASIC METABOLIC PNL TOTAL CA: CPT | Performed by: NURSE PRACTITIONER

## 2023-10-26 PROCEDURE — 11000004 HC SNF PRIVATE

## 2023-10-26 PROCEDURE — 94761 N-INVAS EAR/PLS OXIMETRY MLT: CPT

## 2023-10-26 PROCEDURE — 84100 ASSAY OF PHOSPHORUS: CPT | Performed by: NURSE PRACTITIONER

## 2023-10-26 PROCEDURE — 97116 GAIT TRAINING THERAPY: CPT

## 2023-10-26 PROCEDURE — 83735 ASSAY OF MAGNESIUM: CPT | Performed by: NURSE PRACTITIONER

## 2023-10-26 RX ADMIN — APIXABAN 5 MG: 2.5 TABLET, FILM COATED ORAL at 08:10

## 2023-10-26 RX ADMIN — CHOLECALCIFEROL TAB 25 MCG (1000 UNIT) 1000 UNITS: 25 TAB at 08:10

## 2023-10-26 RX ADMIN — OXYBUTYNIN CHLORIDE 15 MG: 5 TABLET, EXTENDED RELEASE ORAL at 08:10

## 2023-10-26 RX ADMIN — OMEGA-3 FATTY ACIDS CAP 1000 MG 1 CAPSULE: 1000 CAP at 08:10

## 2023-10-26 RX ADMIN — SENNOSIDES AND DOCUSATE SODIUM 1 TABLET: 50; 8.6 TABLET ORAL at 08:10

## 2023-10-26 RX ADMIN — LEVOTHYROXINE SODIUM 100 MCG: 0.1 TABLET ORAL at 06:10

## 2023-10-26 RX ADMIN — ASPIRIN 81 MG: 81 TABLET, COATED ORAL at 08:10

## 2023-10-26 RX ADMIN — POLYETHYLENE GLYCOL 3350 17 G: 17 POWDER, FOR SOLUTION ORAL at 08:10

## 2023-10-26 RX ADMIN — THERA TABS 1 TABLET: TAB at 08:10

## 2023-10-26 RX ADMIN — FLECAINIDE ACETATE 150 MG: 50 TABLET ORAL at 08:10

## 2023-10-26 RX ADMIN — CYANOCOBALAMIN TAB 500 MCG 1000 MCG: 500 TAB at 08:10

## 2023-10-26 RX ADMIN — SUCRALFATE 1 G: 1 TABLET ORAL at 06:10

## 2023-10-26 RX ADMIN — Medication 400 UNITS: at 08:10

## 2023-10-26 RX ADMIN — Medication 100 MG: at 08:10

## 2023-10-26 RX ADMIN — SUCRALFATE 1 G: 1 TABLET ORAL at 04:10

## 2023-10-26 RX ADMIN — ARIPIPRAZOLE 5 MG: 5 TABLET ORAL at 08:10

## 2023-10-26 RX ADMIN — CLONIDINE HYDROCHLORIDE 0.1 MG: 0.1 TABLET ORAL at 04:10

## 2023-10-26 RX ADMIN — SUCRALFATE 1 G: 1 TABLET ORAL at 08:10

## 2023-10-26 RX ADMIN — SUCRALFATE 1 G: 1 TABLET ORAL at 11:10

## 2023-10-26 RX ADMIN — CITALOPRAM HYDROBROMIDE 40 MG: 20 TABLET ORAL at 08:10

## 2023-10-26 RX ADMIN — PANTOPRAZOLE SODIUM 40 MG: 40 TABLET, DELAYED RELEASE ORAL at 08:10

## 2023-10-26 NOTE — PT/OT/SLP PROGRESS
Physical Therapy Treatment    Patient Name:  Joey King   MRN:  [id number]    Recommendations:     Discharge Recommendations: Moderate Intensity Therapy  Discharge Equipment Recommendations: none  Barriers to discharge: Decreased caregiver support    Assessment:     Joey King is a 83 y.o. male admitted with a medical diagnosis of Weakness.  He presents with the following impairments/functional limitations: weakness, impaired endurance, impaired self care skills, impaired functional mobility, gait instability, impaired balance, decreased lower extremity function . Patient states he has not gotten OOB since yesterday with PT, is fearful that his right knee may buckle again.    Rehab Prognosis: Good; patient would benefit from acute skilled PT services to address these deficits and reach maximum level of function.    Recent Surgery: * No surgery found *      Plan:     During this hospitalization, patient to be seen 5 x/week to address the identified rehab impairments via gait training, therapeutic activities, therapeutic exercises, neuromuscular re-education and progress toward the following goals:    Plan of Care Expires:  11/05/23    Subjective     Chief Complaint: weakness and above anxiety that his right knee will buckle with activity   Patient/Family Comments/goals: Patient states he wants to go home, but has not met mobility goals yet and is home alone.  His dtr is visiting today from her home 4 hrs away.  Pain/Comfort:  Pain Rating 1: 0/10      Objective:     Communicated with patient and his daughter prior to session.  Patient found with bed in chair position with bed alarm, telemetry, peripheral IV upon PT entry to room.     General Precautions: Standard, fall  Orthopedic Precautions: N/A  Braces: N/A  Respiratory Status: Room air     Functional Mobility:    Bed mobility  Rolling to right side with modified independence  Scooting with modified independence  Bridging with modified  independence  Sit to supine with min assist  Supine to sit with contact guard assistance  Transfer sit to stand with contact guard assistance  Transfer to toilet with min assist   Gait with RW with contact guard assistance        AM-PAC 6 CLICK MOBILITY  Turning over in bed (including adjusting bedclothes, sheets and blankets)?: 3  Sitting down on and standing up from a chair with arms (e.g., wheelchair, bedside commode, etc.): 3  Moving from lying on back to sitting on the side of the bed?: 3  Moving to and from a bed to a chair (including a wheelchair)?: 3  Need to walk in hospital room?: 3  Climbing 3-5 steps with a railing?: 2  Basic Mobility Total Score: 17       Treatment & Education:  Patient participated in gait training using RW with Contact guard assist for 20 ft x 2 attempts each with min verbal cues to increase his KARINA to improve balance.  Sat at EOB and performed ex of AP x 20- reps, LAQ x 20 reps and marching x 20 reps  Patient also performed sit to stand at toilet with min assist needed and min vc to use grab bar to assist in lowering and in standing    Patient left with bed in chair position with call button in reach and bed alarm on. Daughter present.    GOALS:   Multidisciplinary Problems       Physical Therapy Goals          Problem: Physical Therapy    Goal Priority Disciplines Outcome Goal Variances Interventions   Physical Therapy Goal     PT, PT/OT Ongoing, Progressing     Description: Goals to be met by: 11/5/2023     Patient will increase functional independence with mobility by performing:    STG: Supine to sit with Stand-by Assistance  STG: Sit to supine with Stand-by Assistance  LTG: Sit to stand transfer with Modified [place]  LTG: Bed to chair transfer with Modified [place] using Rolling Walker  LTG: Gait  x 200 feet with Modified [place] using Rolling Walker.   LTG: Stand for 10 minutes with Modified [place] using Rolling Walker                         Time  Tracking:     PT Received On: 10/26/23  PT Start Time: 0145     PT Stop Time: 0230  PT Total Time (min): 45 min     Billable Minutes: Gait Training 20, Therapeutic Activity 8, and Therapeutic Exercise 17    Treatment Type: Treatment  PT/PTA: PT     Number of PTA visits since last PT visit: 0     10/26/2023

## 2023-10-26 NOTE — PLAN OF CARE
Problem: Adult Inpatient Plan of Care  Goal: Plan of Care Review  Outcome: Ongoing, Progressing  Goal: Patient-Specific Goal (Individualized)  Outcome: Ongoing, Progressing  Goal: Absence of Hospital-Acquired Illness or Injury  Outcome: Ongoing, Progressing  Goal: Optimal Comfort and Wellbeing  Outcome: Ongoing, Progressing  Goal: Readiness for Transition of Care  Outcome: Ongoing, Progressing  Intervention: Mutually Develop Transition Plan  Flowsheets (Taken 10/26/2023 1142)  Transportation Anticipated: family or friend will provide     Problem: Impaired Wound Healing  Goal: Optimal Wound Healing  Outcome: Ongoing, Progressing  Intervention: Promote Wound Healing  Flowsheets (Taken 10/26/2023 1142)  Oral Nutrition Promotion: safe use of adaptive equipment encouraged     Problem: Skin Injury Risk Increased  Goal: Skin Health and Integrity  Outcome: Ongoing, Progressing  Intervention: Promote and Optimize Oral Intake  Flowsheets (Taken 10/26/2023 1142)  Oral Nutrition Promotion: safe use of adaptive equipment encouraged     Problem: Fall Injury Risk  Goal: Absence of Fall and Fall-Related Injury  Outcome: Ongoing, Progressing  Intervention: Identify and Manage Contributors  Flowsheets (Taken 10/26/2023 1142)  Self-Care Promotion: independence encouraged  Medication Review/Management: medications reviewed

## 2023-10-26 NOTE — PLAN OF CARE
Problem: Adult Inpatient Plan of Care  Goal: Patient-Specific Goal (Individualized)  Outcome: Ongoing, Progressing  Goal: Absence of Hospital-Acquired Illness or Injury  Outcome: Ongoing, Progressing  Intervention: Identify and Manage Fall Risk  Flowsheets (Taken 10/26/2023 0252)  Safety Promotion/Fall Prevention:   assistive device/personal item within reach   instructed to call staff for mobility   side rails raised x 2     Problem: Fall Injury Risk  Goal: Absence of Fall and Fall-Related Injury  Intervention: Promote Injury-Free Environment  Flowsheets (Taken 10/26/2023 0252)  Safety Promotion/Fall Prevention:   assistive device/personal item within reach   instructed to call staff for mobility   side rails raised x 2

## 2023-10-27 PROCEDURE — 97116 GAIT TRAINING THERAPY: CPT | Mod: CQ

## 2023-10-27 PROCEDURE — 27000944

## 2023-10-27 PROCEDURE — 11000004 HC SNF PRIVATE

## 2023-10-27 PROCEDURE — 97110 THERAPEUTIC EXERCISES: CPT | Mod: CQ

## 2023-10-27 PROCEDURE — 97110 THERAPEUTIC EXERCISES: CPT

## 2023-10-27 PROCEDURE — 25000003 PHARM REV CODE 250: Performed by: NURSE PRACTITIONER

## 2023-10-27 PROCEDURE — 27000982 HC MATTRESS, MATRIX LAL RENTAL

## 2023-10-27 PROCEDURE — 94761 N-INVAS EAR/PLS OXIMETRY MLT: CPT

## 2023-10-27 PROCEDURE — 99900035 HC TECH TIME PER 15 MIN (STAT)

## 2023-10-27 RX ADMIN — CLONIDINE HYDROCHLORIDE 0.1 MG: 0.1 TABLET ORAL at 04:10

## 2023-10-27 RX ADMIN — ARIPIPRAZOLE 5 MG: 5 TABLET ORAL at 09:10

## 2023-10-27 RX ADMIN — Medication 100 MG: at 09:10

## 2023-10-27 RX ADMIN — ACETAMINOPHEN 650 MG: 325 TABLET ORAL at 07:10

## 2023-10-27 RX ADMIN — SUCRALFATE 1 G: 1 TABLET ORAL at 04:10

## 2023-10-27 RX ADMIN — OMEGA-3 FATTY ACIDS CAP 1000 MG 1 CAPSULE: 1000 CAP at 09:10

## 2023-10-27 RX ADMIN — PANTOPRAZOLE SODIUM 40 MG: 40 TABLET, DELAYED RELEASE ORAL at 09:10

## 2023-10-27 RX ADMIN — APIXABAN 5 MG: 2.5 TABLET, FILM COATED ORAL at 08:10

## 2023-10-27 RX ADMIN — ASPIRIN 81 MG: 81 TABLET, COATED ORAL at 08:10

## 2023-10-27 RX ADMIN — SENNOSIDES AND DOCUSATE SODIUM 1 TABLET: 50; 8.6 TABLET ORAL at 09:10

## 2023-10-27 RX ADMIN — THERA TABS 1 TABLET: TAB at 09:10

## 2023-10-27 RX ADMIN — SUCRALFATE 1 G: 1 TABLET ORAL at 11:10

## 2023-10-27 RX ADMIN — CHOLECALCIFEROL TAB 25 MCG (1000 UNIT) 1000 UNITS: 25 TAB at 09:10

## 2023-10-27 RX ADMIN — FLECAINIDE ACETATE 150 MG: 50 TABLET ORAL at 09:10

## 2023-10-27 RX ADMIN — SUCRALFATE 1 G: 1 TABLET ORAL at 08:10

## 2023-10-27 RX ADMIN — POLYETHYLENE GLYCOL 3350 17 G: 17 POWDER, FOR SOLUTION ORAL at 09:10

## 2023-10-27 RX ADMIN — LEVOTHYROXINE SODIUM 100 MCG: 0.1 TABLET ORAL at 05:10

## 2023-10-27 RX ADMIN — SUCRALFATE 1 G: 1 TABLET ORAL at 05:10

## 2023-10-27 RX ADMIN — CITALOPRAM HYDROBROMIDE 40 MG: 20 TABLET ORAL at 09:10

## 2023-10-27 RX ADMIN — CYANOCOBALAMIN TAB 500 MCG 1000 MCG: 500 TAB at 09:10

## 2023-10-27 RX ADMIN — Medication 400 UNITS: at 09:10

## 2023-10-27 RX ADMIN — FLECAINIDE ACETATE 150 MG: 50 TABLET ORAL at 08:10

## 2023-10-27 RX ADMIN — OXYBUTYNIN CHLORIDE 15 MG: 5 TABLET, EXTENDED RELEASE ORAL at 09:10

## 2023-10-27 RX ADMIN — SENNOSIDES AND DOCUSATE SODIUM 1 TABLET: 50; 8.6 TABLET ORAL at 08:10

## 2023-10-27 RX ADMIN — APIXABAN 5 MG: 2.5 TABLET, FILM COATED ORAL at 09:10

## 2023-10-27 NOTE — PLAN OF CARE
[facility] - Medical Surgical Unit  Discharge Assessment    Primary Care Provider: Prashanth Ryan, DO     Discharge Assessment (most recent)       BRIEF DISCHARGE ASSESSMENT - 10/27/23 1154          Discharge Planning    Assessment Type Discharge Planning Brief Assessment (P)      Resource/Environmental Concerns none (P)      Support Systems Children (P)                      Visited with Mr. King, his plans are to return home and preferably before his DAVID.

## 2023-10-27 NOTE — PT/OT/SLP PROGRESS
Occupational Therapy   Treatment    Name: Joey King  MRN: [id number]  Admitting Diagnosis:  Weakness       Recommendations:     Discharge Recommendations: Moderate Intensity Therapy  Discharge Equipment Recommendations:  none  Barriers to discharge:  None    Assessment:     Joey King is a 83 y.o. male with a medical diagnosis of Weakness.  He presents with weakness and decline with ADLs. Performance deficits affecting function are weakness, impaired endurance, impaired self care skills, impaired functional mobility, impaired balance, decreased lower extremity function, decreased safety awareness.     Rehab Prognosis:  Fair; patient would benefit from acute skilled OT services to address these deficits and reach maximum level of function.       Plan:     Patient to be seen 5 x/week to address the above listed problems via therapeutic exercises  Plan of Care Expires:    Plan of Care Reviewed with: patient    Subjective     Chief Complaint: Weakness  Patient/Family Comments/goals: to return to prior level of function  Pain/Comfort:       Objective:     Communicated with: Nurse prior to session.  Patient found supine with bed alarm upon OT entry to room.    General Precautions: Standard, fall    Orthopedic Precautions:N/A  Braces: N/A  Respiratory Status: Room air     Occupational Performance:     Bed Mobility:    Patient sitting up in a chair upon arrival.     Functional Mobility/Transfers:  Not tested    Activities of Daily Living:  Not tested      [facility] 6 Click ADL:      Treatment & Education:  Pt performed B UE strengthening exercises to include:   Shoulder flexion   Chest press    Elbow flexion   Elbow extension   Pectoral stretches   Bilateral rowing  All exercises performed 3x10 reps using 2# dowel and red theraband.     Patient left up in chair with all lines intact and call button in reach    GOALS:   Multidisciplinary Problems       Occupational Therapy Goals          Problem:  Occupational Therapy    Goal Priority Disciplines Outcome Interventions   Occupational Therapy Goal     OT, PT/OT Ongoing, Progressing    Description: Grooming Status:   Short Term Goal: Pt will perform grooming with SBA sitting EOB.   Long Term Goal: Pt will perform grooming/oral hygiene standing at sink with SBA      LE dressing Status:   Short Term Goal: Pt will perform LE dressing with SBA.   Long Term Goal: Pt will perform LE dressing with Mod I.    Toileting Status:   Short Term Goal: Pt will perform toilet hygiene on BSC with Mod I.  Long Term Goal: Pt will perform toilet hygiene on toilet with no AE with Mod I.    Commode Transfer:   Short Term Goal: Pt will perform BSC t/f with Mod I.  Long Term Goal:  Pt will perform toilet t/f in bathroom with Mod I.     Bathing Status:   Long Term Goal: Pt will perform sponge bath with Mod I with no unsafe fatigue.     Strength Status: 5/5 BUEs  Long Term Goal: Pt to perform BUE strengthening with weights and/or body weight to increase ADL independence and safety    Endurance Status:   Short Term Goal:pt to perform 15 min OT treatment with 5 or greater rest breaks  Long Term Goal: pt to perform 30 min OT treat with 3 or less rest breaks                          Time Tracking:     OT Date of Treatment: 10/27/23  OT Start Time: 1010  OT Stop Time: 1035  OT Total Time (min): 25 min    Billable Minutes:Therapeutic Exercise 25 minutes      KAREN Varela/L, CSRS  OT/ALBA: OT          10/27/2023

## 2023-10-27 NOTE — PT/OT/SLP PROGRESS
Diagnostic wire inserted. Physical Therapy Treatment    Patient Name:  Joey King   MRN:  [id number]    Recommendations:     Discharge Recommendations: Moderate Intensity Therapy  Discharge Equipment Recommendations: none  Barriers to discharge: Decreased caregiver support    Assessment:     Joey King is a 83 y.o. male admitted with a medical diagnosis of Weakness.  He presents with the following impairments/functional limitations: weakness, impaired endurance, impaired self care skills, impaired functional mobility, gait instability, impaired balance, decreased lower extremity function Patient was willing to participate with therapy this morning.     Rehab Prognosis: Good; patient would benefit from acute skilled PT services to address these deficits and reach maximum level of function.    Recent Surgery: * No surgery found *      Plan:     During this hospitalization, patient to be seen 5 x/week to address the identified rehab impairments via gait training, therapeutic activities, therapeutic exercises, neuromuscular re-education and progress toward the following goals:    Plan of Care Expires:  11/05/23    Subjective     Chief Complaint: weakness  Patient/Family Comments/goals: return home   Pain/Comfort:  Pain Rating 1: 0/10  Pain Rating Post-Intervention 1: 0/10      Objective:     Communicated with PT prior to session.  Patient found supine with   upon PT entry to room.     General Precautions: Standard, fall  Orthopedic Precautions: N/A  Braces: N/A  Respiratory Status: Room air     Functional Mobility:  Bed Mobility:     Supine to Sit: modified independence  Transfers:     Sit to Stand:  modified independence with rolling walker  Bed to Chair: modified independence with  rolling walker  using  Stand Pivot and Step Transfer  Gait: Patient ambulated 40' with RW, CGA, and no LOB.      AM-PAC 6 CLICK MOBILITY  Turning over in bed (including adjusting bedclothes, sheets and blankets)?: 4  Sitting down on and standing  up from a chair with arms (e.g., wheelchair, bedside commode, etc.): 4  Moving from lying on back to sitting on the side of the bed?: 3  Moving to and from a bed to a chair (including a wheelchair)?: 3  Need to walk in hospital room?: 3  Climbing 3-5 steps with a railing?: 2  Basic Mobility Total Score: 19       Treatment & Education:  Transfers and ambulation as listed above.   Sitting balance exercises for core strength while sitting EOB prior to ambulation.      Patient left up in chair with call button in reach..    GOALS:   Multidisciplinary Problems       Physical Therapy Goals          Problem: Physical Therapy    Goal Priority Disciplines Outcome Goal Variances Interventions   Physical Therapy Goal     PT, PT/OT Ongoing, Progressing     Description: Goals to be met by: 11/5/2023     Patient will increase functional independence with mobility by performing:    STG: Supine to sit with Stand-by Assistance  STG: Sit to supine with Stand-by Assistance  LTG: Sit to stand transfer with Modified [place]  LTG: Bed to chair transfer with Modified [place] using Rolling Walker  LTG: Gait  x 200 feet with Modified [place] using Rolling Walker.   LTG: Stand for 10 minutes with Modified [place] using Rolling Walker                         Time Tracking:     PT Received On: 10/27/23  PT Start Time: 0935     PT Stop Time: 1005  PT Total Time (min): 30 min     Billable Minutes: Gait Training 15 and Therapeutic Exercise 15    Treatment Type: Treatment  PT/PTA: PTA     Number of PTA visits since last PT visit: 1   JO Vann  10/27/2023

## 2023-10-27 NOTE — PT/OT/SLP PROGRESS
Occupational Therapy   Treatment    Name: Joey King  MRN: [id number]  Admitting Diagnosis:  Weakness       Recommendations:     Discharge Recommendations: Moderate Intensity Therapy  Discharge Equipment Recommendations:  none  Barriers to discharge:  None    Assessment:     Joey King is a 83 y.o. male with a medical diagnosis of Weakness.  He presents with weakness and decline with ADLs. Performance deficits affecting function are weakness, impaired endurance, impaired self care skills, impaired functional mobility, impaired balance, decreased lower extremity function, decreased safety awareness.     Rehab Prognosis:  Fair; patient would benefit from acute skilled OT services to address these deficits and reach maximum level of function.       Plan:     Patient to be seen 5 x/week to address the above listed problems via therapeutic exercises  Plan of Care Expires:    Plan of Care Reviewed with: patient    Subjective     Chief Complaint: Weakness  Patient/Family Comments/goals: to return to prior level of function  Pain/Comfort:       Objective:     Communicated with: Nurse prior to session.  Patient found supine with bed alarm upon OT entry to room.    General Precautions: Standard, fall    Orthopedic Precautions:N/A  Braces: N/A  Respiratory Status: Room air     Occupational Performance:     Bed Mobility:    Patient sitting up in a chair upon arrival.     Functional Mobility/Transfers:  Not tested    Activities of Daily Living:  Not tested      [facility] 6 Click ADL:      Treatment & Education:  Pt performed B UE strengthening exercises to include:   Shoulder flexion   Chest press    Elbow flexion   Elbow extension   Pectoral stretches   Bilateral rowing  All exercises performed 3x10 reps using 2# dowel and red theraband.     Patient left up in chair with all lines intact and call button in reach    GOALS:   Multidisciplinary Problems       Occupational Therapy Goals          Problem:  Occupational Therapy    Goal Priority Disciplines Outcome Interventions   Occupational Therapy Goal     OT, PT/OT Ongoing, Progressing    Description: Grooming Status:   Short Term Goal: Pt will perform grooming with SBA sitting EOB.   Long Term Goal: Pt will perform grooming/oral hygiene standing at sink with SBA      LE dressing Status:   Short Term Goal: Pt will perform LE dressing with SBA.   Long Term Goal: Pt will perform LE dressing with Mod I.    Toileting Status:   Short Term Goal: Pt will perform toilet hygiene on BSC with Mod I.  Long Term Goal: Pt will perform toilet hygiene on toilet with no AE with Mod I.    Commode Transfer:   Short Term Goal: Pt will perform BSC t/f with Mod I.  Long Term Goal:  Pt will perform toilet t/f in bathroom with Mod I.     Bathing Status:   Long Term Goal: Pt will perform sponge bath with Mod I with no unsafe fatigue.     Strength Status: 5/5 BUEs  Long Term Goal: Pt to perform BUE strengthening with weights and/or body weight to increase ADL independence and safety    Endurance Status:   Short Term Goal:pt to perform 15 min OT treatment with 5 or greater rest breaks  Long Term Goal: pt to perform 30 min OT treat with 3 or less rest breaks                          Time Tracking:     OT Date of Treatment: 10/26/23  OT Start Time: 0951  OT Stop Time: 1015  OT Total Time (min): 24 min    Billable Minutes:Therapeutic Exercise 24 minutes      KAREN Varela/L, CSRS  OT/ALBA: OT          10/27/2023

## 2023-10-28 LAB
BASOPHILS # BLD AUTO: 0.03 K/UL (ref 0–0.2)
BASOPHILS NFR BLD AUTO: 0.5 % (ref 0–1)
DIFFERENTIAL METHOD BLD: ABNORMAL
EOSINOPHIL # BLD AUTO: 0.02 K/UL (ref 0–0.5)
EOSINOPHIL NFR BLD AUTO: 0.4 % (ref 1–4)
ERYTHROCYTE [DISTWIDTH] IN BLOOD BY AUTOMATED COUNT: 14.1 % (ref 11.5–14.5)
HCT VFR BLD AUTO: 37.6 % (ref 40–54)
HGB BLD-MCNC: 12.4 G/DL (ref 13.5–18)
LYMPHOCYTES # BLD AUTO: 0.94 K/UL (ref 1–4.8)
LYMPHOCYTES NFR BLD AUTO: 16.9 % (ref 27–41)
MCH RBC QN AUTO: 31.2 PG (ref 27–31)
MCHC RBC AUTO-ENTMCNC: 33 G/DL (ref 32–36)
MCV RBC AUTO: 94.5 FL (ref 80–96)
MONOCYTES # BLD AUTO: 0.7 K/UL (ref 0–0.8)
MONOCYTES NFR BLD AUTO: 12.6 % (ref 2–6)
MPC BLD CALC-MCNC: 8.6 FL (ref 9.4–12.4)
NEUTROPHILS # BLD AUTO: 3.88 K/UL (ref 1.8–7.7)
NEUTROPHILS NFR BLD AUTO: 69.6 % (ref 53–65)
PLATELET # BLD AUTO: 267 K/UL (ref 150–400)
RBC # BLD AUTO: 3.98 M/UL (ref 4.6–6.2)
WBC # BLD AUTO: 5.57 K/UL (ref 4.5–11)

## 2023-10-28 PROCEDURE — 27000982 HC MATTRESS, MATRIX LAL RENTAL

## 2023-10-28 PROCEDURE — 93010 EKG 12-LEAD: ICD-10-PCS | Mod: ,,, | Performed by: HOSPITALIST

## 2023-10-28 PROCEDURE — 27000944

## 2023-10-28 PROCEDURE — 25000003 PHARM REV CODE 250: Performed by: NURSE PRACTITIONER

## 2023-10-28 PROCEDURE — 93010 ELECTROCARDIOGRAM REPORT: CPT | Mod: ,,, | Performed by: HOSPITALIST

## 2023-10-28 PROCEDURE — 85025 COMPLETE CBC W/AUTO DIFF WBC: CPT | Performed by: NURSE PRACTITIONER

## 2023-10-28 PROCEDURE — 94761 N-INVAS EAR/PLS OXIMETRY MLT: CPT

## 2023-10-28 PROCEDURE — 11000004 HC SNF PRIVATE

## 2023-10-28 PROCEDURE — 80053 COMPREHEN METABOLIC PANEL: CPT | Performed by: NURSE PRACTITIONER

## 2023-10-28 PROCEDURE — 84484 ASSAY OF TROPONIN QUANT: CPT | Performed by: NURSE PRACTITIONER

## 2023-10-28 PROCEDURE — 93005 ELECTROCARDIOGRAM TRACING: CPT

## 2023-10-28 RX ADMIN — SUCRALFATE 1 G: 1 TABLET ORAL at 04:10

## 2023-10-28 RX ADMIN — SENNOSIDES AND DOCUSATE SODIUM 1 TABLET: 50; 8.6 TABLET ORAL at 09:10

## 2023-10-28 RX ADMIN — LEVOTHYROXINE SODIUM 100 MCG: 0.1 TABLET ORAL at 06:10

## 2023-10-28 RX ADMIN — FLECAINIDE ACETATE 150 MG: 50 TABLET ORAL at 09:10

## 2023-10-28 RX ADMIN — APIXABAN 5 MG: 2.5 TABLET, FILM COATED ORAL at 08:10

## 2023-10-28 RX ADMIN — ARIPIPRAZOLE 5 MG: 5 TABLET ORAL at 09:10

## 2023-10-28 RX ADMIN — SUCRALFATE 1 G: 1 TABLET ORAL at 08:10

## 2023-10-28 RX ADMIN — PANTOPRAZOLE SODIUM 40 MG: 40 TABLET, DELAYED RELEASE ORAL at 09:10

## 2023-10-28 RX ADMIN — CYANOCOBALAMIN TAB 500 MCG 1000 MCG: 500 TAB at 09:10

## 2023-10-28 RX ADMIN — SENNOSIDES AND DOCUSATE SODIUM 1 TABLET: 50; 8.6 TABLET ORAL at 08:10

## 2023-10-28 RX ADMIN — CHOLECALCIFEROL TAB 25 MCG (1000 UNIT) 1000 UNITS: 25 TAB at 09:10

## 2023-10-28 RX ADMIN — Medication 100 MG: at 09:10

## 2023-10-28 RX ADMIN — FLECAINIDE ACETATE 150 MG: 50 TABLET ORAL at 08:10

## 2023-10-28 RX ADMIN — POLYETHYLENE GLYCOL 3350 17 G: 17 POWDER, FOR SOLUTION ORAL at 09:10

## 2023-10-28 RX ADMIN — ONDANSETRON 4 MG: 4 TABLET, ORALLY DISINTEGRATING ORAL at 08:10

## 2023-10-28 RX ADMIN — OXYBUTYNIN CHLORIDE 15 MG: 5 TABLET, EXTENDED RELEASE ORAL at 09:10

## 2023-10-28 RX ADMIN — CITALOPRAM HYDROBROMIDE 40 MG: 20 TABLET ORAL at 09:10

## 2023-10-28 RX ADMIN — SUCRALFATE 1 G: 1 TABLET ORAL at 06:10

## 2023-10-28 RX ADMIN — APIXABAN 5 MG: 2.5 TABLET, FILM COATED ORAL at 09:10

## 2023-10-28 RX ADMIN — OMEGA-3 FATTY ACIDS CAP 1000 MG 1 CAPSULE: 1000 CAP at 09:10

## 2023-10-28 RX ADMIN — THERA TABS 1 TABLET: TAB at 09:10

## 2023-10-28 RX ADMIN — SUCRALFATE 1 G: 1 TABLET ORAL at 11:10

## 2023-10-28 RX ADMIN — Medication 400 UNITS: at 09:10

## 2023-10-28 RX ADMIN — CLONIDINE HYDROCHLORIDE 0.1 MG: 0.1 TABLET ORAL at 04:10

## 2023-10-28 RX ADMIN — ASPIRIN 81 MG: 81 TABLET, COATED ORAL at 08:10

## 2023-10-28 NOTE — PLAN OF CARE
Problem: Adult Inpatient Plan of Care  Goal: Plan of Care Review  Outcome: Ongoing, Progressing  Goal: Patient-Specific Goal (Individualized)  Outcome: Ongoing, Progressing     Problem: Skin Injury Risk Increased  Goal: Skin Health and Integrity  Outcome: Ongoing, Progressing  Intervention: Promote and Optimize Oral Intake  Flowsheets (Taken 10/28/2023 0440)  Oral Nutrition Promotion:   rest periods promoted   safe use of adaptive equipment encouraged     Problem: Fall Injury Risk  Goal: Absence of Fall and Fall-Related Injury  Outcome: Ongoing, Progressing  Intervention: Identify and Manage Contributors  Flowsheets (Taken 10/28/2023 0440)  Self-Care Promotion:   independence encouraged   BADL personal objects within reach  Medication Review/Management: medications reviewed  Intervention: Promote Injury-Free Environment  Flowsheets (Taken 10/28/2023 0440)  Safety Promotion/Fall Prevention:   assistive device/personal item within reach   instructed to call staff for mobility   side rails raised x 2

## 2023-10-29 ENCOUNTER — ANESTHESIA (OUTPATIENT)
Dept: SURGERY | Facility: HOSPITAL | Age: 83
DRG: 326 | End: 2023-10-29
Payer: MEDICARE

## 2023-10-29 ENCOUNTER — ANESTHESIA EVENT (OUTPATIENT)
Dept: SURGERY | Facility: HOSPITAL | Age: 83
DRG: 326 | End: 2023-10-29
Payer: MEDICARE

## 2023-10-29 ENCOUNTER — HOSPITAL ENCOUNTER (INPATIENT)
Facility: HOSPITAL | Age: 83
LOS: 1 days | Discharge: NURSING, REHAB OR LONG-TERM CARE | DRG: 326 | End: 2023-10-30
Attending: INTERNAL MEDICINE | Admitting: INTERNAL MEDICINE
Payer: MEDICARE

## 2023-10-29 VITALS
OXYGEN SATURATION: 95 % | DIASTOLIC BLOOD PRESSURE: 79 MMHG | RESPIRATION RATE: 20 BRPM | BODY MASS INDEX: 23.45 KG/M2 | SYSTOLIC BLOOD PRESSURE: 184 MMHG | HEIGHT: 71 IN | WEIGHT: 167.5 LBS | TEMPERATURE: 98 F | HEART RATE: 100 BPM

## 2023-10-29 DIAGNOSIS — I48.91 ATRIAL FIBRILLATION: ICD-10-CM

## 2023-10-29 DIAGNOSIS — K31.89 GASTRIC VOLVULUS: ICD-10-CM

## 2023-10-29 DIAGNOSIS — R07.9 CHEST PAIN: ICD-10-CM

## 2023-10-29 DIAGNOSIS — R79.89 TROPONIN I ABOVE REFERENCE RANGE: Primary | ICD-10-CM

## 2023-10-29 PROBLEM — R53.1 GENERALIZED WEAKNESS: Status: ACTIVE | Noted: 2023-10-29

## 2023-10-29 PROBLEM — K22.6 MALLORY-WEISS TEAR: Status: ACTIVE | Noted: 2023-10-29

## 2023-10-29 PROBLEM — E87.6 HYPOKALEMIA: Status: ACTIVE | Noted: 2023-10-29

## 2023-10-29 LAB
ABORH RETYPE: NORMAL
ALBUMIN SERPL BCP-MCNC: 2.9 G/DL (ref 3.5–5)
ALBUMIN/GLOB SERPL: 0.7 {RATIO}
ALP SERPL-CCNC: 145 U/L (ref 45–115)
ALT SERPL W P-5'-P-CCNC: 17 U/L (ref 16–61)
ANION GAP SERPL CALCULATED.3IONS-SCNC: 10 MMOL/L (ref 7–16)
AST SERPL W P-5'-P-CCNC: 23 U/L (ref 15–37)
BILIRUB SERPL-MCNC: 0.6 MG/DL (ref ?–1.2)
BUN SERPL-MCNC: 9 MG/DL (ref 7–18)
BUN/CREAT SERPL: 8 (ref 6–20)
CALCIUM SERPL-MCNC: 9.3 MG/DL (ref 8.5–10.1)
CHLORIDE SERPL-SCNC: 100 MMOL/L (ref 98–107)
CO2 SERPL-SCNC: 29 MMOL/L (ref 21–32)
CREAT SERPL-MCNC: 1.08 MG/DL (ref 0.7–1.3)
EGFR (NO RACE VARIABLE) (RUSH/TITUS): 68 ML/MIN/1.73M2
GLOBULIN SER-MCNC: 3.9 G/DL (ref 2–4)
GLUCOSE SERPL-MCNC: 119 MG/DL (ref 74–106)
INDIRECT COOMBS: NORMAL
POTASSIUM SERPL-SCNC: 3.2 MMOL/L (ref 3.5–5.1)
PROT SERPL-MCNC: 6.8 G/DL (ref 6.4–8.2)
RH BLD: NORMAL
SODIUM SERPL-SCNC: 136 MMOL/L (ref 136–145)
SPECIMEN OUTDATE: NORMAL
TROPONIN I SERPL DL<=0.01 NG/ML-MCNC: 11.7 PG/ML
TROPONIN I SERPL DL<=0.01 NG/ML-MCNC: 148.1 PG/ML
TROPONIN I SERPL DL<=0.01 NG/ML-MCNC: 31.3 PG/ML
TROPONIN I SERPL DL<=0.01 NG/ML-MCNC: 99 PG/ML

## 2023-10-29 PROCEDURE — 36000713 HC OR TIME LEV V EA ADD 15 MIN: Performed by: SURGERY

## 2023-10-29 PROCEDURE — 20000000 HC ICU ROOM

## 2023-10-29 PROCEDURE — 63600175 PHARM REV CODE 636 W HCPCS: Performed by: SURGERY

## 2023-10-29 PROCEDURE — 25000003 PHARM REV CODE 250: Performed by: NURSE PRACTITIONER

## 2023-10-29 PROCEDURE — 99223 1ST HOSP IP/OBS HIGH 75: CPT | Mod: ,,, | Performed by: STUDENT IN AN ORGANIZED HEALTH CARE EDUCATION/TRAINING PROGRAM

## 2023-10-29 PROCEDURE — 25500020 PHARM REV CODE 255: Performed by: FAMILY MEDICINE

## 2023-10-29 PROCEDURE — 99223 1ST HOSP IP/OBS HIGH 75: CPT | Mod: ,,, | Performed by: INTERNAL MEDICINE

## 2023-10-29 PROCEDURE — 27201423 OPTIME MED/SURG SUP & DEVICES STERILE SUPPLY: Performed by: SURGERY

## 2023-10-29 PROCEDURE — 27000509 HC TUBE SALEM SUMP ANY SIZE: Performed by: ANESTHESIOLOGY

## 2023-10-29 PROCEDURE — D9220A PRA ANESTHESIA: Mod: CRNA,,,

## 2023-10-29 PROCEDURE — 93005 ELECTROCARDIOGRAM TRACING: CPT

## 2023-10-29 PROCEDURE — 93010 EKG 12-LEAD: ICD-10-PCS | Mod: ,,, | Performed by: STUDENT IN AN ORGANIZED HEALTH CARE EDUCATION/TRAINING PROGRAM

## 2023-10-29 PROCEDURE — 27000655: Performed by: ANESTHESIOLOGY

## 2023-10-29 PROCEDURE — 43281 LAP PARAESOPHAG HERN REPAIR: CPT | Mod: ,,, | Performed by: SURGERY

## 2023-10-29 PROCEDURE — 63600175 PHARM REV CODE 636 W HCPCS: Performed by: NURSE PRACTITIONER

## 2023-10-29 PROCEDURE — 25500020 PHARM REV CODE 255: Performed by: INTERNAL MEDICINE

## 2023-10-29 PROCEDURE — 25000003 PHARM REV CODE 250: Performed by: INTERNAL MEDICINE

## 2023-10-29 PROCEDURE — 63600175 PHARM REV CODE 636 W HCPCS

## 2023-10-29 PROCEDURE — 87040 BLOOD CULTURE FOR BACTERIA: CPT | Performed by: INTERNAL MEDICINE

## 2023-10-29 PROCEDURE — 27000716 HC OXISENSOR PROBE, ANY SIZE: Performed by: ANESTHESIOLOGY

## 2023-10-29 PROCEDURE — 27000165 HC TUBE, ETT CUFFED: Performed by: ANESTHESIOLOGY

## 2023-10-29 PROCEDURE — D9220A PRA ANESTHESIA: ICD-10-PCS | Mod: CRNA,,,

## 2023-10-29 PROCEDURE — 93010 ELECTROCARDIOGRAM REPORT: CPT | Mod: ,,, | Performed by: STUDENT IN AN ORGANIZED HEALTH CARE EDUCATION/TRAINING PROGRAM

## 2023-10-29 PROCEDURE — 63600175 PHARM REV CODE 636 W HCPCS: Performed by: INTERNAL MEDICINE

## 2023-10-29 PROCEDURE — 84484 ASSAY OF TROPONIN QUANT: CPT | Performed by: INTERNAL MEDICINE

## 2023-10-29 PROCEDURE — 99223 PR INITIAL HOSPITAL CARE,LEVL III: ICD-10-PCS | Mod: ,,, | Performed by: STUDENT IN AN ORGANIZED HEALTH CARE EDUCATION/TRAINING PROGRAM

## 2023-10-29 PROCEDURE — 37000008 HC ANESTHESIA 1ST 15 MINUTES: Performed by: SURGERY

## 2023-10-29 PROCEDURE — 36000712 HC OR TIME LEV V 1ST 15 MIN: Performed by: SURGERY

## 2023-10-29 PROCEDURE — 99223 PR INITIAL HOSPITAL CARE,LEVL III: ICD-10-PCS | Mod: ,,, | Performed by: INTERNAL MEDICINE

## 2023-10-29 PROCEDURE — 94761 N-INVAS EAR/PLS OXIMETRY MLT: CPT

## 2023-10-29 PROCEDURE — 27000689 HC BLADE LARYNGOSCOPE ANY SIZE: Performed by: ANESTHESIOLOGY

## 2023-10-29 PROCEDURE — D9220A PRA ANESTHESIA: ICD-10-PCS | Mod: ANES,,, | Performed by: ANESTHESIOLOGY

## 2023-10-29 PROCEDURE — 25000003 PHARM REV CODE 250

## 2023-10-29 PROCEDURE — 27202344 HC EYESHIELD: Performed by: ANESTHESIOLOGY

## 2023-10-29 PROCEDURE — 84484 ASSAY OF TROPONIN QUANT: CPT | Performed by: NURSE PRACTITIONER

## 2023-10-29 PROCEDURE — 86850 RBC ANTIBODY SCREEN: CPT | Performed by: INTERNAL MEDICINE

## 2023-10-29 PROCEDURE — 37000009 HC ANESTHESIA EA ADD 15 MINS: Performed by: SURGERY

## 2023-10-29 PROCEDURE — 27000510 HC BLANKET BAIR HUGGER ANY SIZE: Performed by: ANESTHESIOLOGY

## 2023-10-29 PROCEDURE — 43281 PR LAP, REPAIR PARAESOPHAGEAL HERNIA, INCL FUNDOPLASTY W/O MESH: ICD-10-PCS | Mod: ,,, | Performed by: SURGERY

## 2023-10-29 PROCEDURE — D9220A PRA ANESTHESIA: Mod: ANES,,, | Performed by: ANESTHESIOLOGY

## 2023-10-29 RX ORDER — BUPIVACAINE HYDROCHLORIDE 2.5 MG/ML
INJECTION, SOLUTION EPIDURAL; INFILTRATION; INTRACAUDAL
Status: DISCONTINUED | OUTPATIENT
Start: 2023-10-29 | End: 2023-10-29 | Stop reason: HOSPADM

## 2023-10-29 RX ORDER — ACETAMINOPHEN 10 MG/ML
1000 INJECTION, SOLUTION INTRAVENOUS ONCE
Status: COMPLETED | OUTPATIENT
Start: 2023-10-29 | End: 2023-10-29

## 2023-10-29 RX ORDER — POTASSIUM CHLORIDE 7.45 MG/ML
10 INJECTION INTRAVENOUS ONCE
Status: COMPLETED | OUTPATIENT
Start: 2023-10-29 | End: 2023-10-29

## 2023-10-29 RX ORDER — SODIUM CHLORIDE 0.9 % (FLUSH) 0.9 %
10 SYRINGE (ML) INJECTION
Status: DISCONTINUED | OUTPATIENT
Start: 2023-10-29 | End: 2023-10-30 | Stop reason: HOSPADM

## 2023-10-29 RX ORDER — TALC
6 POWDER (GRAM) TOPICAL NIGHTLY PRN
Status: DISCONTINUED | OUTPATIENT
Start: 2023-10-29 | End: 2023-10-30 | Stop reason: HOSPADM

## 2023-10-29 RX ORDER — HYDRALAZINE HYDROCHLORIDE 20 MG/ML
5 INJECTION INTRAMUSCULAR; INTRAVENOUS EVERY 6 HOURS PRN
Status: DISCONTINUED | OUTPATIENT
Start: 2023-10-29 | End: 2023-10-30 | Stop reason: HOSPADM

## 2023-10-29 RX ORDER — ONDANSETRON 2 MG/ML
INJECTION INTRAMUSCULAR; INTRAVENOUS
Status: DISCONTINUED | OUTPATIENT
Start: 2023-10-29 | End: 2023-10-29

## 2023-10-29 RX ORDER — CEFAZOLIN SODIUM 1 G/3ML
INJECTION, POWDER, FOR SOLUTION INTRAMUSCULAR; INTRAVENOUS
Status: DISCONTINUED | OUTPATIENT
Start: 2023-10-29 | End: 2023-10-29

## 2023-10-29 RX ORDER — ONDANSETRON 4 MG/1
4 TABLET, ORALLY DISINTEGRATING ORAL EVERY 8 HOURS PRN
Status: DISCONTINUED | OUTPATIENT
Start: 2023-10-29 | End: 2023-10-30 | Stop reason: HOSPADM

## 2023-10-29 RX ORDER — PROPOFOL 10 MG/ML
VIAL (ML) INTRAVENOUS
Status: DISCONTINUED | OUTPATIENT
Start: 2023-10-29 | End: 2023-10-29

## 2023-10-29 RX ORDER — MORPHINE SULFATE 8 MG/ML
INJECTION INTRAMUSCULAR; INTRAVENOUS; SUBCUTANEOUS
Status: DISCONTINUED | OUTPATIENT
Start: 2023-10-29 | End: 2023-10-29

## 2023-10-29 RX ORDER — PANTOPRAZOLE SODIUM 40 MG/10ML
40 INJECTION, POWDER, LYOPHILIZED, FOR SOLUTION INTRAVENOUS DAILY
Status: DISCONTINUED | OUTPATIENT
Start: 2023-10-29 | End: 2023-10-30 | Stop reason: HOSPADM

## 2023-10-29 RX ORDER — MUPIROCIN 20 MG/G
OINTMENT TOPICAL 2 TIMES DAILY
Status: DISCONTINUED | OUTPATIENT
Start: 2023-10-29 | End: 2023-10-30 | Stop reason: HOSPADM

## 2023-10-29 RX ORDER — LIDOCAINE HYDROCHLORIDE 40 MG/ML
SOLUTION TOPICAL
Status: DISCONTINUED | OUTPATIENT
Start: 2023-10-29 | End: 2023-10-29

## 2023-10-29 RX ORDER — FENTANYL CITRATE 50 UG/ML
INJECTION, SOLUTION INTRAMUSCULAR; INTRAVENOUS
Status: DISCONTINUED | OUTPATIENT
Start: 2023-10-29 | End: 2023-10-29

## 2023-10-29 RX ORDER — MORPHINE SULFATE 4 MG/ML
4 INJECTION, SOLUTION INTRAMUSCULAR; INTRAVENOUS EVERY 4 HOURS PRN
Status: DISCONTINUED | OUTPATIENT
Start: 2023-10-29 | End: 2023-10-30 | Stop reason: HOSPADM

## 2023-10-29 RX ORDER — SODIUM CHLORIDE 450 MG/100ML
INJECTION, SOLUTION INTRAVENOUS CONTINUOUS
Status: DISCONTINUED | OUTPATIENT
Start: 2023-10-29 | End: 2023-10-30 | Stop reason: HOSPADM

## 2023-10-29 RX ORDER — FLECAINIDE ACETATE 50 MG/1
150 TABLET ORAL EVERY 12 HOURS
Status: DISCONTINUED | OUTPATIENT
Start: 2023-10-29 | End: 2023-10-30 | Stop reason: HOSPADM

## 2023-10-29 RX ORDER — PHENYLEPHRINE HYDROCHLORIDE 10 MG/ML
INJECTION INTRAVENOUS
Status: DISCONTINUED | OUTPATIENT
Start: 2023-10-29 | End: 2023-10-29

## 2023-10-29 RX ORDER — MORPHINE SULFATE 4 MG/ML
4 INJECTION, SOLUTION INTRAMUSCULAR; INTRAVENOUS EVERY 4 HOURS PRN
Status: DISCONTINUED | OUTPATIENT
Start: 2023-10-29 | End: 2023-10-29 | Stop reason: HOSPADM

## 2023-10-29 RX ORDER — IBUPROFEN 200 MG
1 TABLET ORAL DAILY PRN
Status: DISCONTINUED | OUTPATIENT
Start: 2023-10-29 | End: 2023-10-30 | Stop reason: HOSPADM

## 2023-10-29 RX ORDER — DEXAMETHASONE SODIUM PHOSPHATE 4 MG/ML
INJECTION, SOLUTION INTRA-ARTICULAR; INTRALESIONAL; INTRAMUSCULAR; INTRAVENOUS; SOFT TISSUE
Status: DISCONTINUED | OUTPATIENT
Start: 2023-10-29 | End: 2023-10-29

## 2023-10-29 RX ORDER — LIDOCAINE HYDROCHLORIDE 20 MG/ML
INJECTION, SOLUTION EPIDURAL; INFILTRATION; INTRACAUDAL; PERINEURAL
Status: DISCONTINUED | OUTPATIENT
Start: 2023-10-29 | End: 2023-10-29

## 2023-10-29 RX ORDER — LEVOTHYROXINE SODIUM 100 UG/1
100 TABLET ORAL
Status: DISCONTINUED | OUTPATIENT
Start: 2023-10-30 | End: 2023-10-30 | Stop reason: HOSPADM

## 2023-10-29 RX ORDER — UBIDECARENONE 75 MG
1000 CAPSULE ORAL DAILY
Status: DISCONTINUED | OUTPATIENT
Start: 2023-10-29 | End: 2023-10-30 | Stop reason: HOSPADM

## 2023-10-29 RX ORDER — LORAZEPAM 0.5 MG/1
0.5 TABLET ORAL EVERY 12 HOURS PRN
Status: DISCONTINUED | OUTPATIENT
Start: 2023-10-29 | End: 2023-10-30 | Stop reason: HOSPADM

## 2023-10-29 RX ORDER — ROCURONIUM BROMIDE 10 MG/ML
INJECTION, SOLUTION INTRAVENOUS
Status: DISCONTINUED | OUTPATIENT
Start: 2023-10-29 | End: 2023-10-29

## 2023-10-29 RX ORDER — MORPHINE SULFATE 2 MG/ML
2 INJECTION, SOLUTION INTRAMUSCULAR; INTRAVENOUS ONCE
Status: COMPLETED | OUTPATIENT
Start: 2023-10-29 | End: 2023-10-29

## 2023-10-29 RX ORDER — CITALOPRAM 20 MG/1
40 TABLET, FILM COATED ORAL DAILY
Status: DISCONTINUED | OUTPATIENT
Start: 2023-10-29 | End: 2023-10-30 | Stop reason: HOSPADM

## 2023-10-29 RX ORDER — ASPIRIN 81 MG/1
81 TABLET ORAL NIGHTLY
Status: DISCONTINUED | OUTPATIENT
Start: 2023-10-29 | End: 2023-10-30 | Stop reason: HOSPADM

## 2023-10-29 RX ORDER — ARIPIPRAZOLE 5 MG/1
5 TABLET ORAL DAILY
Status: DISCONTINUED | OUTPATIENT
Start: 2023-10-29 | End: 2023-10-30 | Stop reason: HOSPADM

## 2023-10-29 RX ADMIN — PHENYLEPHRINE HYDROCHLORIDE 200 MCG: 10 INJECTION INTRAVENOUS at 09:10

## 2023-10-29 RX ADMIN — MUPIROCIN: 20 OINTMENT TOPICAL at 09:10

## 2023-10-29 RX ADMIN — PHENYLEPHRINE HYDROCHLORIDE 300 MCG: 10 INJECTION INTRAVENOUS at 09:10

## 2023-10-29 RX ADMIN — FENTANYL CITRATE 50 MCG: 50 INJECTION INTRAMUSCULAR; INTRAVENOUS at 09:10

## 2023-10-29 RX ADMIN — PHENYLEPHRINE HYDROCHLORIDE 100 MCG: 10 INJECTION INTRAVENOUS at 09:10

## 2023-10-29 RX ADMIN — SUGAMMADEX 200 MG: 100 INJECTION, SOLUTION INTRAVENOUS at 10:10

## 2023-10-29 RX ADMIN — ROCURONIUM BROMIDE 50 MG: 10 INJECTION, SOLUTION INTRAVENOUS at 09:10

## 2023-10-29 RX ADMIN — MORPHINE SULFATE 4 MG: 4 INJECTION, SOLUTION INTRAMUSCULAR; INTRAVENOUS at 01:10

## 2023-10-29 RX ADMIN — SODIUM CHLORIDE, POTASSIUM CHLORIDE, SODIUM LACTATE AND CALCIUM CHLORIDE: 600; 310; 30; 20 INJECTION, SOLUTION INTRAVENOUS at 10:10

## 2023-10-29 RX ADMIN — SODIUM CHLORIDE: 4.5 INJECTION, SOLUTION INTRAVENOUS at 11:10

## 2023-10-29 RX ADMIN — PHENYLEPHRINE HYDROCHLORIDE 100 MCG: 10 INJECTION INTRAVENOUS at 10:10

## 2023-10-29 RX ADMIN — MORPHINE SULFATE 4 MG: 8 INJECTION INTRAVENOUS at 10:10

## 2023-10-29 RX ADMIN — PROMETHAZINE HYDROCHLORIDE 6.25 MG: 25 INJECTION INTRAMUSCULAR; INTRAVENOUS at 01:10

## 2023-10-29 RX ADMIN — HYDRALAZINE HYDROCHLORIDE 5 MG: 20 INJECTION INTRAMUSCULAR; INTRAVENOUS at 12:10

## 2023-10-29 RX ADMIN — ASPIRIN 81 MG: 81 TABLET, COATED ORAL at 09:10

## 2023-10-29 RX ADMIN — FENTANYL CITRATE 25 MCG: 50 INJECTION INTRAMUSCULAR; INTRAVENOUS at 09:10

## 2023-10-29 RX ADMIN — CALCIUM CHLORIDE 0.5 G: 100 INJECTION, SOLUTION INTRAVENOUS at 09:10

## 2023-10-29 RX ADMIN — IOPAMIDOL 100 ML: 755 INJECTION, SOLUTION INTRAVENOUS at 02:10

## 2023-10-29 RX ADMIN — DEXAMETHASONE SODIUM PHOSPHATE 4 MG: 4 INJECTION, SOLUTION INTRA-ARTICULAR; INTRALESIONAL; INTRAMUSCULAR; INTRAVENOUS; SOFT TISSUE at 09:10

## 2023-10-29 RX ADMIN — POTASSIUM CHLORIDE 10 MEQ: 7.46 INJECTION, SOLUTION INTRAVENOUS at 01:10

## 2023-10-29 RX ADMIN — MORPHINE SULFATE 2 MG: 2 INJECTION, SOLUTION INTRAMUSCULAR; INTRAVENOUS at 07:10

## 2023-10-29 RX ADMIN — LIDOCAINE HYDROCHLORIDE 60 MG: 20 INJECTION, SOLUTION INTRAVENOUS at 09:10

## 2023-10-29 RX ADMIN — FENTANYL CITRATE 25 MCG: 50 INJECTION INTRAMUSCULAR; INTRAVENOUS at 08:10

## 2023-10-29 RX ADMIN — PROPOFOL 150 MG: 10 INJECTION, EMULSION INTRAVENOUS at 09:10

## 2023-10-29 RX ADMIN — ONDANSETRON 4 MG: 2 INJECTION INTRAMUSCULAR; INTRAVENOUS at 09:10

## 2023-10-29 RX ADMIN — SODIUM CHLORIDE, POTASSIUM CHLORIDE, SODIUM LACTATE AND CALCIUM CHLORIDE: 600; 310; 30; 20 INJECTION, SOLUTION INTRAVENOUS at 08:10

## 2023-10-29 RX ADMIN — LIDOCAINE HYDROCHLORIDE 100 MG: 40 SOLUTION TOPICAL at 09:10

## 2023-10-29 RX ADMIN — FLECAINIDE ACETATE 150 MG: 50 TABLET ORAL at 09:10

## 2023-10-29 RX ADMIN — CEFAZOLIN 2 G: 1 INJECTION, POWDER, FOR SOLUTION INTRAMUSCULAR; INTRAVENOUS; PARENTERAL at 09:10

## 2023-10-29 RX ADMIN — ACETAMINOPHEN 1000 MG: 10 INJECTION, SOLUTION INTRAVENOUS at 10:10

## 2023-10-29 RX ADMIN — PERFLUTREN 1.5 ML: 6.52 INJECTION, SUSPENSION INTRAVENOUS at 08:10

## 2023-10-29 NOTE — DISCHARGE SUMMARY
[facility] - Medical Surgical Unit  Hospital Medicine  Discharge Summary      Patient Name: Joey King  MRN: [id number]  [facility]: [id number]  Patient Class: IP- Swing  Admission Date: 10/13/2023  Hospital Length of Stay: 16 days  Discharge Date and Time:  10/29/2023 4:37 AM  Attending Physician: Len Palacios IV, DO   Discharging Provider: JERO Mejia  Primary Care Provider: Prashanth Ryan DO    Primary Care Team: Networked reference to record PCT     HPI:   Mr. Joey King is an 83 year old male with pmh of htn, afib, CHF,anxiety,depression,PE in Feb.2023, EGRD, hypothyroidism,cholecystectomy 09/23/23.He is s/p an observation stay at [facility]. EGD shows Taty Colon Tear . Had 2 hemostatic clips applied. He had no further bleeding. Records show H&H is 9/27. He has been accepted back to swing bed at [facility].     Mr. King was admitted to [facility] on 10/13/2023 for continued therapy.           * No surgery found *      Hospital Course:   Mr. King is 83 year old white male who was admitted [facility] for swing bed services for therapy.  He was received from [facility] in [place], following an admission for UGIB secondary to Taty-Colon tear.  He was seen by GI at [place] who performed EGD to confirm this diagnosis.  GI placed hemostatic clips for the bleed.  Pt is on long term use of Eliquis due to history of Afib and PE.  GI recommended holding his Eliquis for an additional week.  At that time, if there are no signs of GIB, it can be restarted.  He has no complaints voiced tonight.     0620: I was called by nursing staff after the patient had fallen.  He reports he was going to the bathroom, but was too weak.  He denies hitting his head and denies LOC.  Nursing staff reports they found him on the floor at the foot of the bed.  Pt is alert and oriented x3.  He denies pain.  FROM without pain to all 4 extremities.  Denies neck/back pain.  No obvious  signs of soft tissue injury.      10/17/23 Awake and resting in bed without complaints. Daughter at bedside. Denies any bleeding. Continue with PT and OT .     10/18/23 (00:20)  Nursing staff report one episode of vomiting--patient c/o nausea earlier tonight and was given Zofran ODT. One episode of bilious emesis reported. Will start saline lock and administer protonix and promethazine.     10/18/23 (0450) Elevated BP Reported. Continues to feel nauseated at times. Denies headache, chest pain, diaphoresis, or dizziness. Advised to administer Hydralazine 5mg IVP and monitor BP--will titrate med as needed.     10/18/23 Awake and resting in bed. Reports he started vomiting around midnight. Denies having any dark coffee ground emesis or any signs of blood. Denies abdominal pain. Denies chest pain. Does have some belching. Complains of continued nausea. Will give zofran IV  and monitor. States he did not sleep any last night.    0854 no further nausea. States he is going to try and take a nap.    0932 complains of abd and chest pain at level 8. No further nausea. Stat troponin and EKG ordered. He has had some belching this morning. Will also check KUB and give some simethicone.  Troponin is 12.9. EKG ST at 105, nonspecific ST and T wave abn.    1200 Had norco @ 1119. States pain is about a 4-5. Reports passing some gas. Repeat troponin pending.   Vomitus sent for occult blood.    1253 troponin 14- continues to have nausea. Will make npo, give fluids and treat nausea.    10/19/23 no further nausea or vomiting. Deneis chest pain. Has tolerated CL diet this morning. Will slowly advance diet as tolerated.    10/20/23 tolerating full liquids. Increased to dysphagia diet. No further reports of nausea or vomiting . No signs of bleeding . Will resume eliquis BID.    10/24/2023-patient seen this morning lying in bed with his eyes closed.  Patient is easily arousable and states that he is feeling much better.  Patient's nausea has  passed at this time.  He is still on a dysphagia diet and I do not intend to change that day.  Review of most recent laboratory evaluation shows grossly within lab reference ranges or at baseline for patient.  We will continue to monitor and treat as appropriate.  We will continue to evaluate for discharge planning.    10/29/23 RN called to report the patient was having vomiting and chest pain.  Pt reports he began with the n/v, then developed the chest pain.  He reports pain is lower anterior chest and nonradiating. Denies SOB, palpitations, lightheadedness.  Pt recently has UGIB with Taty-Colon tear.  The tear require hemostatic clipping by GIMD.  Spoke to Dr Bonner regarding the patient.  Given his symptoms and hx, will trend his troponins, get EKG and CT chest.    10/29/23 CT shows concern for developing gastric volvulus.  Pt reports his n/v has resolved, but he is still in pain.  I spoke with Dr Bonner who has accepted the patient as transfer.  I also spoke with Dr Long, who will be a consulting provider in this case.       Goals of Care Treatment Preferences:  Code Status: Full Code      Consults:   Consults (From admission, onward)        Status Ordering Provider     IP consult to case management  Once        Provider:  (Not yet assigned)    Acknowledged SEGUNDO CRAIG JR     Inpatient consult to Registered Dietitian/Nutritionist  Once        Provider:  (Not yet assigned)    Completed SUZY GIBSON          No new Assessment & Plan notes have been filed under this hospital service since the last note was generated.  Service: Hospital Medicine    Final Active Diagnoses:    Diagnosis Date Noted POA    PRINCIPAL PROBLEM:  Weakness [R53.1] 03/29/2023 Yes    Acute gastric volvulus [K31.89] 10/29/2023 Unknown    Generalized weakness [R53.1] 10/29/2023 Yes    H/O: GI bleed [Z87.19] 10/13/2023 Not Applicable    Chronic diastolic CHF (congestive heart failure) [I50.32] 10/09/2023 Yes    Anxiety [F41.9]  10/09/2023 Yes    Paroxysmal atrial fibrillation [I48.0] 10/23/2022 Yes    Essential hypertension [I10] 10/23/2022 Yes    Gastroesophageal reflux disease without esophagitis [K21.9] 10/23/2022 Yes    Hypothyroidism [E03.9] 10/23/2022 Yes    Depression [F32.A] 10/23/2022 Yes      Problems Resolved During this Admission:       Discharged Condition: stable    Disposition: Planned Readmission - *    Follow Up:   Follow-up Information     [facility] - Follow up on 2/22/2024.    Why: With Dr. Arvind Quezada @ 1pm  Contact information:  [address]  [place] [address]  [phone]                       Patient Instructions:      Transfer patient   Order Comments: Transfer patient to [facility] under Dr Bonner and Dr Long.       Significant Diagnostic Studies: Labs: All labs within the past 24 hours have been reviewed  Radiology: X-Ray: CXR: X-Ray Chest 1 View (CXR): No results found for this visit on 10/13/23.  CT scan: CT chest with IV contrast    Pending Diagnostic Studies:     Procedure Component Value Units Date/Time    CT Chest With Contrast [[id number]] Resulted: 10/29/23 0130    Order Status: Sent Lab Status: In process Updated: 10/29/23 0205    Troponin I [[id number]]     Order Status: Sent Lab Status: No result     Specimen: Blood          Medications:  Reconciled Home Medications:      Medication List      CONTINUE taking these medications    ARIPiprazole 5 MG Tab  Commonly known as: ABILIFY  1 tablet.     aspirin 81 MG EC tablet  Commonly known as: ECOTRIN  TAKE ONE TABLET BY MOUTH DAILY TO PREVENT BLOOD CLOT     citalopram 40 MG tablet  Commonly known as: CeleXA  TAKE ONE TABLET BY MOUTH DAILY FOR DEPRESSION     cloNIDine 0.1 MG tablet  Commonly known as: CATAPRES  TAKE ONE TABLET BY MOUTH DAILY FOR HIGH BLOOD PRESSURE     cyanocobalamin 100 MCG tablet  Commonly known as: VITAMIN B-12  Take 1,000 mcg by mouth.     flecainide 150 MG Tab  Commonly known as:  TAMBOCOR  Take 1 tablet (150 mg total) by mouth every 12 (twelve) hours.     levothyroxine 100 MCG tablet  Commonly known as: SYNTHROID  TAKE ONE TABLET BY MOUTH DAILY IN THE MORNING FOR THYROID REPLACEMENT     melatonin  Commonly known as: MELATIN  Take 3 mg by mouth nightly as needed.     mirabegron 50 mg Tb24  Commonly known as: MYRBETRIQ  Take 1 tablet by mouth once daily.     MULTIVITAMIN 50 PLUS ORAL     omega-3 fatty acids-fish oil 340-1,000 mg Cap  Take 1 capsule by mouth.     omeprazole 40 MG capsule  Commonly known as: PRILOSEC  40 mg.     oxybutynin 15 MG Tr24  Commonly known as: DITROPAN XL  Take 1 tablet by mouth once daily.     polyethylene glycol 17 gram/dose powder  Commonly known as: GLYCOLAX  Take 17 g by mouth.     pyridoxine (vitamin B6) 50 MG Tab  Commonly known as: B-6  Take 100 mg by mouth.     vitamin D 1000 units Tab  Commonly known as: VITAMIN D3  TAKE ONE TABLET BY MOUTH DAILY (CHOLECALCIFEROL SAME AS VITAMIN D)     vitamin E 400 UNIT capsule  1 capsule.     zinc 50 mg Tab  1 tablet.     zolpidem 10 mg Tab  Commonly known as: AMBIEN  Take 10 mg by mouth.            Indwelling Lines/Drains at time of discharge:   Lines/Drains/Airways     None                 Time spent on the discharge of patient: 30 minutes         JERO Mejia  Department of Hospital Medicine  [facility] - Medical Surgical Unit

## 2023-10-29 NOTE — ANESTHESIA PROCEDURE NOTES
Intubation    Date/Time: 10/29/2023 9:05 AM    Performed by: Irwin Arechiga II, CRNA  Authorized by: Magdi Montoya MD    Intubation:     Induction:  Rapid sequence induction    Intubated:  Postinduction    Mask Ventilation:  Not attempted    Attempts:  1    Attempted By:  CRNA    Method of Intubation:  Direct    Blade:  Brook 4    Laryngeal View Grade: Grade I - full view of cords      Difficult Airway Encountered?: No      Complications:  None    Airway Device:  Oral endotracheal tube    Airway Device Size:  7.5    Style/Cuff Inflation:  Cuffed    Inflation Amount (mL):  8    Tube secured:  23    Secured at:  The lips    Placement Verified By:  Capnometry    Complicating Factors:  None    Findings Post-Intubation:  BS equal bilateral and atraumatic/condition of teeth unchanged

## 2023-10-29 NOTE — H&P (VIEW-ONLY)
[facility]  General Surgery  Consult Note    Consults  Subjective:     Chief Complaint/Reason for Admission:  Abdominal pain nausea    History of Present Illness:  83-year-old male who for the last few days has been at a swing bed with worsening nausea and abdominal pain in the upper/lower chest area.  He had a CT scan last night which showed possible gastric volvulus through a hiatal hernia.  Transferred to [facility] for further evaluation patient has taken his Eliquis yesterday    Current Facility-Administered Medications on File Prior to Encounter   Medication    [COMPLETED] iopamidoL (ISOVUE-370) injection 100 mL    [DISCONTINUED] acetaminophen tablet 650 mg    [DISCONTINUED] apixaban tablet 5 mg    [DISCONTINUED] ARIPiprazole tablet 5 mg    [DISCONTINUED] aspirin EC tablet 81 mg    [DISCONTINUED] calcium carbonate 200 mg calcium (500 mg) chewable tablet 500 mg    [DISCONTINUED] citalopram tablet 40 mg    [DISCONTINUED] cloNIDine tablet 0.1 mg    [DISCONTINUED] cyanocobalamin tablet 1,000 mcg    [DISCONTINUED] flecainide tablet 150 mg    [DISCONTINUED] hydrALAZINE injection 5 mg    [DISCONTINUED] levothyroxine tablet 100 mcg    [DISCONTINUED] LORazepam tablet 0.5 mg    [DISCONTINUED] melatonin tablet 6 mg    [DISCONTINUED] morphine injection 4 mg    [DISCONTINUED] multivitamin tablet    [DISCONTINUED] nicotine 21 mg/24 hr 1 patch    [DISCONTINUED] omega 3-dha-epa-fish oil 1,000 mg (120 mg-180 mg) Cap 1 capsule    [DISCONTINUED] ondansetron disintegrating tablet 4 mg    [DISCONTINUED] oxybutynin 24 hr tablet 15 mg    [DISCONTINUED] pantoprazole EC tablet 40 mg    [DISCONTINUED] polyethylene glycol packet 17 g    [DISCONTINUED] promethazine (PHENERGAN) 6.25 mg in dextrose 5 % (D5W) 50 mL IVPB    [DISCONTINUED] pyridoxine (vitamin B6) tablet 100 mg    [DISCONTINUED] senna-docusate 8.6-50 mg per tablet 1 tablet    [DISCONTINUED] simethicone chewable tablet 80 mg    [DISCONTINUED] sucralfate tablet 1  g    [DISCONTINUED] vitamin D 1000 units tablet 1,000 Units    [DISCONTINUED] vitamin E (dl, acetate) capsule 400 Units     Current Outpatient Medications on File Prior to Encounter   Medication Sig    ARIPiprazole (ABILIFY) 5 MG Tab 1 tablet.    aspirin (ECOTRIN) 81 MG EC tablet TAKE ONE TABLET BY MOUTH DAILY TO PREVENT BLOOD CLOT    citalopram (CELEXA) 40 MG tablet TAKE ONE TABLET BY MOUTH DAILY FOR DEPRESSION    cloNIDine (CATAPRES) 0.1 MG tablet TAKE ONE TABLET BY MOUTH DAILY FOR HIGH BLOOD PRESSURE    cyanocobalamin (VITAMIN B-12) 100 MCG tablet Take 1,000 mcg by mouth.    flecainide (TAMBOCOR) 150 MG Tab Take 1 tablet (150 mg total) by mouth every 12 (twelve) hours.    levothyroxine (SYNTHROID) 100 MCG tablet TAKE ONE TABLET BY MOUTH DAILY IN THE MORNING FOR THYROID REPLACEMENT    melatonin (MELATIN) Take 3 mg by mouth nightly as needed.    mirabegron (MYRBETRIQ) 50 mg Tb24 Take 1 tablet by mouth once daily.    multivit with minerals/lutein (MULTIVITAMIN 50 PLUS ORAL)     omega-3 fatty acids-fish oil 340-1,000 mg Cap Take 1 capsule by mouth.    omeprazole (PRILOSEC) 40 MG capsule 40 mg.    oxybutynin (DITROPAN XL) 15 MG TR24 Take 1 tablet by mouth once daily.    polyethylene glycol (GLYCOLAX) 17 gram/dose powder Take 17 g by mouth.    pyridoxine, vitamin B6, (B-6) 50 MG Tab Take 100 mg by mouth.    vitamin D (VITAMIN D3) 1000 units Tab TAKE ONE TABLET BY MOUTH DAILY (CHOLECALCIFEROL SAME AS VITAMIN D)    vitamin E 400 UNIT capsule 1 capsule.    zinc 50 mg Tab 1 tablet.    zolpidem (AMBIEN) 10 mg Tab Take 10 mg by mouth.       Review of patient's allergies indicates:  No Known Allergies    Past Medical History:   Diagnosis Date    Back injury     CHF (congestive heart failure)     Hypertension     Thyroid disease      Past Surgical History:   Procedure Laterality Date    CHOLECYSTECTOMY      SHOULDER SURGERY      THYROID SURGERY       Family History    None       Tobacco Use    Smoking status: Never     Smokeless tobacco: Never   Substance and Sexual Activity    Alcohol use: Never    Drug use: Never    Sexual activity: Not Currently     Review of Systems   Constitutional:  Negative for activity change, appetite change, fatigue and fever.   HENT:  Negative for trouble swallowing.    Respiratory:  Negative for cough and shortness of breath.    Cardiovascular:  Negative for chest pain and palpitations.   Gastrointestinal:  Positive for abdominal pain and nausea. Negative for abdominal distention, blood in stool, constipation and diarrhea.   Genitourinary:  Negative for flank pain.   Musculoskeletal:  Negative for neck pain and neck stiffness.   Neurological:  Negative for weakness.     Objective:     Vital Signs (Most Recent):  Resp: 18 (10/29/23 0741) Vital Signs (24h Range):  Temp:  [97.9 °F (36.6 °C)-98.3 °F (36.8 °C)] 98 °F (36.7 °C)  Pulse:  [] 100  Resp:  [18-22] 18  SpO2:  [95 %-99 %] 95 %  BP: (150-201)/() 184/79        There is no height or weight on file to calculate BMI.    No intake or output data in the 24 hours ending 10/29/23 0850    Physical Exam  Constitutional:       General: He is not in acute distress.  HENT:      Head: Normocephalic.   Cardiovascular:      Rate and Rhythm: Normal rate and regular rhythm.      Pulses: Normal pulses.   Pulmonary:      Effort: Pulmonary effort is normal. No respiratory distress.      Breath sounds: Normal breath sounds.   Abdominal:      General: Abdomen is flat. There is no distension.      Palpations: Abdomen is soft.      Tenderness: There is abdominal tenderness.   Musculoskeletal:         General: Normal range of motion.   Skin:     General: Skin is warm.   Neurological:      General: No focal deficit present.      Mental Status: He is oriented to person, place, and time.         Significant Labs:  CBC:   Recent Labs   Lab 10/28/23  2347   WBC 5.57   RBC 3.98*   HGB 12.4*   HCT 37.6*      MCV 94.5   MCH 31.2*   MCHC 33.0     CMP:   Recent Labs    Lab 10/28/23  2347   *   CALCIUM 9.3   ALBUMIN 2.9*   PROT 6.8      K 3.2*   CO2 29      BUN 9   CREATININE 1.08   ALKPHOS 145*   ALT 17   AST 23   BILITOT 0.6       Significant Diagnostics:  None    Assessment/Plan:     Active Diagnoses:    Diagnosis Date Noted POA    PRINCIPAL PROBLEM:  Gastric volvulus [K31.89] 10/29/2023 Yes      Problems Resolved During this Admission:     Patient to go to the OR this morning for a robotic hiatal hernia repair possible feeding tube placement for anchoring of the stomach.  Patient did take his Eliquis recently shows increased risk of bleeding which she is aware of.  Risks explained to the patient including risk of bleeding, infection, open operation, recurrence of his hernia, possible gastric necrosis requiring further procedures or resections possibly.  All questions were answered.    Thank you for your consult. I will follow-up with patient. Please contact us if you have any additional questions.    Randy Long MD  General Surgery  [facility]

## 2023-10-29 NOTE — CONSULTS
[facility]  General Surgery  Consult Note    Consults  Subjective:     Chief Complaint/Reason for Admission:  Abdominal pain nausea    History of Present Illness:  83-year-old male who for the last few days has been at a swing bed with worsening nausea and abdominal pain in the upper/lower chest area.  He had a CT scan last night which showed possible gastric volvulus through a hiatal hernia.  Transferred to [facility] for further evaluation patient has taken his Eliquis yesterday    Current Facility-Administered Medications on File Prior to Encounter   Medication    [COMPLETED] iopamidoL (ISOVUE-370) injection 100 mL    [DISCONTINUED] acetaminophen tablet 650 mg    [DISCONTINUED] apixaban tablet 5 mg    [DISCONTINUED] ARIPiprazole tablet 5 mg    [DISCONTINUED] aspirin EC tablet 81 mg    [DISCONTINUED] calcium carbonate 200 mg calcium (500 mg) chewable tablet 500 mg    [DISCONTINUED] citalopram tablet 40 mg    [DISCONTINUED] cloNIDine tablet 0.1 mg    [DISCONTINUED] cyanocobalamin tablet 1,000 mcg    [DISCONTINUED] flecainide tablet 150 mg    [DISCONTINUED] hydrALAZINE injection 5 mg    [DISCONTINUED] levothyroxine tablet 100 mcg    [DISCONTINUED] LORazepam tablet 0.5 mg    [DISCONTINUED] melatonin tablet 6 mg    [DISCONTINUED] morphine injection 4 mg    [DISCONTINUED] multivitamin tablet    [DISCONTINUED] nicotine 21 mg/24 hr 1 patch    [DISCONTINUED] omega 3-dha-epa-fish oil 1,000 mg (120 mg-180 mg) Cap 1 capsule    [DISCONTINUED] ondansetron disintegrating tablet 4 mg    [DISCONTINUED] oxybutynin 24 hr tablet 15 mg    [DISCONTINUED] pantoprazole EC tablet 40 mg    [DISCONTINUED] polyethylene glycol packet 17 g    [DISCONTINUED] promethazine (PHENERGAN) 6.25 mg in dextrose 5 % (D5W) 50 mL IVPB    [DISCONTINUED] pyridoxine (vitamin B6) tablet 100 mg    [DISCONTINUED] senna-docusate 8.6-50 mg per tablet 1 tablet    [DISCONTINUED] simethicone chewable tablet 80 mg    [DISCONTINUED] sucralfate tablet 1  g    [DISCONTINUED] vitamin D 1000 units tablet 1,000 Units    [DISCONTINUED] vitamin E (dl, acetate) capsule 400 Units     Current Outpatient Medications on File Prior to Encounter   Medication Sig    ARIPiprazole (ABILIFY) 5 MG Tab 1 tablet.    aspirin (ECOTRIN) 81 MG EC tablet TAKE ONE TABLET BY MOUTH DAILY TO PREVENT BLOOD CLOT    citalopram (CELEXA) 40 MG tablet TAKE ONE TABLET BY MOUTH DAILY FOR DEPRESSION    cloNIDine (CATAPRES) 0.1 MG tablet TAKE ONE TABLET BY MOUTH DAILY FOR HIGH BLOOD PRESSURE    cyanocobalamin (VITAMIN B-12) 100 MCG tablet Take 1,000 mcg by mouth.    flecainide (TAMBOCOR) 150 MG Tab Take 1 tablet (150 mg total) by mouth every 12 (twelve) hours.    levothyroxine (SYNTHROID) 100 MCG tablet TAKE ONE TABLET BY MOUTH DAILY IN THE MORNING FOR THYROID REPLACEMENT    melatonin (MELATIN) Take 3 mg by mouth nightly as needed.    mirabegron (MYRBETRIQ) 50 mg Tb24 Take 1 tablet by mouth once daily.    multivit with minerals/lutein (MULTIVITAMIN 50 PLUS ORAL)     omega-3 fatty acids-fish oil 340-1,000 mg Cap Take 1 capsule by mouth.    omeprazole (PRILOSEC) 40 MG capsule 40 mg.    oxybutynin (DITROPAN XL) 15 MG TR24 Take 1 tablet by mouth once daily.    polyethylene glycol (GLYCOLAX) 17 gram/dose powder Take 17 g by mouth.    pyridoxine, vitamin B6, (B-6) 50 MG Tab Take 100 mg by mouth.    vitamin D (VITAMIN D3) 1000 units Tab TAKE ONE TABLET BY MOUTH DAILY (CHOLECALCIFEROL SAME AS VITAMIN D)    vitamin E 400 UNIT capsule 1 capsule.    zinc 50 mg Tab 1 tablet.    zolpidem (AMBIEN) 10 mg Tab Take 10 mg by mouth.       Review of patient's allergies indicates:  No Known Allergies    Past Medical History:   Diagnosis Date    Back injury     CHF (congestive heart failure)     Hypertension     Thyroid disease      Past Surgical History:   Procedure Laterality Date    CHOLECYSTECTOMY      SHOULDER SURGERY      THYROID SURGERY       Family History    None       Tobacco Use    Smoking status: Never     Smokeless tobacco: Never   Substance and Sexual Activity    Alcohol use: Never    Drug use: Never    Sexual activity: Not Currently     Review of Systems   Constitutional:  Negative for activity change, appetite change, fatigue and fever.   HENT:  Negative for trouble swallowing.    Respiratory:  Negative for cough and shortness of breath.    Cardiovascular:  Negative for chest pain and palpitations.   Gastrointestinal:  Positive for abdominal pain and nausea. Negative for abdominal distention, blood in stool, constipation and diarrhea.   Genitourinary:  Negative for flank pain.   Musculoskeletal:  Negative for neck pain and neck stiffness.   Neurological:  Negative for weakness.     Objective:     Vital Signs (Most Recent):  Resp: 18 (10/29/23 0741) Vital Signs (24h Range):  Temp:  [97.9 °F (36.6 °C)-98.3 °F (36.8 °C)] 98 °F (36.7 °C)  Pulse:  [] 100  Resp:  [18-22] 18  SpO2:  [95 %-99 %] 95 %  BP: (150-201)/() 184/79        There is no height or weight on file to calculate BMI.    No intake or output data in the 24 hours ending 10/29/23 0850    Physical Exam  Constitutional:       General: He is not in acute distress.  HENT:      Head: Normocephalic.   Cardiovascular:      Rate and Rhythm: Normal rate and regular rhythm.      Pulses: Normal pulses.   Pulmonary:      Effort: Pulmonary effort is normal. No respiratory distress.      Breath sounds: Normal breath sounds.   Abdominal:      General: Abdomen is flat. There is no distension.      Palpations: Abdomen is soft.      Tenderness: There is abdominal tenderness.   Musculoskeletal:         General: Normal range of motion.   Skin:     General: Skin is warm.   Neurological:      General: No focal deficit present.      Mental Status: He is oriented to person, place, and time.         Significant Labs:  CBC:   Recent Labs   Lab 10/28/23  2347   WBC 5.57   RBC 3.98*   HGB 12.4*   HCT 37.6*      MCV 94.5   MCH 31.2*   MCHC 33.0     CMP:   Recent Labs    Lab 10/28/23  2347   *   CALCIUM 9.3   ALBUMIN 2.9*   PROT 6.8      K 3.2*   CO2 29      BUN 9   CREATININE 1.08   ALKPHOS 145*   ALT 17   AST 23   BILITOT 0.6       Significant Diagnostics:  None    Assessment/Plan:     Active Diagnoses:    Diagnosis Date Noted POA    PRINCIPAL PROBLEM:  Gastric volvulus [K31.89] 10/29/2023 Yes      Problems Resolved During this Admission:     Patient to go to the OR this morning for a robotic hiatal hernia repair possible feeding tube placement for anchoring of the stomach.  Patient did take his Eliquis recently shows increased risk of bleeding which she is aware of.  Risks explained to the patient including risk of bleeding, infection, open operation, recurrence of his hernia, possible gastric necrosis requiring further procedures or resections possibly.  All questions were answered.    Thank you for your consult. I will follow-up with patient. Please contact us if you have any additional questions.    Randy Long MD  General Surgery  [facility]

## 2023-10-29 NOTE — PLAN OF CARE
Problem: Adult Inpatient Plan of Care  Goal: Patient-Specific Goal (Individualized)  Outcome: Ongoing, Progressing     Problem: Fall Injury Risk  Goal: Absence of Fall and Fall-Related Injury  Outcome: Ongoing, Progressing  Intervention: Identify and Manage Contributors  Flowsheets (Taken 10/29/2023 0511)  Self-Care Promotion: independence encouraged  Medication Review/Management: medications reviewed  Intervention: Promote Injury-Free Environment  Flowsheets (Taken 10/29/2023 0511)  Safety Promotion/Fall Prevention:   assistive device/personal item within reach   instructed to call staff for mobility   side rails raised x 2

## 2023-10-29 NOTE — INTERVAL H&P NOTE
The patient has been examined and the H&P has been reviewed:    I concur with the findings and no changes have occurred since H&P was written.    Surgery risks, benefits and alternative options discussed and understood by patient/family.          Active Hospital Problems    Diagnosis  POA    *Gastric volvulus [K31.89]  Yes      Resolved Hospital Problems   No resolved problems to display.

## 2023-10-29 NOTE — PT/OT/SLP PROGRESS
Occupational Therapy      Patient Name:  Joey King   MRN:  [id number]    Patient not seen today secondary to Off the floor for procedure/surgery. Pt having sx for Hiatal Hernia Repair.Will follow-up 10/30/23.    10/29/2023

## 2023-10-29 NOTE — ANESTHESIA PREPROCEDURE EVALUATION
10/29/2023  Joey King is a 83 y.o., male.      Pre-op Assessment    I have reviewed the Patient Summary Reports.     I have reviewed the Nursing Notes. I have reviewed the NPO Status.   I have reviewed the Medications.     Review of Systems  Anesthesia Hx:  Denies Family Hx of Anesthesia complications.   Denies Personal Hx of Anesthesia complications.   Social:  Non-Smoker, No Alcohol Use    Hematology/Oncology:  Hematology Normal   Oncology Normal     EENT/Dental:EENT/Dental Normal   Cardiovascular:   Hypertension Dysrhythmias (Eliquis) atrial fibrillation CHF hyperlipidemia ECG has been reviewed.    Pulmonary:   Sleep Apnea    Renal/:  Renal/ Normal     Hepatic/GI:   GERD    Musculoskeletal:  Musculoskeletal Normal    Neurological:  Neurology Normal    Endocrine:   Hypothyroidism    Dermatological:  Skin Normal    Psych:   anxiety depression          Physical Exam  General: Well nourished, Cooperative, Alert and Oriented    Airway:  Mallampati: II / II  Mouth Opening: Normal  TM Distance: Normal  Neck ROM: Normal ROM    Dental:  Intact    Chest/Lungs:  Clear to auscultation    Heart:  Rate: Tachycardia  Rhythm: Regular Rhythm  Sounds: Normal        Chemistry        Component Value Date/Time     10/28/2023 2347    K 3.2 (L) 10/28/2023 2347     10/28/2023 2347    CO2 29 10/28/2023 2347    BUN 9 10/28/2023 2347    CREATININE 1.08 10/28/2023 2347     (H) 10/28/2023 2347        Component Value Date/Time    CALCIUM 9.3 10/28/2023 2347    ALKPHOS 145 (H) 10/28/2023 2347    AST 23 10/28/2023 2347    ALT 17 10/28/2023 2347    BILITOT 0.6 10/28/2023 2347    EGFRNONAA 59 (L) 05/24/2021 1635        Lab Results   Component Value Date    WBC 5.57 10/28/2023    HGB 12.4 (L) 10/28/2023    HCT 37.6 (L) 10/28/2023     10/28/2023     Results for orders placed or performed during the  hospital encounter of 10/13/23   EKG 12-lead    Collection Time: 10/18/23 10:05 AM    Narrative    Test Reason : R07.9,    Vent. Rate : 105 BPM     Atrial Rate : 105 BPM     P-R Int : 206 ms          QRS Dur : 096 ms      QT Int : 496 ms       P-R-T Axes : 113 -68 046 degrees     QTc Int : 655 ms    Sinus tachycardia  Pulmonary disease pattern  Left anterior fascicular block  Nonspecific ST and T wave abnormality  When compared with ECG of 17-OCT-2023 21:59,  Non-specific change in ST segment in Lateral leads  QT has lengthened  Confirmed by Pamela Guevara MD ([id number]) on 10/18/2023 6:12:01 PM    Referred By: BRANDON FERNANDEZ           Confirmed By:Pamela Guevara MD         Anesthesia Plan  Type of Anesthesia, risks & benefits discussed:    Anesthesia Type: Gen ETT  Intra-op Monitoring Plan: Standard ASA Monitors  Post Op Pain Control Plan: multimodal analgesia  Induction:  IV  Airway Plan: Direct  Informed Consent: Informed consent signed with the Patient and all parties understand the risks and agree with anesthesia plan.  All questions answered.   ASA Score: 4  Day of Surgery Review of History & Physical: H&P Update referred to the surgeon/provider.I have interviewed and examined the patient. I have reviewed the patient's H&P dated: There are no significant changes.     Ready For Surgery From Anesthesia Perspective.     .

## 2023-10-29 NOTE — ANESTHESIA POSTPROCEDURE EVALUATION
Anesthesia Post Evaluation    Patient: Joey King    Procedure(s) Performed: Procedure(s) (LRB):  XI ROBOTIC REPAIR, HERNIA, HIATAL (N/A)    Final Anesthesia Type: general      Patient location during evaluation: ICU  Patient participation: Yes- Able to Participate  Level of consciousness: awake and alert  Post-procedure vital signs: reviewed and stable  Pain management: adequate  Airway patency: patent  MARCELO mitigation strategies: Multimodal analgesia  PONV status at discharge: No PONV  Anesthetic complications: no      Cardiovascular status: blood pressure returned to baseline  Respiratory status: unassisted  Hydration status: euvolemic  Follow-up not needed.          Vitals Value Taken Time   /72 10/29/23 1216   Temp 36.8 °C (98.2 °F) 10/29/23 1100   Pulse 100 10/29/23 1218   Resp 14 10/29/23 1218   SpO2 91 % 10/29/23 1218   Vitals shown include unvalidated device data.      No case tracking events are documented in the log.      Pain/Dayana Score: Pain Rating Prior to Med Admin: 6 (10/29/2023  7:41 AM)  Pain Rating Post Med Admin: 0 (10/29/2023  1:34 AM)

## 2023-10-29 NOTE — PLAN OF CARE
[facility] ICU  Initial Discharge Assessment       Primary Care Provider: Prashanth Ryan DO    Admission Diagnosis: Gastric volvulus [K31.89]    Admission Date: 10/29/2023  Expected Discharge Date:     Transition of Care Barriers: (P) None    Payor: MEDICARE / Plan: MEDICARE PART A & B / Product Type: Government /     Extended Emergency Contact Information  Primary Emergency Contact: Ashley King  Mobile Phone: [phone]  Relation: Daughter  Preferred language: English   needed? No    Discharge Plan A: (P) Rehab (Swingbed)  Discharge Plan B: (P) Home, Home Health      [facility] [address] [place] [address]  [address] [address]  Phone: [phone] Fax: [phone]    [address]  [address] [address]  Phone: [phone] Fax: [phone]      Initial Assessment (most recent)       Adult Discharge Assessment - 10/29/23 1419          Discharge Assessment    Assessment Type Discharge Planning Assessment (P)      Confirmed/corrected address, phone number and insurance Yes (P)      Confirmed Demographics Correct on Facesheet (P)      Source of Information family (P)      Communicated DAVID with patient/caregiver Date not available/Unable to determine (P)      People in Home alone (P)      Do you expect to return to your current living situation? Yes (P)      Do you have help at home or someone to help you manage your care at home? Yes (P)      Who are your caregiver(s) and their phone number(s)? Ashley King (Daughter) [phone] (P)      Prior to hospitilization cognitive status: Alert/Oriented (P)      Current cognitive status: Alert/Oriented (P)      Walking or Climbing Stairs ambulation difficulty, assistance 1 person (P)    Per daughter, pt requires some assistance    Dressing/Bathing bathing difficulty, assistance 1 person (P)    Assistance required per daughter  in the last 6 weeks.    Equipment Currently Used at Home none (P)      Readmission within 30 days? No (P)      Patient currently being followed by outpatient case management? No (P)      Do you currently have service(s) that help you manage your care at home? Yes (P)      Name and Contact number of agency [facility] [phone] (P)      Is the pt/caregiver preference to resume services with current agency Yes (P)      Do you take prescription medications? Yes (P)      Do you have prescription coverage? Yes (P)      Coverage Medicare (P)      Do you have any problems affording any of your prescribed medications? No (P)      Who is going to help you get home at discharge? Daughter (P)      How do you get to doctors appointments? car, drives self;family or friend will provide (P)      Are you on dialysis? No (P)      Do you take coumadin? No (P)      DME Needed Upon Discharge  none (P)      Discharge Plan discussed with: Adult children (P)      Transition of Care Barriers None (P)      Discharge Plan A Rehab (P)    Swingbed    Discharge Plan B Home;Home Health (P)         Physical Activity    On average, how many days per week do you engage in moderate to strenuous exercise (like a brisk walk)? 0 days (P)      On average, how many minutes do you engage in exercise at this level? 0 min (P)         Financial Resource Strain    How hard is it for you to pay for the very basics like food, housing, medical care, and heating? Not hard at all (P)         Housing Stability    In the last 12 months, was there a time when you were not able to pay the mortgage or rent on time? No (P)      In the last 12 months, how many places have you lived? 1 (P)      In the last 12 months, was there a time when you did not have a steady place to sleep or slept in a shelter (including now)? No (P)         Transportation Needs    In the past 12 months, has lack of transportation kept you from medical appointments or from getting medications?  "No (P)      In the past 12 months, has lack of transportation kept you from meetings, work, or from getting things needed for daily living? No (P)         Food Insecurity    Within the past 12 months, you worried that your food would run out before you got the money to buy more. Never true (P)      Within the past 12 months, the food you bought just didn't last and you didn't have money to get more. Never true (P)         Stress    Do you feel stress - tense, restless, nervous, or anxious, or unable to sleep at night because your mind is troubled all the time - these days? To some extent (P)    Per daughter, pt's stress is related to "sickness."       Social Connections    In a typical week, how many times do you talk on the phone with family, friends, or neighbors? More than three times a week (P)      How often do you get together with friends or relatives? More than three times a week (P)      How often do you attend Islam or Restorationist services? Never (P)      Do you belong to any clubs or organizations such as Islam groups, unions, fraternal or athletic groups, or school groups? No (P)      How often do you attend meetings of the clubs or organizations you belong to? Never (P)      Are you , , , , never , or living with a partner?  (P)         Alcohol Use    Q1: How often do you have a drink containing alcohol? Never (P)      Q2: How many drinks containing alcohol do you have on a typical day when you are drinking? Patient does not drink (P)      Q3: How often do you have six or more drinks on one occasion? Never (P)                    SW received consult for discharge planning.  Spoke with pt's daughter, Ashley, at pt's bedside.  Pt lives alone, is current with [facility], and uses no DME.  Prior to admission, Ashley states pt was at [facility] and she would like him to return to [facility] at discharge.  Thereafter, plans are for pt to return home and resume HH with " [facility].  Choice obtained for [facility] and [facility]. SS will make referral when therapy evaluations are completed.  IM obtained.  SS following.

## 2023-10-29 NOTE — HPI
83-year-old male with multiple medical problems including hypertension atrial fibrillation CHF anxiety depression pulmonary embolus in February 2023 hypothyroidism cholecystectomy was recently in [facility] a Taty-Colon tear had 2 hemostatic clips placed and was at [facility] for rehab.  On 1029 patient had acute onset of vomiting and chest pain he reports he begin with nausea vomiting and then developed chest pain he reports pain is lower anterior chest nonradiating denies shortness of breath palpitations lightheadedness and CT showed concern for developing gastric volvulus will sent down here for further evaluation by Dr. Ambrosio patient was also noted to have a mildly elevated troponin 2nd check EKGs pending for me at this time patient denies any shortness of breath and no chest pain at this time

## 2023-10-29 NOTE — PT/OT/SLP PROGRESS
Physical Therapy      Patient Name:  Joey King   MRN:  [id number]    Patient not seen today secondary to Off the floor for procedure/surgery (pt in surgery for hernia repair). Will follow-up 10/30/23.

## 2023-10-29 NOTE — H&P
[facility] ICU  Pulmonology  H&P    Patient Name: Joey King  MRN: [id number]  Admission Date: 10/29/2023  Code Status: Full Code  Primary Care Provider: Prashanth Ryan DO   Principal Problem: Gastric volvulus    Subjective:     HPI:  83-year-old male with multiple medical problems including hypertension atrial fibrillation CHF anxiety depression pulmonary embolus in February 2023 hypothyroidism cholecystectomy was recently in [facility] a Taty-Colon tear had 2 hemostatic clips placed and was at [facility] for rehab.  On 1029 patient had acute onset of vomiting and chest pain he reports he begin with nausea vomiting and then developed chest pain he reports pain is lower anterior chest nonradiating denies shortness of breath palpitations lightheadedness and CT showed concern for developing gastric volvulus will sent down here for further evaluation by Dr. Ambrosio patient was also noted to have a mildly elevated troponin 2nd check EKGs pending for me at this time patient denies any shortness of breath and no chest pain at this time      Past Medical History:   Diagnosis Date    Back injury     CHF (congestive heart failure)     Hypertension     Thyroid disease        Past Surgical History:   Procedure Laterality Date    CHOLECYSTECTOMY      SHOULDER SURGERY      THYROID SURGERY         Review of patient's allergies indicates:  No Known Allergies    Family History    None       Tobacco Use    Smoking status: Never    Smokeless tobacco: Never   Substance and Sexual Activity    Alcohol use: Never    Drug use: Never    Sexual activity: Not Currently         Review of Systems   Constitutional:  Negative for activity change and appetite change.   HENT:  Negative for congestion and dental problem.    Eyes:  Negative for discharge and itching.   Respiratory:  Negative for apnea and chest tightness.    Cardiovascular:  Negative for chest pain and leg swelling.   Gastrointestinal:   Negative for abdominal distention.   Endocrine: Negative for cold intolerance and heat intolerance.   Genitourinary:  Negative for difficulty urinating and dysuria.   Musculoskeletal:  Negative for arthralgias and back pain.   Skin:  Negative for color change.   Allergic/Immunologic: Negative for environmental allergies.   Neurological:  Negative for dizziness and facial asymmetry.   Hematological:  Negative for adenopathy. Does not bruise/bleed easily.   Psychiatric/Behavioral:  Negative for agitation and behavioral problems.      Objective:     Vital Signs (Most Recent):  Resp: 18 (10/29/23 0741) Vital Signs (24h Range):  Temp:  [97.9 °F (36.6 °C)-98.3 °F (36.8 °C)] 98 °F (36.7 °C)  Pulse:  [] 100  Resp:  [18-22] 18  SpO2:  [95 %-99 %] 95 %  BP: (150-201)/() 184/79        There is no height or weight on file to calculate BMI.    No intake or output data in the 24 hours ending 10/29/23 0900     Physical Exam  Vitals reviewed.   Constitutional:       Appearance: Normal appearance.      Interventions: He is not intubated.  HENT:      Head: Normocephalic and atraumatic.      Nose: Nose normal.      Mouth/Throat:      Mouth: Mucous membranes are dry.      Pharynx: Oropharynx is clear.   Eyes:      Extraocular Movements: Extraocular movements intact.      Conjunctiva/sclera: Conjunctivae normal.      Pupils: Pupils are equal, round, and reactive to light.   Cardiovascular:      Rate and Rhythm: Normal rate.      Heart sounds: Normal heart sounds. No murmur heard.  Pulmonary:      Effort: Pulmonary effort is normal. He is not intubated.      Breath sounds: Normal breath sounds.   Abdominal:      General: Abdomen is flat. Bowel sounds are normal.      Palpations: Abdomen is soft.      Tenderness: There is abdominal tenderness.   Musculoskeletal:         General: Normal range of motion.      Cervical back: Normal range of motion and neck supple.      Right lower leg: No edema.      Left lower leg: No edema.    Skin:     General: Skin is warm and dry.      Capillary Refill: Capillary refill takes less than 2 seconds.   Neurological:      General: No focal deficit present.      Mental Status: He is alert and oriented to person, place, and time.   Psychiatric:         Mood and Affect: Mood normal.         Behavior: Behavior normal.          Vents:       Lines/Drains/Airways       Peripheral Intravenous Line  Duration                  Peripheral IV - Single Lumen 10/18/23 2141 20 G Anterior;Right Forearm 10 days         Peripheral IV - Single Lumen 10/29/23 0126 20 G Left;Posterior Hand <1 day                    Significant Labs:    CBC/Anemia Profile:  Recent Labs   Lab 10/28/23  2347   WBC 5.57   HGB 12.4*   HCT 37.6*      MCV 94.5   RDW 14.1        Chemistries:  Recent Labs   Lab 10/28/23  2347      K 3.2*      CO2 29   BUN 9   CREATININE 1.08   CALCIUM 9.3   ALBUMIN 2.9*   PROT 6.8   BILITOT 0.6   ALKPHOS 145*   ALT 17   AST 23       Recent Lab Results         10/29/23  0608   10/29/23  0258   10/28/23  2347        Albumin/Globulin Ratio     0.7       Albumin     2.9       ALP     145       ALT     17       Anion Gap     10       AST     23       Baso #     0.03       Basophil %     0.5       BILIRUBIN TOTAL     0.6       BUN     9       BUN/CREAT RATIO     8       Calcium     9.3       Chloride     100       CO2     29       Creatinine     1.08       Differential Method     Auto       eGFR     68       Eos #     0.02       Eosinophil %     0.4       Globulin, Total     3.9       Glucose     119       Hematocrit     37.6       Hemoglobin     12.4       Lymph #     0.94       Lymph %     16.9       MCH     31.2       MCHC     33.0       MCV     94.5       Mono #     0.70       Mono %     12.6       MPV     8.6       Neutrophils, Abs     3.88       Neutrophils Relative     69.6       Platelet Count     267       Potassium     3.2       PROTEIN TOTAL     6.8       RBC     3.98       RDW     14.1        Sodium     136       Troponin I High Sensitivity 99.0   31.3   11.7       WBC     5.57               Significant Imaging:   I have reviewed all pertinent imaging results/findings within the past 24 hours.    Assessment/Plan:     Psychiatric  Generalized anxiety disorder  Noted    Cardiac/Vascular  Essential hypertension  Blood pressure is 150/72 partially related to pain will watch right now nothing by mouth    Elevated troponin  Patient has elevated troponin repeat EKGs mildly elevated but he has need for emergent surgery echocardiogram has been done we consulted Cardiology    Renal/  Hypokalemia  Will supplement with IV medicines    GI  * Gastric volvulus  Patient currently being evaluated for emergent surgery    Gastroesophageal reflux disease without esophagitis  On Protonix    Taty-Colon tear  Recently admitted with his no indication that is an issue right now          COMPLETED  Family history is reviewed and has not changed      Napoleon No MD  Pulmonology  [facility] ICU

## 2023-10-29 NOTE — PROGRESS NOTES
[facility]  Hospital Medicine  Progress Note    Patient Name: Joey King  MRN: [id number]  Patient Class: IP- Swing   Admission Date: 10/13/2023  Length of Stay: 16 days  Attending Physician: Len Palacios IV, DO  Primary Care Provider: Prashanth Ryan DO        Subjective:     Principal Problem:Weakness        HPI:  Mr. Joye King is an 83 year old male with pmh of htn, afib, CHF,anxiety,depression,PE in Feb.2023, EGRD, hypothyroidism,cholecystectomy 09/23/23.He is s/p an observation stay at [facility]. EGD shows Taty Colon Tear . Had 2 hemostatic clips applied. He had no further bleeding. Records show H&H is 9/27. He has been accepted back to swing bed at [facility].     Mr. King was admitted to [facility] on 10/13/2023 for continued therapy.           Overview/Hospital Course:  Mr. King is 83 year old white male who was admitted [facility] for swing bed services for therapy.  He was received from [facility] in [place], following an admission for UGIB secondary to Taty-Colon tear.  He was seen by GI at [place] who performed EGD to confirm this diagnosis.  GI placed hemostatic clips for the bleed.  Pt is on long term use of Eliquis due to history of Afib and PE.  GI recommended holding his Eliquis for an additional week.  At that time, if there are no signs of GIB, it can be restarted.  He has no complaints voiced tonight.     0620: I was called by nursing staff after the patient had fallen.  He reports he was going to the bathroom, but was too weak.  He denies hitting his head and denies LOC.  Nursing staff reports they found him on the floor at the foot of the bed.  Pt is alert and oriented x3.  He denies pain.  FROM without pain to all 4 extremities.  Denies neck/back pain.  No obvious signs of soft tissue injury.      10/17/23 Awake and resting in bed without complaints. Daughter at bedside. Denies any bleeding. Continue with PT  and OT .     10/18/23 (00:20)  Nursing staff report one episode of vomiting--patient c/o nausea earlier tonight and was given Zofran ODT. One episode of bilious emesis reported. Will start saline lock and administer protonix and promethazine.     10/18/23 (0450) Elevated BP Reported. Continues to feel nauseated at times. Denies headache, chest pain, diaphoresis, or dizziness. Advised to administer Hydralazine 5mg IVP and monitor BP--will titrate med as needed.     10/18/23 Awake and resting in bed. Reports he started vomiting around midnight. Denies having any dark coffee ground emesis or any signs of blood. Denies abdominal pain. Denies chest pain. Does have some belching. Complains of continued nausea. Will give zofran IV  and monitor. States he did not sleep any last night.    0854 no further nausea. States he is going to try and take a nap.    0932 complains of abd and chest pain at level 8. No further nausea. Stat troponin and EKG ordered. He has had some belching this morning. Will also check KUB and give some simethicone.  Troponin is 12.9. EKG ST at 105, nonspecific ST and T wave abn.    1200 Had norco @ 1119. States pain is about a 4-5. Reports passing some gas. Repeat troponin pending.   Vomitus sent for occult blood.    1253 troponin 14- continues to have nausea. Will make npo, give fluids and treat nausea.    10/19/23 no further nausea or vomiting. Deneis chest pain. Has tolerated CL diet this morning. Will slowly advance diet as tolerated.    10/20/23 tolerating full liquids. Increased to dysphagia diet. No further reports of nausea or vomiting . No signs of bleeding . Will resume eliquis BID.    10/24/2023-patient seen this morning lying in bed with his eyes closed.  Patient is easily arousable and states that he is feeling much better.  Patient's nausea has passed at this time.  He is still on a dysphagia diet and I do not intend to change that day.  Review of most recent laboratory evaluation shows  grossly within lab reference ranges or at baseline for patient.  We will continue to monitor and treat as appropriate.  We will continue to evaluate for discharge planning.    10/29/23 RN called to report the patient was having vomiting and chest pain.  Pt reports he began with the n/v, then developed the chest pain.  He reports pain is lower anterior chest and nonradiating. Denies SOB, palpitations, lightheadedness.  Pt recently has UGIB with Taty-Colon tear.  The tear require hemostatic clipping by GIMD.  Spoke to Dr Bonner regarding the patient.  Given his symptoms and hx, will trend his troponins, get EKG and CT chest.      No new subjective & objective note has been filed under this hospital service since the last note was generated.      Assessment/Plan:      * Weakness  10/13/23 PT and OT to evaluate and treat      10/16/23 PT and OT to evaluate and treat    10/17/23 continue PT and OT    H/O: GI bleed    10/13/23 records from [facility] show recent GI bleed - had EGD that [id number] mm bleeding  Taty Colon tear- had 2 hemastatic clips applied. No further bleeding   H&H stable 9/27   GI recommends holding eliquis for another week- resume then if no further bleeding     10/16/23 h&h 10.2/31.3    10/17/23 denies any bleeding - resume eliquis 10/20 if no further bleeding     10/19/23 stable , H&H is 11.1/34.5    Chronic diastolic CHF (congestive heart failure)  10/13/23   Daily weights  Intake and Output      10/16/23 stable    10/20/23 stable    Depression  10/13/23 continue celexa 40 mg po daily  Continue abilify 5 mg po daily        Paroxysmal atrial fibrillation  10/13/23   continue flecanide  monitor on telemetry   eliquis on hold for recent gi bleed- can resume elquis in one week if no further bleeding    10/17/23     resume eliquis 5 mg po BID on 10/20 if no bleeding     10/20/23 eliquis 5 mg po BID resumed     Hypothyroidism    10/13/23 continue levothyroxine   100 mcg po daily    Gastroesophageal  reflux disease without esophagitis  10/13/23 continue protonix 40 mg podaily      10/20/23 dysphagia diet     Essential hypertension  10/13/23 continue clonidone 0.1 mg po daily      10/16/23 stable 140/70    10/20 stable    VTE Risk Mitigation (From admission, onward)         Ordered     apixaban tablet 5 mg  2 times daily         10/20/23 0853     IP VTE LOW RISK PATIENT  Once         10/13/23 2008     Place ULISES hose  Until discontinued         10/13/23 2008                Discharge Planning   DAVID: 11/6/2023     Code Status: Full Code   Is the patient medically ready for discharge?:     Reason for patient still in hospital (select all that apply): Patient new problem, Patient trending condition, Laboratory test, Treatment and PT / OT recommendations  Discharge Plan A: Home, Home Health                  JERO Mejia  Department of Hospital Medicine   [facility] - [facility] Surgical Unit

## 2023-10-29 NOTE — PROGRESS NOTES
[facility] - [facility] Surgical Unit  Hospital Medicine  Progress Note    Patient Name: Joey King  MRN: [id number]  Patient Class: IP- Swing   Admission Date: 10/13/2023  Length of Stay: 16 days  Attending Physician: Len Palacios IV, DO  Primary Care Provider: Prashanth Ryan DO        Subjective:     Principal Problem:Weakness        HPI:  Mr. Joey King is an 83 year old male with pmh of htn, afib, CHF,anxiety,depression,PE in Feb.2023, EGRD, hypothyroidism,cholecystectomy 09/23/23.He is s/p an observation stay at [facility]. EGD shows Taty Colon Tear . Had 2 hemostatic clips applied. He had no further bleeding. Records show H&H is 9/27. He has been accepted back to swing bed at [facility].     Mr. King was admitted to [facility] on 10/13/2023 for continued therapy.           Overview/Hospital Course:  Mr. King is 83 year old white male who was admitted [facility] for swing bed services for therapy.  He was received from [facility] in [place], following an admission for UGIB secondary to Taty-Colon tear.  He was seen by GI at [place] who performed EGD to confirm this diagnosis.  GI placed hemostatic clips for the bleed.  Pt is on long term use of Eliquis due to history of Afib and PE.  GI recommended holding his Eliquis for an additional week.  At that time, if there are no signs of GIB, it can be restarted.  He has no complaints voiced tonight.     0620: I was called by nursing staff after the patient had fallen.  He reports he was going to the bathroom, but was too weak.  He denies hitting his head and denies LOC.  Nursing staff reports they found him on the floor at the foot of the bed.  Pt is alert and oriented x3.  He denies pain.  FROM without pain to all 4 extremities.  Denies neck/back pain.  No obvious signs of soft tissue injury.      10/17/23 Awake and resting in bed without complaints. Daughter at bedside. Denies any bleeding. Continue with PT  and OT .     10/18/23 (00:20)  Nursing staff report one episode of vomiting--patient c/o nausea earlier tonight and was given Zofran ODT. One episode of bilious emesis reported. Will start saline lock and administer protonix and promethazine.     10/18/23 (0450) Elevated BP Reported. Continues to feel nauseated at times. Denies headache, chest pain, diaphoresis, or dizziness. Advised to administer Hydralazine 5mg IVP and monitor BP--will titrate med as needed.     10/18/23 Awake and resting in bed. Reports he started vomiting around midnight. Denies having any dark coffee ground emesis or any signs of blood. Denies abdominal pain. Denies chest pain. Does have some belching. Complains of continued nausea. Will give zofran IV  and monitor. States he did not sleep any last night.    0854 no further nausea. States he is going to try and take a nap.    0932 complains of abd and chest pain at level 8. No further nausea. Stat troponin and EKG ordered. He has had some belching this morning. Will also check KUB and give some simethicone.  Troponin is 12.9. EKG ST at 105, nonspecific ST and T wave abn.    1200 Had norco @ 1119. States pain is about a 4-5. Reports passing some gas. Repeat troponin pending.   Vomitus sent for occult blood.    1253 troponin 14- continues to have nausea. Will make npo, give fluids and treat nausea.    10/19/23 no further nausea or vomiting. Deneis chest pain. Has tolerated CL diet this morning. Will slowly advance diet as tolerated.    10/20/23 tolerating full liquids. Increased to dysphagia diet. No further reports of nausea or vomiting . No signs of bleeding . Will resume eliquis BID.    10/24/2023-patient seen this morning lying in bed with his eyes closed.  Patient is easily arousable and states that he is feeling much better.  Patient's nausea has passed at this time.  He is still on a dysphagia diet and I do not intend to change that day.  Review of most recent laboratory evaluation shows  grossly within lab reference ranges or at baseline for patient.  We will continue to monitor and treat as appropriate.  We will continue to evaluate for discharge planning.    10/29/23 RN called to report the patient was having vomiting and chest pain.  Pt reports he began with the n/v, then developed the chest pain.  He reports pain is lower anterior chest and nonradiating. Denies SOB, palpitations, lightheadedness.  Pt recently has UGIB with Taty-Colon tear.  The tear require hemostatic clipping by GIMD.  Spoke to Dr Bonner regarding the patient.  Given his symptoms and hx, will trend his troponins, get EKG and CT chest.    10/29/23 CT shows concern for developing gastric volvulus.  Pt reports his n/v has resolved, but he is still in pain.  I spoke with Dr Bonner who has accepted the patient as transfer.  I also spoke with Dr Long, who will be a consulting provider in this case.      No new subjective & objective note has been filed under this hospital service since the last note was generated.      Assessment/Plan:      * Weakness  10/13/23 PT and OT to evaluate and treat      10/16/23 PT and OT to evaluate and treat    10/17/23 continue PT and OT    H/O: GI bleed    10/13/23 records from [facility] show recent GI bleed - had EGD that [id number] mm bleeding  Atty Colon tear- had 2 hemastatic clips applied. No further bleeding   H&H stable 9/27   GI recommends holding eliquis for another week- resume then if no further bleeding     10/16/23 h&h 10.2/31.3    10/17/23 denies any bleeding - resume eliquis 10/20 if no further bleeding     10/19/23 stable , H&H is 11.1/34.5    Chronic diastolic CHF (congestive heart failure)  10/13/23   Daily weights  Intake and Output      10/16/23 stable    10/20/23 stable    Depression  10/13/23 continue celexa 40 mg po daily  Continue abilify 5 mg po daily        Paroxysmal atrial fibrillation  10/13/23   continue flecanide  monitor on telemetry   eliquis on hold for recent gi bleed-  can resume elquis in one week if no further bleeding    10/17/23     resume eliquis 5 mg po BID on 10/20 if no bleeding     10/20/23 eliquis 5 mg po BID resumed     Hypothyroidism    10/13/23 continue levothyroxine   100 mcg po daily    Gastroesophageal reflux disease without esophagitis  10/13/23 continue protonix 40 mg podaily      10/20/23 dysphagia diet     Essential hypertension  10/13/23 continue clonidone 0.1 mg po daily      10/16/23 stable 140/70    10/20 stable    VTE Risk Mitigation (From admission, onward)         Ordered     apixaban tablet 5 mg  2 times daily         10/20/23 0853     IP VTE LOW RISK PATIENT  Once         10/13/23 2008     Place ULISES hose  Until discontinued         10/13/23 2008                Discharge Planning   DAVID: 11/6/2023     Code Status: Full Code   Is the patient medically ready for discharge?:     Reason for patient still in hospital (select all that apply): Patient trending condition  Discharge Plan A: Home, Home Health                  JERO Mejia  Department of Hospital Medicine   [facility] - [facility] Surgical Unit

## 2023-10-29 NOTE — ASSESSMENT & PLAN NOTE
Patient has elevated troponin repeat EKGs mildly elevated but he has need for emergent surgery echocardiogram has been done we consulted Cardiology

## 2023-10-29 NOTE — SUBJECTIVE & OBJECTIVE
Past Medical History:   Diagnosis Date    Back injury     CHF (congestive heart failure)     Hypertension     Thyroid disease        Past Surgical History:   Procedure Laterality Date    CHOLECYSTECTOMY      SHOULDER SURGERY      THYROID SURGERY         Review of patient's allergies indicates:  No Known Allergies    Family History    None       Tobacco Use    Smoking status: Never    Smokeless tobacco: Never   Substance and Sexual Activity    Alcohol use: Never    Drug use: Never    Sexual activity: Not Currently         Review of Systems   Constitutional:  Negative for activity change and appetite change.   HENT:  Negative for congestion and dental problem.    Eyes:  Negative for discharge and itching.   Respiratory:  Negative for apnea and chest tightness.    Cardiovascular:  Negative for chest pain and leg swelling.   Gastrointestinal:  Negative for abdominal distention.   Endocrine: Negative for cold intolerance and heat intolerance.   Genitourinary:  Negative for difficulty urinating and dysuria.   Musculoskeletal:  Negative for arthralgias and back pain.   Skin:  Negative for color change.   Allergic/Immunologic: Negative for environmental allergies.   Neurological:  Negative for dizziness and facial asymmetry.   Hematological:  Negative for adenopathy. Does not bruise/bleed easily.   Psychiatric/Behavioral:  Negative for agitation and behavioral problems.      Objective:     Vital Signs (Most Recent):  Resp: 18 (10/29/23 0741) Vital Signs (24h Range):  Temp:  [97.9 °F (36.6 °C)-98.3 °F (36.8 °C)] 98 °F (36.7 °C)  Pulse:  [] 100  Resp:  [18-22] 18  SpO2:  [95 %-99 %] 95 %  BP: (150-201)/() 184/79        There is no height or weight on file to calculate BMI.    No intake or output data in the 24 hours ending 10/29/23 0900     Physical Exam  Vitals reviewed.   Constitutional:       Appearance: Normal appearance.      Interventions: He is not intubated.  HENT:      Head: Normocephalic and atraumatic.       Nose: Nose normal.      Mouth/Throat:      Mouth: Mucous membranes are dry.      Pharynx: Oropharynx is clear.   Eyes:      Extraocular Movements: Extraocular movements intact.      Conjunctiva/sclera: Conjunctivae normal.      Pupils: Pupils are equal, round, and reactive to light.   Cardiovascular:      Rate and Rhythm: Normal rate.      Heart sounds: Normal heart sounds. No murmur heard.  Pulmonary:      Effort: Pulmonary effort is normal. He is not intubated.      Breath sounds: Normal breath sounds.   Abdominal:      General: Abdomen is flat. Bowel sounds are normal.      Palpations: Abdomen is soft.      Tenderness: There is abdominal tenderness.   Musculoskeletal:         General: Normal range of motion.      Cervical back: Normal range of motion and neck supple.      Right lower leg: No edema.      Left lower leg: No edema.   Skin:     General: Skin is warm and dry.      Capillary Refill: Capillary refill takes less than 2 seconds.   Neurological:      General: No focal deficit present.      Mental Status: He is alert and oriented to person, place, and time.   Psychiatric:         Mood and Affect: Mood normal.         Behavior: Behavior normal.          Vents:       Lines/Drains/Airways       Peripheral Intravenous Line  Duration                  Peripheral IV - Single Lumen 10/18/23 2141 20 G Anterior;Right Forearm 10 days         Peripheral IV - Single Lumen 10/29/23 0126 20 G Left;Posterior Hand <1 day                    Significant Labs:    CBC/Anemia Profile:  Recent Labs   Lab 10/28/23  2347   WBC 5.57   HGB 12.4*   HCT 37.6*      MCV 94.5   RDW 14.1        Chemistries:  Recent Labs   Lab 10/28/23  2347      K 3.2*      CO2 29   BUN 9   CREATININE 1.08   CALCIUM 9.3   ALBUMIN 2.9*   PROT 6.8   BILITOT 0.6   ALKPHOS 145*   ALT 17   AST 23       Recent Lab Results         10/29/23  0608   10/29/23  0258   10/28/23  2347        Albumin/Globulin Ratio     0.7       Albumin     2.9        ALP     145       ALT     17       Anion Gap     10       AST     23       Baso #     0.03       Basophil %     0.5       BILIRUBIN TOTAL     0.6       BUN     9       BUN/CREAT RATIO     8       Calcium     9.3       Chloride     100       CO2     29       Creatinine     1.08       Differential Method     Auto       eGFR     68       Eos #     0.02       Eosinophil %     0.4       Globulin, Total     3.9       Glucose     119       Hematocrit     37.6       Hemoglobin     12.4       Lymph #     0.94       Lymph %     16.9       MCH     31.2       MCHC     33.0       MCV     94.5       Mono #     0.70       Mono %     12.6       MPV     8.6       Neutrophils, Abs     3.88       Neutrophils Relative     69.6       Platelet Count     267       Potassium     3.2       PROTEIN TOTAL     6.8       RBC     3.98       RDW     14.1       Sodium     136       Troponin I High Sensitivity 99.0   31.3   11.7       WBC     5.57               Significant Imaging:   I have reviewed all pertinent imaging results/findings within the past 24 hours.

## 2023-10-29 NOTE — PROGRESS NOTES
Brief Cardiology Note:  Cardiology consult placed in Epic for mild troponin elevation (31 ->99-> third troponin pending)  Patient is already in OR for emergent surgery for gastric volvulus.   Patient will be seen and evaluated post-op    Gala Veronica MD  Cardiology

## 2023-10-29 NOTE — OP NOTE
[facility]  Surgery Department  Operative Note    SUMMARY     Date of Procedure: 10/29/2023     Procedure: Procedure(s) (LRB):  XI ROBOTIC REPAIR, HERNIA, HIATAL (N/A)     Surgeon(s) and Role:     * Randy Long MD - Primary    Assisting Surgeon: None    Pre-Operative Diagnosis: Gastric volvulus [K31.89]    Post-Operative Diagnosis: Post-Op Diagnosis Codes:     * Gastric volvulus [K31.89]    Anesthesia: General    Procedures Performed: Robotic hiatal hernia repair with Wu Fundoplication    Significant Findings of the Procedure: Over half the stomach trapped within hernia with distention    Procedure in Detail:  After informed consent was obtained patient was brought to the OR prepped and draped in the usual sterile fashion. We started off by optically inserting a 5 mm trocar at the left upper quadrant. Pneumoperitoneum was obtained up to 15 mmHg of pressure. We then under direct visualization placed an two additional robotic 8 mm trochars in the left subcostal area and another 8 mm in the right subcostal area. We then placed a [place]yenne retractor in the subxiphoid area to retract the left lobe of the liver. We then placed the patient in reverse Trendelenburg and docked the robot. We placed a fenestrated bipolar in port #1, a camera in port #2, the vessel sealer in port #3, and the fenestrated tip up in port #4.  We were able to use traction to pull back half the stomach which was caught up in the hernia.  Once we reduced it we could appreciate a gush of fluid that was trapped up there with some serous fluid around it as well.  We then began taking down the proximal part of the greater curvature omentum. We continued this all the way up including the short gastrics of the greater curvature. We brought this all the way up to the left lauri and the esophagus. We also used the vessel sealer to take the gastrohepatic ligament and that we went down and identified the right lauri the diaphragm. We  dissected out the left and the right lauri of the diaphragm and isolated out the esophagus. We pulled down the stomach back into the abdominal cavity.  A 5 cm centimeter hiatal hernia was appreciated. We dissected out the sac and was able to pull the stomach and esophagus back well into the abdominal cavity.  We then reapproximated the posterior portion of the lauri with some 2-0 Ethibond sutures.  We also used a #1 Stratafix permanent suture anteriorly to close the lauri.  We then felt that we could easily get half a grasper through and felt that this enough to allow for passage of food. We then turned our attention to the fundoplication portion of the case.  We then did a Wu fundoplication suturing the fundus to the lower esophagus and upper part of the stomach on the right lateral aspect..  We then using 3 additional 2-0 Ethibond sutures for our fundoplication.  This suturing was about 1-1/2 cm in length. We felt this good wrap and there was some mild laxity to allow again for food passed through. We then ensured hemostasis removed our needles and inspected our work. We watched the Cheyenne retractor coming out. Pneumoperitoneum was discontinued we closed our skin incisions with a 4 Monocryl subcuticular manner and injected local. Patient tolerated the procedure well.      Complications: No    Estimated Blood Loss (EBL): 30 mL           Implants: * No implants in log *    Specimens:   Specimen (24h ago, onward)      None                    Condition: Good    Disposition: PACU - hemodynamically stable.    Attestation: I was present and scrubbed for the entire procedure.

## 2023-10-30 ENCOUNTER — HOSPITAL ENCOUNTER (INPATIENT)
Facility: HOSPITAL | Age: 83
LOS: 1 days | Discharge: ANOTHER HOSPITAL | DRG: 948 | End: 2023-10-31
Attending: FAMILY MEDICINE | Admitting: FAMILY MEDICINE
Payer: MEDICARE

## 2023-10-30 VITALS
DIASTOLIC BLOOD PRESSURE: 67 MMHG | WEIGHT: 170 LBS | HEART RATE: 85 BPM | SYSTOLIC BLOOD PRESSURE: 142 MMHG | RESPIRATION RATE: 24 BRPM | TEMPERATURE: 99 F | BODY MASS INDEX: 23.8 KG/M2 | HEIGHT: 71 IN | OXYGEN SATURATION: 93 %

## 2023-10-30 DIAGNOSIS — R53.1 GENERALIZED WEAKNESS: ICD-10-CM

## 2023-10-30 DIAGNOSIS — S72.142A CLOSED INTERTROCHANTERIC FRACTURE, LEFT, INITIAL ENCOUNTER: Primary | ICD-10-CM

## 2023-10-30 PROBLEM — Z87.19 HISTORY OF REPAIR OF HIATAL HERNIA: Status: ACTIVE | Noted: 2023-10-30

## 2023-10-30 PROBLEM — Z98.890 HISTORY OF REPAIR OF HIATAL HERNIA: Status: ACTIVE | Noted: 2023-10-30

## 2023-10-30 LAB
ANION GAP SERPL CALCULATED.3IONS-SCNC: 11 MMOL/L (ref 7–16)
AORTIC ROOT ANNULUS: 3.15 CM
AORTIC VALVE CUSP SEPERATION: 2.32 CM
AV INDEX (PROSTH): 0.89
AV MEAN GRADIENT: 3 MMHG
AV PEAK GRADIENT: 4 MMHG
AV VALVE AREA BY VELOCITY RATIO: 3.4 CM²
AV VALVE AREA: 3.04 CM²
AV VELOCITY RATIO: 1
BASOPHILS # BLD AUTO: 0.06 K/UL (ref 0–0.2)
BASOPHILS NFR BLD AUTO: 0.5 % (ref 0–1)
BUN SERPL-MCNC: 19 MG/DL (ref 7–18)
BUN/CREAT SERPL: 16 (ref 6–20)
CALCIUM SERPL-MCNC: 8.5 MG/DL (ref 8.5–10.1)
CHLORIDE SERPL-SCNC: 100 MMOL/L (ref 98–107)
CO2 SERPL-SCNC: 26 MMOL/L (ref 21–32)
CREAT SERPL-MCNC: 1.17 MG/DL (ref 0.7–1.3)
CV ECHO LV RWT: 0.6 CM
DIFFERENTIAL METHOD BLD: ABNORMAL
DOP CALC AO PEAK VEL: 1.06 M/S
DOP CALC AO VTI: 17.1 CM
DOP CALC LVOT AREA: 3.4 CM2
DOP CALC LVOT DIAMETER: 2.08 CM
DOP CALC LVOT PEAK VEL: 1.06 M/S
DOP CALC LVOT STROKE VOLUME: 51.96 CM3
DOP CALCLVOT PEAK VEL VTI: 15.3 CM
E WAVE DECELERATION TIME: 149.5 MSEC
E/A RATIO: 0.6
E/E' RATIO: 7.38 M/S
ECHO LV POSTERIOR WALL: 1.3 CM (ref 0.6–1.1)
EGFR (NO RACE VARIABLE) (RUSH/TITUS): 61 ML/MIN/1.73M2
EOSINOPHIL # BLD AUTO: 0.01 K/UL (ref 0–0.5)
EOSINOPHIL NFR BLD AUTO: 0.1 % (ref 1–4)
ERYTHROCYTE [DISTWIDTH] IN BLOOD BY AUTOMATED COUNT: 14.7 % (ref 11.5–14.5)
FRACTIONAL SHORTENING: 22 % (ref 28–44)
GLUCOSE SERPL-MCNC: 94 MG/DL (ref 74–106)
HCT VFR BLD AUTO: 33.8 % (ref 40–54)
HGB BLD-MCNC: 11.2 G/DL (ref 13.5–18)
IMM GRANULOCYTES # BLD AUTO: 0.03 K/UL (ref 0–0.04)
IMM GRANULOCYTES NFR BLD: 0.2 % (ref 0–0.4)
INTERVENTRICULAR SEPTUM: 1.5 CM (ref 0.6–1.1)
IVC DIAMETER: 1.87 CM
LA MAJOR: 3.17 CM
LEFT ATRIUM SIZE: 2.28 CM
LEFT INTERNAL DIMENSION IN SYSTOLE: 3.35 CM (ref 2.1–4)
LEFT VENTRICLE DIASTOLIC VOLUME INDEX: 47.34 ML/M2
LEFT VENTRICLE DIASTOLIC VOLUME: 92.79 ML
LEFT VENTRICLE MASS INDEX: 118 G/M2
LEFT VENTRICLE SYSTOLIC VOLUME INDEX: 23.4 ML/M2
LEFT VENTRICLE SYSTOLIC VOLUME: 45.84 ML
LEFT VENTRICULAR INTERNAL DIMENSION IN DIASTOLE: 4.3 CM (ref 3.5–6)
LEFT VENTRICULAR MASS: 232.23 G
LV LATERAL E/E' RATIO: 6.86 M/S
LV SEPTAL E/E' RATIO: 8 M/S
LVOT MG: 1.63 MMHG
LVOT MV: 0.59 CM/S
LYMPHOCYTES # BLD AUTO: 0.86 K/UL (ref 1–4.8)
LYMPHOCYTES NFR BLD AUTO: 7.1 % (ref 27–41)
LYMPHOCYTES NFR BLD MANUAL: 6 % (ref 27–41)
MCH RBC QN AUTO: 30.9 PG (ref 27–31)
MCHC RBC AUTO-ENTMCNC: 33.1 G/DL (ref 32–36)
MCV RBC AUTO: 93.1 FL (ref 80–96)
MONOCYTES # BLD AUTO: 0.96 K/UL (ref 0–0.8)
MONOCYTES NFR BLD AUTO: 8 % (ref 2–6)
MONOCYTES NFR BLD MANUAL: 5 % (ref 2–6)
MPC BLD CALC-MCNC: 9.1 FL (ref 9.4–12.4)
MV PEAK A VEL: 0.8 M/S
MV PEAK E VEL: 0.48 M/S
MV STENOSIS PRESSURE HALF TIME: 43.35 MS
MV VALVE AREA P 1/2 METHOD: 5.07 CM2
NEUTROPHILS # BLD AUTO: 10.13 K/UL (ref 1.8–7.7)
NEUTROPHILS NFR BLD AUTO: 84.1 % (ref 53–65)
NEUTS BAND NFR BLD MANUAL: 25 % (ref 1–5)
NEUTS SEG NFR BLD MANUAL: 64 % (ref 50–62)
NRBC # BLD AUTO: 0 X10E3/UL
NRBC, AUTO (.00): 0 %
OHS LV EJECTION FRACTION SIMPSONS BIPLANE MOD: 45 %
OVALOCYTES BLD QL SMEAR: ABNORMAL
PISA TR MAX VEL: 2.63 M/S
PLATELET # BLD AUTO: 280 K/UL (ref 150–400)
PLATELET MORPHOLOGY: NORMAL
POTASSIUM SERPL-SCNC: 4.2 MMOL/L (ref 3.5–5.1)
PV PEAK GRADIENT: 13 MMHG
PV PEAK VELOCITY: 1.81 M/S
RA MAJOR: 3.89 CM
RA PRESSURE ESTIMATED: 3 MMHG
RBC # BLD AUTO: 3.63 M/UL (ref 4.6–6.2)
RIGHT VENTRICULAR END-DIASTOLIC DIMENSION: 3.57 CM
RV TB RVSP: 6 MMHG
SODIUM SERPL-SCNC: 133 MMOL/L (ref 136–145)
TDI LATERAL: 0.07 M/S
TDI SEPTAL: 0.06 M/S
TDI: 0.07 M/S
TR MAX PG: 28 MMHG
TRICUSPID ANNULAR PLANE SYSTOLIC EXCURSION: 2.24 CM
TV REST PULMONARY ARTERY PRESSURE: 31 MMHG
WBC # BLD AUTO: 12.05 K/UL (ref 4.5–11)
Z-SCORE OF LEFT VENTRICULAR DIMENSION IN END DIASTOLE: -2.66
Z-SCORE OF LEFT VENTRICULAR DIMENSION IN END SYSTOLE: -0.23

## 2023-10-30 PROCEDURE — 25000003 PHARM REV CODE 250: Performed by: INTERNAL MEDICINE

## 2023-10-30 PROCEDURE — 85025 COMPLETE CBC W/AUTO DIFF WBC: CPT | Performed by: INTERNAL MEDICINE

## 2023-10-30 PROCEDURE — 99305 PR NURSING FACILITY CARE, INIT, MOD SEVERITY: ICD-10-PCS | Mod: AI,,, | Performed by: FAMILY MEDICINE

## 2023-10-30 PROCEDURE — 63600175 PHARM REV CODE 636 W HCPCS: Performed by: INTERNAL MEDICINE

## 2023-10-30 PROCEDURE — 93010 EKG 12-LEAD: ICD-10-PCS | Mod: ,,, | Performed by: INTERNAL MEDICINE

## 2023-10-30 PROCEDURE — 80048 BASIC METABOLIC PNL TOTAL CA: CPT | Performed by: INTERNAL MEDICINE

## 2023-10-30 PROCEDURE — 93005 ELECTROCARDIOGRAM TRACING: CPT

## 2023-10-30 PROCEDURE — 99305 1ST NF CARE MODERATE MDM 35: CPT | Mod: AI,,, | Performed by: FAMILY MEDICINE

## 2023-10-30 PROCEDURE — 94761 N-INVAS EAR/PLS OXIMETRY MLT: CPT

## 2023-10-30 PROCEDURE — 25000003 PHARM REV CODE 250: Performed by: NURSE PRACTITIONER

## 2023-10-30 PROCEDURE — 93010 ELECTROCARDIOGRAM REPORT: CPT | Mod: ,,, | Performed by: INTERNAL MEDICINE

## 2023-10-30 PROCEDURE — 11000004 HC SNF PRIVATE

## 2023-10-30 PROCEDURE — 99233 SBSQ HOSP IP/OBS HIGH 50: CPT | Mod: ,,, | Performed by: INTERNAL MEDICINE

## 2023-10-30 PROCEDURE — C9113 INJ PANTOPRAZOLE SODIUM, VIA: HCPCS | Performed by: INTERNAL MEDICINE

## 2023-10-30 PROCEDURE — 99233 PR SUBSEQUENT HOSPITAL CARE,LEVL III: ICD-10-PCS | Mod: ,,, | Performed by: INTERNAL MEDICINE

## 2023-10-30 RX ORDER — FLECAINIDE ACETATE 50 MG/1
150 TABLET ORAL EVERY 12 HOURS
Status: DISCONTINUED | OUTPATIENT
Start: 2023-10-30 | End: 2023-10-31 | Stop reason: HOSPADM

## 2023-10-30 RX ORDER — GLUCOSAM/CHONDRO/HERB 149/HYAL 750-100 MG
1 TABLET ORAL DAILY
Status: DISCONTINUED | OUTPATIENT
Start: 2023-10-31 | End: 2023-10-31 | Stop reason: HOSPADM

## 2023-10-30 RX ORDER — CALCIUM CARBONATE 200(500)MG
500 TABLET,CHEWABLE ORAL 2 TIMES DAILY PRN
Status: DISCONTINUED | OUTPATIENT
Start: 2023-10-30 | End: 2023-10-31 | Stop reason: HOSPADM

## 2023-10-30 RX ORDER — CLONIDINE HYDROCHLORIDE 0.1 MG/1
0.1 TABLET ORAL DAILY
Status: DISCONTINUED | OUTPATIENT
Start: 2023-10-31 | End: 2023-10-31 | Stop reason: HOSPADM

## 2023-10-30 RX ORDER — LEVOTHYROXINE SODIUM 100 UG/1
100 TABLET ORAL
Qty: 30 TABLET | Refills: 11 | Status: SHIPPED | OUTPATIENT
Start: 2023-10-31 | End: 2024-10-30

## 2023-10-30 RX ORDER — TALC
6 POWDER (GRAM) TOPICAL NIGHTLY PRN
Status: DISCONTINUED | OUTPATIENT
Start: 2023-10-30 | End: 2023-10-31 | Stop reason: HOSPADM

## 2023-10-30 RX ORDER — PANTOPRAZOLE SODIUM 40 MG/1
40 TABLET, DELAYED RELEASE ORAL DAILY
Status: DISCONTINUED | OUTPATIENT
Start: 2023-10-31 | End: 2023-10-31 | Stop reason: HOSPADM

## 2023-10-30 RX ORDER — AMOXICILLIN 250 MG
1 CAPSULE ORAL 2 TIMES DAILY
Status: DISCONTINUED | OUTPATIENT
Start: 2023-10-30 | End: 2023-10-31 | Stop reason: HOSPADM

## 2023-10-30 RX ORDER — UBIDECARENONE 75 MG
1000 CAPSULE ORAL DAILY
Status: DISCONTINUED | OUTPATIENT
Start: 2023-10-31 | End: 2023-10-31 | Stop reason: HOSPADM

## 2023-10-30 RX ORDER — TALC
6 POWDER (GRAM) TOPICAL NIGHTLY PRN
Status: DISCONTINUED | OUTPATIENT
Start: 2023-10-30 | End: 2023-10-30

## 2023-10-30 RX ORDER — ARIPIPRAZOLE 5 MG/1
5 TABLET ORAL DAILY
Qty: 30 TABLET | Refills: 11 | Status: SHIPPED | OUTPATIENT
Start: 2023-10-31 | End: 2024-10-30

## 2023-10-30 RX ORDER — CHOLECALCIFEROL (VITAMIN D3) 25 MCG
1000 TABLET ORAL DAILY
Status: DISCONTINUED | OUTPATIENT
Start: 2023-10-31 | End: 2023-10-31 | Stop reason: HOSPADM

## 2023-10-30 RX ORDER — POLYETHYLENE GLYCOL 3350 17 G/17G
17 POWDER, FOR SOLUTION ORAL DAILY
Status: DISCONTINUED | OUTPATIENT
Start: 2023-10-31 | End: 2023-10-31 | Stop reason: HOSPADM

## 2023-10-30 RX ORDER — CITALOPRAM 40 MG/1
40 TABLET, FILM COATED ORAL DAILY
Qty: 30 TABLET | Refills: 11
Start: 2023-10-31 | End: 2024-10-30

## 2023-10-30 RX ORDER — ARIPIPRAZOLE 5 MG/1
5 TABLET ORAL DAILY
Status: DISCONTINUED | OUTPATIENT
Start: 2023-11-01 | End: 2023-10-31 | Stop reason: HOSPADM

## 2023-10-30 RX ORDER — OXYBUTYNIN CHLORIDE 5 MG/1
15 TABLET, EXTENDED RELEASE ORAL DAILY
Status: DISCONTINUED | OUTPATIENT
Start: 2023-10-31 | End: 2023-10-31 | Stop reason: HOSPADM

## 2023-10-30 RX ORDER — VITAMIN E 268 MG
400 CAPSULE ORAL DAILY
Status: DISCONTINUED | OUTPATIENT
Start: 2023-10-31 | End: 2023-10-31 | Stop reason: HOSPADM

## 2023-10-30 RX ORDER — ASPIRIN 81 MG/1
81 TABLET ORAL NIGHTLY
Status: DISCONTINUED | OUTPATIENT
Start: 2023-10-30 | End: 2023-10-31 | Stop reason: HOSPADM

## 2023-10-30 RX ORDER — ASPIRIN 81 MG/1
81 TABLET ORAL NIGHTLY
Refills: 0
Start: 2023-10-30 | End: 2024-10-29

## 2023-10-30 RX ORDER — LANOLIN ALCOHOL/MO/W.PET/CERES
1000 CREAM (GRAM) TOPICAL DAILY
Start: 2023-10-31

## 2023-10-30 RX ORDER — LEVOTHYROXINE SODIUM 100 UG/1
100 TABLET ORAL
Status: DISCONTINUED | OUTPATIENT
Start: 2023-11-01 | End: 2023-10-31 | Stop reason: HOSPADM

## 2023-10-30 RX ORDER — ACETAMINOPHEN 325 MG/1
650 TABLET ORAL EVERY 6 HOURS PRN
Status: DISCONTINUED | OUTPATIENT
Start: 2023-10-30 | End: 2023-10-31

## 2023-10-30 RX ORDER — LANOLIN ALCOHOL/MO/W.PET/CERES
100 CREAM (GRAM) TOPICAL DAILY
Status: DISCONTINUED | OUTPATIENT
Start: 2023-10-31 | End: 2023-10-31 | Stop reason: HOSPADM

## 2023-10-30 RX ORDER — CITALOPRAM 20 MG/1
40 TABLET, FILM COATED ORAL DAILY
Status: DISCONTINUED | OUTPATIENT
Start: 2023-11-01 | End: 2023-10-31 | Stop reason: HOSPADM

## 2023-10-30 RX ORDER — TALC
6 POWDER (GRAM) TOPICAL NIGHTLY PRN
Refills: 0
Start: 2023-10-30

## 2023-10-30 RX ADMIN — THERA TABS 1 TABLET: TAB at 08:10

## 2023-10-30 RX ADMIN — CITALOPRAM HYDROBROMIDE 40 MG: 20 TABLET ORAL at 08:10

## 2023-10-30 RX ADMIN — ASPIRIN 81 MG: 81 TABLET, COATED ORAL at 09:10

## 2023-10-30 RX ADMIN — MUPIROCIN: 20 OINTMENT TOPICAL at 08:10

## 2023-10-30 RX ADMIN — FLECAINIDE ACETATE 150 MG: 50 TABLET ORAL at 08:10

## 2023-10-30 RX ADMIN — PANTOPRAZOLE SODIUM 40 MG: 40 INJECTION, POWDER, FOR SOLUTION INTRAVENOUS at 08:10

## 2023-10-30 RX ADMIN — MORPHINE SULFATE 4 MG: 4 INJECTION, SOLUTION INTRAMUSCULAR; INTRAVENOUS at 12:10

## 2023-10-30 RX ADMIN — LEVOTHYROXINE SODIUM 100 MCG: 100 TABLET ORAL at 05:10

## 2023-10-30 RX ADMIN — MELATONIN TAB 3 MG 6 MG: 3 TAB at 09:10

## 2023-10-30 RX ADMIN — ARIPIPRAZOLE 5 MG: 5 TABLET ORAL at 09:10

## 2023-10-30 RX ADMIN — MORPHINE SULFATE 4 MG: 4 INJECTION, SOLUTION INTRAMUSCULAR; INTRAVENOUS at 05:10

## 2023-10-30 RX ADMIN — SENNOSIDES AND DOCUSATE SODIUM 1 TABLET: 50; 8.6 TABLET ORAL at 09:10

## 2023-10-30 RX ADMIN — FLECAINIDE ACETATE 150 MG: 50 TABLET ORAL at 09:10

## 2023-10-30 RX ADMIN — Medication 1000 MCG: at 08:10

## 2023-10-30 RX ADMIN — APIXABAN 5 MG: 2.5 TABLET, FILM COATED ORAL at 09:10

## 2023-10-30 NOTE — HPI
83-year-old male with multiple medical problems including hypertension atrial fibrillation CHF anxiety depression pulmonary embolus in February 2023 hypothyroidism cholecystectomy was recently in [facility] a Taty-Colon tear had 2 hemostatic clips placed and was at [facility] for rehab.  On 1029 patient had acute onset of vomiting and chest pain he reports he begin with nausea vomiting and then developed chest pain he reports pain is lower anterior chest nonradiating denies shortness of breath palpitations lightheadedness and CT showed concern for developing gastric volvulus will sent down here for further evaluation by Dr. Ambrosio patient was also noted to have a mildly elevated troponin 2nd check EKGs pending for me at this time patient denies any shortness of breath and no chest pain at this time          Hospital Course:  Mr King has met maximal benefit from this hospitalization. He has undergone surgical intervention for the volvulus. He is stable today with no complaints. Pain is controlled. Surgery is ok with patient being discharged and returning to [facility] for continued therapy. Patient and family deny any needs or complaints at present. Plan is to discharge back to [facility] today.      Above info is from [facility] stay. Mr. King has been accepted to swing bed at [facility]. His arrival is expected today.

## 2023-10-30 NOTE — ASSESSMENT & PLAN NOTE
10/30/23 daily weights, intake and output     Echo    Interpretation Summary    Left Ventricle: The left ventricle is normal in size. Increased ventricular mass. Increased wall thickness. There is concentric hypertrophy. Normal wall motion. There is mildly reduced systolic function with a visually estimated ejection fraction of 40 - 45%. Biplane (2D) method of discs ejection fraction is 45%. Unable to assess diastolic function due to atrial fibrillation.    Left Atrium: Normal left atrial size. There is an echolucency present in the left atrium visualized best on apical 4 chamber views, however,  no filling defect visualized with Echo contrast. Likely suggestive of hiatal hernia.    Right Ventricle: Normal right ventricular cavity size. Systolic function is normal.    Left Atrium: Normal left atrial size. There is an echolucency present in the left atrium visualized best on apical 4 chamber views, however, no filling defect visualized with Echo contrast. Likely suggestive of hiatal hernia.    Aortic Valve: The aortic valve is a trileaflet valve. Mildly calcified cusps.    Mitral Valve: There is mild bileaflet sclerosis.    Tricuspid Valve: There is mild regurgitation with an eccentrically directed jet.    Pulmonary Artery: The estimated pulmonary artery systolic pressure is 31 mmHg.    IVC/SVC: Normal venous pressure at 3 mmHg.  .

## 2023-10-30 NOTE — SUBJECTIVE & OBJECTIVE
Past Medical History:   Diagnosis Date    Back injury     CHF (congestive heart failure)     Hypertension     Thyroid disease        Past Surgical History:   Procedure Laterality Date    CHOLECYSTECTOMY      SHOULDER SURGERY      THYROID SURGERY         Review of patient's allergies indicates:  No Known Allergies    Family History    None       Tobacco Use    Smoking status: Never    Smokeless tobacco: Never   Substance and Sexual Activity    Alcohol use: Never    Drug use: Never    Sexual activity: Not Currently         Review of Systems   Constitutional:  Negative for chills, fever, weight gain and weight loss.   Respiratory:  Negative for cough, shortness of breath and wheezing.    Cardiovascular:  Negative for chest pain, dyspnea on exertion, palpitations, orthopnea, leg swelling, syncope, PND and near-syncope.   Gastrointestinal:  Negative for abdominal pain and diarrhea.     Objective:     Vital Signs (Most Recent):  Temp: 98.1 °F (36.7 °C) (10/29/23 1914)  Pulse: 107 (10/29/23 1700)  Resp: (!) 24 (10/29/23 1700)  BP: 123/71 (10/29/23 1700)  SpO2: 95 % (10/29/23 1700) Vital Signs (24h Range):  Temp:  [97.9 °F (36.6 °C)-98.3 °F (36.8 °C)] 98.1 °F (36.7 °C)  Pulse:  [] 107  Resp:  [13-25] 24  SpO2:  [90 %-99 %] 95 %  BP: (108-201)/() 123/71     Weight: 76.2 kg (168 lb)  Body mass index is 23.43 kg/m².      Intake/Output Summary (Last 24 hours) at 10/29/2023 2229  Last data filed at 10/29/2023 1709  Gross per 24 hour   Intake 1801.25 ml   Output 30 ml   Net 1771.25 ml        Physical Exam  Vitals reviewed.   Constitutional:       Appearance: Normal appearance.      Interventions: He is not intubated.  HENT:      Head: Normocephalic and atraumatic.      Nose: Nose normal.      Mouth/Throat:      Mouth: Mucous membranes are dry.      Pharynx: Oropharynx is clear.   Eyes:      Extraocular Movements: Extraocular movements intact.      Conjunctiva/sclera: Conjunctivae normal.      Pupils: Pupils are equal,  round, and reactive to light.   Cardiovascular:      Rate and Rhythm: Normal rate.      Heart sounds: Normal heart sounds. No murmur heard.  Pulmonary:      Effort: Pulmonary effort is normal. He is not intubated.      Breath sounds: Normal breath sounds.   Abdominal:      General: Abdomen is flat. Bowel sounds are normal.      Palpations: Abdomen is soft.      Tenderness: There is abdominal tenderness.   Musculoskeletal:         General: Normal range of motion.      Cervical back: Normal range of motion and neck supple.      Right lower leg: No edema.      Left lower leg: No edema.   Skin:     General: Skin is warm and dry.      Capillary Refill: Capillary refill takes less than 2 seconds.   Neurological:      General: No focal deficit present.      Mental Status: He is alert and oriented to person, place, and time.   Psychiatric:         Mood and Affect: Mood normal.         Behavior: Behavior normal.          Vents:       Lines/Drains/Airways       Peripheral Intravenous Line  Duration                  Peripheral IV - Single Lumen 10/18/23 2141 20 G Anterior;Right Forearm 11 days         Peripheral IV - Single Lumen 10/29/23 0126 20 G Left;Posterior Hand <1 day                    Review of Systems   Constitutional: Negative for chills, fever, weight gain and weight loss.   Cardiovascular:  Negative for chest pain, dyspnea on exertion, irregular heartbeat, leg swelling, near-syncope, orthopnea, palpitations, paroxysmal nocturnal dyspnea and syncope.   Respiratory:  Negative for cough, shortness of breath and wheezing.    Gastrointestinal:  Negative for abdominal pain and diarrhea.     Physical Exam  Vitals reviewed.   Constitutional:       Appearance: Normal appearance.      Interventions: He is not intubated.  HENT:      Head: Normocephalic and atraumatic.      Nose: Nose normal.      Mouth/Throat:      Mouth: Mucous membranes are dry.      Pharynx: Oropharynx is clear.   Eyes:      Extraocular Movements:  Extraocular movements intact.      Conjunctiva/sclera: Conjunctivae normal.      Pupils: Pupils are equal, round, and reactive to light.   Cardiovascular:      Rate and Rhythm: Normal rate.      Heart sounds: Normal heart sounds. No murmur heard.  Pulmonary:      Effort: Pulmonary effort is normal. He is not intubated.      Breath sounds: Normal breath sounds.   Abdominal:      General: Abdomen is flat. Bowel sounds are normal.      Palpations: Abdomen is soft.      Tenderness: There is abdominal tenderness.   Musculoskeletal:         General: Normal range of motion.      Cervical back: Normal range of motion and neck supple.      Right lower leg: No edema.      Left lower leg: No edema.   Skin:     General: Skin is warm and dry.      Capillary Refill: Capillary refill takes less than 2 seconds.   Neurological:      General: No focal deficit present.      Mental Status: He is alert and oriented to person, place, and time.   Psychiatric:         Mood and Affect: Mood normal.         Behavior: Behavior normal.       I have reviewed pertinent labs and imaging from the last 24 hours.    Results for orders placed during the hospital encounter of 10/29/23    Echo    Interpretation Summary    Left Ventricle: The left ventricle is normal in size. Increased ventricular mass. Increased wall thickness. There is concentric hypertrophy. regional wall motion abnormalities present. There is mildly reduced systolic function with a visually estimated ejection fraction of 40 - 45%. Biplane (2D) method of discs ejection fraction is 40%. Unable to assess diastolic function due to atrial fibrillation.    Left Atrium: Normal left atrial size. There is an echolucency present in the left atrium visualized best on apical 4 chamber views, however, not visualized with administration of Echo contrast, hence, likely suggestive of artifact vs mass or thrombus.    Right Ventricle: Normal right ventricular cavity size. Systolic function is  normal.    Aortic Valve: The aortic valve is a trileaflet valve. Mildly calcified cusps.    Mitral Valve: There is mild bileaflet sclerosis.    Tricuspid Valve: There is mild regurgitation with an eccentrically directed jet.    Pulmonary Artery: The estimated pulmonary artery systolic pressure is 31 mmHg.    IVC/SVC: Normal venous pressure at 3 mmHg.

## 2023-10-30 NOTE — ASSESSMENT & PLAN NOTE
10/30 has hx gi bleed  records from [facility] recent GI bleed - had EGD that [id number] mm bleeding  Taty Colon tear- had 2 hemastatic clips applied. Eliquis was resumed 10/20/23

## 2023-10-30 NOTE — HOSPITAL COURSE
Mr King has met maximal benefit from this hospitalization. He has undergone surgical intervention for the volvulus. He is stable today with no complaints. Pain is controlled. Surgery is ok with patient being discharged and returning to [facility] for continued therapy. Patient and family deny any needs or complaints at present. Plan is to discharge back to [facility] today.

## 2023-10-30 NOTE — SUBJECTIVE & OBJECTIVE
Interval History: Patient w/o complaints on exam. S/p robotic hernia repair. Dsg d/I. Endorses flatus. Tolerating liquids    Medications:  Continuous Infusions:   sodium chloride 0.45% 75 mL/hr at 10/29/23 2344     Scheduled Meds:   apixaban  5 mg Oral BID    ARIPiprazole  5 mg Oral Daily    aspirin  81 mg Oral QHS    citalopram  40 mg Oral Daily    cyanocobalamin  1,000 mcg Oral Daily    flecainide  150 mg Oral Q12H    levothyroxine  100 mcg Oral Before breakfast    multivitamin  1 tablet Oral Daily    mupirocin   Nasal BID    pantoprazole  40 mg Intravenous Daily     PRN Meds:hydrALAZINE, LORazepam, melatonin, morphine, nicotine, ondansetron, sodium chloride 0.9%     Review of patient's allergies indicates:  No Known Allergies  Objective:     Vital Signs (Most Recent):  Temp: 98.7 °F (37.1 °C) (10/30/23 0730)  Pulse: 97 (10/30/23 0730)  Resp: (!) 25 (10/30/23 0730)  BP: 125/69 (10/30/23 0730)  SpO2: (!) 90 % (10/30/23 0730) Vital Signs (24h Range):  Temp:  [97.9 °F (36.6 °C)-98.7 °F (37.1 °C)] 98.7 °F (37.1 °C)  Pulse:  [] 97  Resp:  [14-30] 25  SpO2:  [89 %-95 %] 90 %  BP: ()/(59-76) 125/69     Weight: 77.1 kg (169 lb 15.6 oz)  Body mass index is 23.71 kg/m².    Intake/Output - Last 3 Shifts         10/28 0700  10/29 0659 10/29 0700  10/30 0659 10/30 0700  10/31 0659    I.V. (mL/kg)  1190 (15.4) 301.3 (3.9)    IV Piggyback  1350     Total Intake(mL/kg)  2540 (32.9) 301.3 (3.9)    Blood  30     Total Output  30     Net  +2510 +301.3           Urine Occurrence  4 x              Physical Exam  Vitals reviewed.   Constitutional:       General: He is not in acute distress.     Appearance: He is not ill-appearing or toxic-appearing.   HENT:      Head: Normocephalic and atraumatic.      Nose: Nose normal.   Eyes:      General: No scleral icterus.     Extraocular Movements: Extraocular movements intact.   Cardiovascular:      Rate and Rhythm: Normal rate.      Pulses: Normal pulses.   Pulmonary:      Effort:  Pulmonary effort is normal.      Breath sounds: Normal breath sounds.   Abdominal:      Palpations: Abdomen is soft.      Comments: Dsg d/i   Musculoskeletal:      Cervical back: No rigidity or tenderness.   Skin:     General: Skin is warm and dry.      Capillary Refill: Capillary refill takes less than 2 seconds.      Findings: No rash.   Neurological:      General: No focal deficit present.      Mental Status: He is alert and oriented to person, place, and time.   Psychiatric:         Mood and Affect: Mood normal.         Behavior: Behavior normal.          Significant Labs:  I have reviewed all pertinent lab results within the past 24 hours.    Significant Diagnostics:  I have reviewed all pertinent imaging results/findings within the past 24 hours.

## 2023-10-30 NOTE — PLAN OF CARE
Problem: Adult Inpatient Plan of Care  Goal: Plan of Care Review  Outcome: Ongoing, Progressing  Goal: Patient-Specific Goal (Individualized)  Outcome: Ongoing, Progressing  Goal: Absence of Hospital-Acquired Illness or Injury  Outcome: Ongoing, Progressing  Intervention: Prevent Infection  Flowsheets (Taken 10/30/2023 1800)  Infection Prevention: hand hygiene promoted  Goal: Optimal Comfort and Wellbeing  Outcome: Ongoing, Progressing  Goal: Readiness for Transition of Care  Outcome: Ongoing, Progressing  Intervention: Mutually Develop Transition Plan  Flowsheets (Taken 10/30/2023 1800)  Transportation Anticipated: family or friend will provide     Problem: Impaired Wound Healing  Goal: Optimal Wound Healing  Outcome: Ongoing, Progressing  Intervention: Promote Wound Healing  Flowsheets (Taken 10/30/2023 1800)  Oral Nutrition Promotion: safe use of adaptive equipment encouraged     Problem: Fall Injury Risk  Goal: Absence of Fall and Fall-Related Injury  Outcome: Ongoing, Progressing  Intervention: Identify and Manage Contributors  Flowsheets (Taken 10/30/2023 1800)  Self-Care Promotion: independence encouraged  Medication Review/Management: medications reviewed     Problem: Skin Injury Risk Increased  Goal: Skin Health and Integrity  Outcome: Ongoing, Progressing  Intervention: Optimize Skin Protection  Flowsheets (Taken 10/30/2023 1800)  Head of Bed (HOB) Positioning: HOB elevated  Intervention: Promote and Optimize Oral Intake  Flowsheets (Taken 10/30/2023 1800)  Oral Nutrition Promotion: safe use of adaptive equipment encouraged

## 2023-10-30 NOTE — CONSULTS
[facility]  Cardiology  Consult Note    Patient Name: Joey King  MRN: [id number]  Admission Date: 10/29/2023  Hospital Length of Stay: 1 days  Code Status: Full Code   Attending Provider: Napoleon No MD   Consulting Provider: GALA VERONICA MD  Primary Care Physician: Prashanth Ryan DO  Principal Problem:Gastric volvulus    Patient information was obtained from patient, past medical records and primary team.     Inpatient consult to Cardiology  Consult performed by: Gala Veronica MD  Consult ordered by: Napoleon No MD        Subjective:     Chief Complaint:  Nausea, vomiting     HPI:   83-year-old male with a history of hypertension, atrial fibrillation, CHF, anxiety, depression pulmonary embolus in February 2023, hypothyroidism cholecystectomy was recently in [facility] a Taty-Colon tear had 2 hemostatic clips placed and was at [facility] for rehab.  On 1029 patient had acute onset of vomiting and chest pain he reports he begin with nausea vomiting and then developed chest pain he reports pain is lower anterior chest nonradiating denies shortness of breath palpitations lightheadedness and CT showed concern for developing gastric volvulus will sent down here for further evaluation by Dr. Long. Cardiology consulted for mild troponin elevation (31 ->99-> third troponin pending). Patient is already in OR for emergent surgery for gastric volvulus. Seen post-op. Denies chest pain, nausea or vomiting.         Past Medical History:   Diagnosis Date    Back injury     CHF (congestive heart failure)     Hypertension     Thyroid disease        Past Surgical History:   Procedure Laterality Date    CHOLECYSTECTOMY      ROBOT-ASSISTED REPAIR OF HIATAL HERNIA USING DA NELSON XI N/A 10/29/2023    Procedure: XI ROBOTIC REPAIR, HERNIA, HIATAL;  Surgeon: Randy Long MD;  Location: [facility] OR;  Service: General;  Laterality: N/A;    SHOULDER SURGERY      THYROID SURGERY          Review of patient's allergies indicates:  No Known Allergies    No current facility-administered medications on file prior to encounter.     Current Outpatient Medications on File Prior to Encounter   Medication Sig    ARIPiprazole (ABILIFY) 5 MG Tab 1 tablet.    aspirin (ECOTRIN) 81 MG EC tablet TAKE ONE TABLET BY MOUTH DAILY TO PREVENT BLOOD CLOT    citalopram (CELEXA) 40 MG tablet TAKE ONE TABLET BY MOUTH DAILY FOR DEPRESSION    cloNIDine (CATAPRES) 0.1 MG tablet TAKE ONE TABLET BY MOUTH DAILY FOR HIGH BLOOD PRESSURE    cyanocobalamin (VITAMIN B-12) 100 MCG tablet Take 1,000 mcg by mouth.    flecainide (TAMBOCOR) 150 MG Tab Take 1 tablet (150 mg total) by mouth every 12 (twelve) hours.    levothyroxine (SYNTHROID) 100 MCG tablet TAKE ONE TABLET BY MOUTH DAILY IN THE MORNING FOR THYROID REPLACEMENT    melatonin (MELATIN) Take 3 mg by mouth nightly as needed.    mirabegron (MYRBETRIQ) 50 mg Tb24 Take 1 tablet by mouth once daily.    multivit with minerals/lutein (MULTIVITAMIN 50 PLUS ORAL)     omega-3 fatty acids-fish oil 340-1,000 mg Cap Take 1 capsule by mouth.    omeprazole (PRILOSEC) 40 MG capsule 40 mg.    oxybutynin (DITROPAN XL) 15 MG TR24 Take 1 tablet by mouth once daily.    polyethylene glycol (GLYCOLAX) 17 gram/dose powder Take 17 g by mouth.    pyridoxine, vitamin B6, (B-6) 50 MG Tab Take 100 mg by mouth.    vitamin D (VITAMIN D3) 1000 units Tab TAKE ONE TABLET BY MOUTH DAILY (CHOLECALCIFEROL SAME AS VITAMIN D)    vitamin E 400 UNIT capsule 1 capsule.    zinc 50 mg Tab 1 tablet.    zolpidem (AMBIEN) 10 mg Tab Take 10 mg by mouth.     Family History    None       Tobacco Use    Smoking status: Never    Smokeless tobacco: Never   Substance and Sexual Activity    Alcohol use: Never    Drug use: Never    Sexual activity: Not Currently     Review of Systems   Constitutional: Negative for chills, fever, weight gain and weight loss.   Cardiovascular:  Negative for chest pain, dyspnea on exertion,  irregular heartbeat, leg swelling, near-syncope, orthopnea, palpitations, paroxysmal nocturnal dyspnea and syncope.   Respiratory:  Negative for cough, shortness of breath and wheezing.    Gastrointestinal:  Negative for abdominal pain and diarrhea.     Objective:     Vital Signs (Most Recent):  Temp: 98.7 °F (37.1 °C) (10/30/23 0730)  Pulse: 97 (10/30/23 0730)  Resp: (!) 25 (10/30/23 0730)  BP: 125/69 (10/30/23 0730)  SpO2: (!) 90 % (10/30/23 0730) Vital Signs (24h Range):  Temp:  [97.9 °F (36.6 °C)-98.7 °F (37.1 °C)] 98.7 °F (37.1 °C)  Pulse:  [] 97  Resp:  [13-30] 25  SpO2:  [89 %-95 %] 90 %  BP: ()/(59-85) 125/69     Weight: 77.1 kg (169 lb 15.6 oz)  Body mass index is 23.71 kg/m².    SpO2: (!) 90 %         Intake/Output Summary (Last 24 hours) at 10/30/2023 0921  Last data filed at 10/30/2023 0701  Gross per 24 hour   Intake 2841.25 ml   Output 30 ml   Net 2811.25 ml       Lines/Drains/Airways       Peripheral Intravenous Line  Duration                  Peripheral IV - Single Lumen 10/18/23 2141 20 G Anterior;Right Forearm 11 days         Peripheral IV - Single Lumen 10/29/23 0126 20 G Left;Posterior Hand 1 day                     Physical Exam  Constitutional:       Appearance: Normal appearance.   HENT:      Head: Normocephalic and atraumatic.   Cardiovascular:      Rate and Rhythm: Normal rate and regular rhythm.      Pulses: Normal pulses.      Heart sounds: Normal heart sounds. No murmur heard.     No friction rub. No gallop.   Pulmonary:      Effort: Pulmonary effort is normal.      Breath sounds: Normal breath sounds. No wheezing, rhonchi or rales.   Musculoskeletal:      Right lower leg: No edema.      Left lower leg: No edema.   Skin:     General: Skin is warm.   Neurological:      Mental Status: He is alert and oriented to person, place, and time.          Significant Labs: All pertinent lab results from the last 24 hours have been reviewed.    Significant Imaging: Echocardiogram:  Transthoracic echo (TTE) complete (Cupid Only):   Results for orders placed during the hospital encounter of 10/29/23    Echo    Interpretation Summary    Left Ventricle: The left ventricle is normal in size. Increased ventricular mass. Increased wall thickness. There is concentric hypertrophy. Normal wall motion. There is mildly reduced systolic function with a visually estimated ejection fraction of 40 - 45%. Biplane (2D) method of discs ejection fraction is 45%. Unable to assess diastolic function due to atrial fibrillation.    Left Atrium: Normal left atrial size. There is an echolucency present in the left atrium visualized best on apical 4 chamber views, however,  no filling defect visualized with Echo contrast. Likely suggestive of hiatal hernia.    Right Ventricle: Normal right ventricular cavity size. Systolic function is normal.    Left Atrium: Normal left atrial size. There is an echolucency present in the left atrium visualized best on apical 4 chamber views, however, no filling defect visualized with Echo contrast. Likely suggestive of hiatal hernia.    Aortic Valve: The aortic valve is a trileaflet valve. Mildly calcified cusps.    Mitral Valve: There is mild bileaflet sclerosis.    Tricuspid Valve: There is mild regurgitation with an eccentrically directed jet.    Pulmonary Artery: The estimated pulmonary artery systolic pressure is 31 mmHg.    IVC/SVC: Normal venous pressure at 3 mmHg.    Assessment and Plan:     Troponin elevation, likely type II NSTEMI in the setting of uncontrolled HTN on presentation  Mild LV systolic dysfunction (LVEF 45%)  Gastric volvulus s/p emergent surgery  Paroxysmal afib    - There is an echolucency present in the left atrium visualized best on apical 4 chamber views, however, no filling defect visualized with administration of Echo contrast, hence, likely suggestive of artifact vs mass or thrombus (less likely). Recommend repeat limited echo tomorrow AM for better  visualization.   - Start AC when ok from surgical standpoint   - Recommend outpatient stress testing.   - Follow-up with primary cardiologist, Dr. Quezada.      Addendum 10/20/23: TTE re-reviewed: Echolucency is consistent with hiatal hernia. No filling defect with Echo contrast. Does not need further cardiac imaging for elucidation.       VTE Risk Mitigation (From admission, onward)           Ordered     IP VTE HIGH RISK PATIENT  Once         10/29/23 0857     Place sequential compression device  Until discontinued         10/29/23 0857     Place ULISES hose  Until discontinued         10/29/23 0857                    Thank you for your consult. Cardiology will sign off. Please contact us if you have any additional questions.    HAYLEY RODRIGUEZ MD  Cardiology   [facility]

## 2023-10-30 NOTE — PROGRESS NOTES
[facility]  Pulmonology  Progress Note    Patient Name: Joey King  MRN: [id number]  Admission Date: 10/29/2023  Hospital Length of Stay: 1 days  Code Status: Full Code  Attending Provider: Napoleon No MD  Primary Care Provider: Prashanth Ryan DO   Principal Problem: Gastric volvulus    Subjective:     Interval History:  Patient without complaints this morning      Objective:     Vital Signs (Most Recent):  Temp: 98.5 °F (36.9 °C) (10/30/23 0230)  Pulse: 92 (10/30/23 0300)  Resp: 17 (10/30/23 0523)  BP: 110/66 (10/30/23 0300)  SpO2: (!) 91 % (10/30/23 0300) Vital Signs (24h Range):  Temp:  [97.9 °F (36.6 °C)-98.5 °F (36.9 °C)] 98.5 °F (36.9 °C)  Pulse:  [] 92  Resp:  [13-30] 17  SpO2:  [90 %-97 %] 91 %  BP: ()/() 110/66     Weight: 77.1 kg (169 lb 15.6 oz)  Body mass index is 23.71 kg/m².      Intake/Output Summary (Last 24 hours) at 10/30/2023 0637  Last data filed at 10/30/2023 0300  Gross per 24 hour   Intake 2540 ml   Output 30 ml   Net 2510 ml        Physical Exam  Vitals reviewed.   Constitutional:       Appearance: Normal appearance.      Interventions: He is not intubated.  HENT:      Head: Normocephalic and atraumatic.      Nose: Nose normal.      Mouth/Throat:      Mouth: Mucous membranes are dry.      Pharynx: Oropharynx is clear.   Eyes:      Extraocular Movements: Extraocular movements intact.      Conjunctiva/sclera: Conjunctivae normal.      Pupils: Pupils are equal, round, and reactive to light.   Cardiovascular:      Rate and Rhythm: Normal rate.      Heart sounds: Normal heart sounds. No murmur heard.  Pulmonary:      Effort: Pulmonary effort is normal. He is not intubated.      Breath sounds: Normal breath sounds.   Abdominal:      General: Abdomen is flat. Bowel sounds are normal.      Palpations: Abdomen is soft.   Musculoskeletal:         General: Normal range of motion.      Cervical back: Normal range of motion and neck supple.       Right lower leg: No edema.      Left lower leg: No edema.   Skin:     General: Skin is warm and dry.      Capillary Refill: Capillary refill takes less than 2 seconds.   Neurological:      General: No focal deficit present.      Mental Status: He is alert and oriented to person, place, and time.   Psychiatric:         Mood and Affect: Mood normal.         Behavior: Behavior normal.           Review of Systems    Vents:       Lines/Drains/Airways       Peripheral Intravenous Line  Duration                  Peripheral IV - Single Lumen 10/18/23 2141 20 G Anterior;Right Forearm 11 days         Peripheral IV - Single Lumen 10/29/23 0126 20 G Left;Posterior Hand 1 day                    Significant Labs:    CBC/Anemia Profile:  Recent Labs   Lab 10/28/23  2347   WBC 5.57   HGB 12.4*   HCT 37.6*      MCV 94.5   RDW 14.1        Chemistries:  Recent Labs   Lab 10/28/23  2347      K 3.2*      CO2 29   BUN 9   CREATININE 1.08   CALCIUM 9.3   ALBUMIN 2.9*   PROT 6.8   BILITOT 0.6   ALKPHOS 145*   ALT 17   AST 23       Recent Lab Results         10/29/23  0848   10/29/23  0837   10/29/23  0808        ABO and RH O POS           Ao root annulus     3.15       Ao peak drew     1.06       Ao VTI     17.10       AV valve area     3.04       SCARLET by Velocity Ratio     3.40       AORTIC VALVE CUSP SEPERATION     2.32       AV mean gradient     3       AV index (prosthetic)     0.89       AV peak gradient     4       AV Velocity Ratio     1.00       Left Ventricle Relative Wall Thickness     0.60       E/A ratio     0.60       E/E' ratio     7.38       E wave deceleration time     149.50       FS     22       Group & Rh   O POS         INDIRECT AMANDO   NEG         IVC diameter     1.87       IVSd     1.5       Left Atrium Major Axis     3.17       LA size     2.28       LVOT area     3.4       LV LATERAL E/E' RATIO     6.86       LV SEPTAL E/E' RATIO     8.00       LV EDV BP     92.79       LVIDd     4.3       LVIDs      3.35       LV mass     232.23       Left Ventricular Outflow Tract Mean Gradient     1.63       Left Ventricular Outflow Tract Mean Velocity     0.59       LVOT diameter     2.08       LVOT peak cristino     1.06       LVOT stroke volume     51.96       LVOT peak VTI     15.30       LV ESV BP     45.84       Mean e'     0.07       MV valve area p 1/2 method     5.07       MV Peak A Cristino     0.80       MV Peak E Cristino     0.48       MV stenosis pressure 1/2 time     43.35       Skelton's Biplane MOD Ejection Fraction     40       PV peak gradient     13       PV PEAK VELOCITY     1.81       Posterior Wall     1.3       RA Major Axis     3.89       Est. RA pres     3       RV TB RVSP     6       RVDD     3.57       Specimen Outdate   11/01/2023 23:59         TAPSE     2.24       TDI SEPTAL     0.06       TDI LATERAL     0.07       Triscuspid Valve Regurgitation Peak Gradient     28       TR Max Cristino     2.63       Troponin I High Sensitivity   148.1         TV resting pulmonary artery pressure     31               Significant Imaging:  I have reviewed all pertinent imaging results/findings within the past 24 hours.    Assessment/Plan:     Psychiatric  Generalized anxiety disorder  Noted    Cardiac/Vascular  Essential hypertension  Blood pressure currently controlled    Elevated troponin  Elevated troponin probably type 2 ischemia nothing to do but control blood pressure on echo there was suggestion of an artifact versus a mass will need transesophageal echo when he is overall this needs to be anticoagulated paroxysmal atrial fibrillation and possibility of mass in atrium    Renal/  Hypokalemia  Supplement    GI  * Gastric volvulus  Patient is status post surgery for gastric gone was doing well    Gastroesophageal reflux disease without esophagitis  On Protonix    Taty-Colon tear  Recently admitted with his no indication that is an issue right now                 Napoleon No MD  Pulmonology  [facility]

## 2023-10-30 NOTE — ASSESSMENT & PLAN NOTE
Elevated troponin probably type 2 ischemia nothing to do but control blood pressure on echo there was suggestion of an artifact versus a mass will need transesophageal echo when he is overall this needs to be anticoagulated paroxysmal atrial fibrillation and possibility of mass in atrium

## 2023-10-30 NOTE — PLAN OF CARE
[facility]  Discharge Final Note    Primary Care Provider: Prashanth Ryan DO    Expected Discharge Date: 10/29/2023    Final Discharge Note (most recent)       Final Note - 10/30/23 0714          Final Note    Assessment Type Final Discharge Note (P)      Anticipated Discharge Disposition -- (P)    Transfered    Hospital Resources/Appts/Education Provided Provided patient/caregiver with written discharge plan information (P)         Post-Acute Status    Discharge Delays None known at this time (P)                      Important Message from Medicare             Contact Info       [facility] [place] [address] [address]   Phone: [phone]       Next Steps: Follow up on 2/22/2024    Instructions: With Dr. Arvind Quezada @ 1pm        Mr. King was transferred to [facility]

## 2023-10-30 NOTE — SUBJECTIVE & OBJECTIVE
Interval History:  Patient without complaints this morning      Objective:     Vital Signs (Most Recent):  Temp: 98.5 °F (36.9 °C) (10/30/23 0230)  Pulse: 92 (10/30/23 0300)  Resp: 17 (10/30/23 0523)  BP: 110/66 (10/30/23 0300)  SpO2: (!) 91 % (10/30/23 0300) Vital Signs (24h Range):  Temp:  [97.9 °F (36.6 °C)-98.5 °F (36.9 °C)] 98.5 °F (36.9 °C)  Pulse:  [] 92  Resp:  [13-30] 17  SpO2:  [90 %-97 %] 91 %  BP: ()/() 110/66     Weight: 77.1 kg (169 lb 15.6 oz)  Body mass index is 23.71 kg/m².      Intake/Output Summary (Last 24 hours) at 10/30/2023 0637  Last data filed at 10/30/2023 0300  Gross per 24 hour   Intake 2540 ml   Output 30 ml   Net 2510 ml        Physical Exam  Vitals reviewed.   Constitutional:       Appearance: Normal appearance.      Interventions: He is not intubated.  HENT:      Head: Normocephalic and atraumatic.      Nose: Nose normal.      Mouth/Throat:      Mouth: Mucous membranes are dry.      Pharynx: Oropharynx is clear.   Eyes:      Extraocular Movements: Extraocular movements intact.      Conjunctiva/sclera: Conjunctivae normal.      Pupils: Pupils are equal, round, and reactive to light.   Cardiovascular:      Rate and Rhythm: Normal rate.      Heart sounds: Normal heart sounds. No murmur heard.  Pulmonary:      Effort: Pulmonary effort is normal. He is not intubated.      Breath sounds: Normal breath sounds.   Abdominal:      General: Abdomen is flat. Bowel sounds are normal.      Palpations: Abdomen is soft.   Musculoskeletal:         General: Normal range of motion.      Cervical back: Normal range of motion and neck supple.      Right lower leg: No edema.      Left lower leg: No edema.   Skin:     General: Skin is warm and dry.      Capillary Refill: Capillary refill takes less than 2 seconds.   Neurological:      General: No focal deficit present.      Mental Status: He is alert and oriented to person, place, and time.   Psychiatric:         Mood and Affect: Mood  normal.         Behavior: Behavior normal.           Review of Systems    Vents:       Lines/Drains/Airways       Peripheral Intravenous Line  Duration                  Peripheral IV - Single Lumen 10/18/23 2141 20 G Anterior;Right Forearm 11 days         Peripheral IV - Single Lumen 10/29/23 0126 20 G Left;Posterior Hand 1 day                    Significant Labs:    CBC/Anemia Profile:  Recent Labs   Lab 10/28/23  2347   WBC 5.57   HGB 12.4*   HCT 37.6*      MCV 94.5   RDW 14.1        Chemistries:  Recent Labs   Lab 10/28/23  2347      K 3.2*      CO2 29   BUN 9   CREATININE 1.08   CALCIUM 9.3   ALBUMIN 2.9*   PROT 6.8   BILITOT 0.6   ALKPHOS 145*   ALT 17   AST 23       Recent Lab Results         10/29/23  0848   10/29/23  0837   10/29/23  0808        ABO and RH O POS           Ao root annulus     3.15       Ao peak drew     1.06       Ao VTI     17.10       AV valve area     3.04       SCARLET by Velocity Ratio     3.40       AORTIC VALVE CUSP SEPERATION     2.32       AV mean gradient     3       AV index (prosthetic)     0.89       AV peak gradient     4       AV Velocity Ratio     1.00       Left Ventricle Relative Wall Thickness     0.60       E/A ratio     0.60       E/E' ratio     7.38       E wave deceleration time     149.50       FS     22       Group & Rh   O POS         INDIRECT AMANDO   NEG         IVC diameter     1.87       IVSd     1.5       Left Atrium Major Axis     3.17       LA size     2.28       LVOT area     3.4       LV LATERAL E/E' RATIO     6.86       LV SEPTAL E/E' RATIO     8.00       LV EDV BP     92.79       LVIDd     4.3       LVIDs     3.35       LV mass     232.23       Left Ventricular Outflow Tract Mean Gradient     1.63       Left Ventricular Outflow Tract Mean Velocity     0.59       LVOT diameter     2.08       LVOT peak drew     1.06       LVOT stroke volume     51.96       LVOT peak VTI     15.30       LV ESV BP     45.84       Mean e'     0.07       MV valve  area p 1/2 method     5.07       MV Peak A Cristino     0.80       MV Peak E Cristino     0.48       MV stenosis pressure 1/2 time     43.35       Skelton's Biplane MOD Ejection Fraction     40       PV peak gradient     13       PV PEAK VELOCITY     1.81       Posterior Wall     1.3       RA Major Axis     3.89       Est. RA pres     3       RV TB RVSP     6       RVDD     3.57       Specimen Outdate   11/01/2023 23:59         TAPSE     2.24       TDI SEPTAL     0.06       TDI LATERAL     0.07       Triscuspid Valve Regurgitation Peak Gradient     28       TR Max Cristino     2.63       Troponin I High Sensitivity   148.1         TV resting pulmonary artery pressure     31               Significant Imaging:  I have reviewed all pertinent imaging results/findings within the past 24 hours.

## 2023-10-30 NOTE — PLAN OF CARE
Per rounds. Patient can go back to [facility] today if they can accept. Spoke with Cinthya at [facility] and patient can come back today. Notified Mey NP and gave packet to nurse Tricia. Spoke with patients daughter Ashley in the room to make her aware of dc today. IM done.

## 2023-10-30 NOTE — PROGRESS NOTES
[facility]  General Surgery  Progress Note    Subjective:     History of Present Illness:  No notes on file    Post-Op Info:  Procedure(s) (LRB):  XI ROBOTIC REPAIR, HERNIA, HIATAL (N/A)   1 Day Post-Op     Interval History: Patient w/o complaints on exam. S/p robotic hernia repair. Dsg d/I. Endorses flatus. Tolerating liquids    Medications:  Continuous Infusions:   sodium chloride 0.45% 75 mL/hr at 10/29/23 2344     Scheduled Meds:   apixaban  5 mg Oral BID    ARIPiprazole  5 mg Oral Daily    aspirin  81 mg Oral QHS    citalopram  40 mg Oral Daily    cyanocobalamin  1,000 mcg Oral Daily    flecainide  150 mg Oral Q12H    levothyroxine  100 mcg Oral Before breakfast    multivitamin  1 tablet Oral Daily    mupirocin   Nasal BID    pantoprazole  40 mg Intravenous Daily     PRN Meds:hydrALAZINE, LORazepam, melatonin, morphine, nicotine, ondansetron, sodium chloride 0.9%     Review of patient's allergies indicates:  No Known Allergies  Objective:     Vital Signs (Most Recent):  Temp: 98.7 °F (37.1 °C) (10/30/23 0730)  Pulse: 97 (10/30/23 0730)  Resp: (!) 25 (10/30/23 0730)  BP: 125/69 (10/30/23 0730)  SpO2: (!) 90 % (10/30/23 0730) Vital Signs (24h Range):  Temp:  [97.9 °F (36.6 °C)-98.7 °F (37.1 °C)] 98.7 °F (37.1 °C)  Pulse:  [] 97  Resp:  [14-30] 25  SpO2:  [89 %-95 %] 90 %  BP: ()/(59-76) 125/69     Weight: 77.1 kg (169 lb 15.6 oz)  Body mass index is 23.71 kg/m².    Intake/Output - Last 3 Shifts         10/28 0700  10/29 0659 10/29 0700  10/30 0659 10/30 0700  10/31 0659    I.V. (mL/kg)  1190 (15.4) 301.3 (3.9)    IV Piggyback  1350     Total Intake(mL/kg)  2540 (32.9) 301.3 (3.9)    Blood  30     Total Output  30     Net  +2510 +301.3           Urine Occurrence  4 x              Physical Exam  Vitals reviewed.   Constitutional:       General: He is not in acute distress.     Appearance: He is not ill-appearing or toxic-appearing.   HENT:      Head: Normocephalic and  atraumatic.      Nose: Nose normal.   Eyes:      General: No scleral icterus.     Extraocular Movements: Extraocular movements intact.   Cardiovascular:      Rate and Rhythm: Normal rate.      Pulses: Normal pulses.   Pulmonary:      Effort: Pulmonary effort is normal.      Breath sounds: Normal breath sounds.   Abdominal:      Palpations: Abdomen is soft.      Comments: Dsg d/i   Musculoskeletal:      Cervical back: No rigidity or tenderness.   Skin:     General: Skin is warm and dry.      Capillary Refill: Capillary refill takes less than 2 seconds.      Findings: No rash.   Neurological:      General: No focal deficit present.      Mental Status: He is alert and oriented to person, place, and time.   Psychiatric:         Mood and Affect: Mood normal.         Behavior: Behavior normal.          Significant Labs:  I have reviewed all pertinent lab results within the past 24 hours.    Significant Diagnostics:  I have reviewed all pertinent imaging results/findings within the past 24 hours.    Assessment/Plan:     No notes have been filed under this hospital service.  Service: General Surgery      Renny Rae, JAIR  General Surgery  [facility]

## 2023-10-30 NOTE — HPI
83-year-old male with a history of hypertension, atrial fibrillation, CHF, anxiety, depression pulmonary embolus in February 2023, hypothyroidism cholecystectomy was recently in [facility] a Taty-Colon tear had 2 hemostatic clips placed and was at [facility] for rehab.  On 1029 patient had acute onset of vomiting and chest pain he reports he begin with nausea vomiting and then developed chest pain he reports pain is lower anterior chest nonradiating denies shortness of breath palpitations lightheadedness and CT showed concern for developing gastric volvulus will sent down here for further evaluation by Dr. Long. Cardiology consulted for mild troponin elevation (31 ->99-> third troponin pending). Patient is already in OR for emergent surgery for gastric volvulus. Seen post-op. Denies chest pain, nausea or vomiting.

## 2023-10-30 NOTE — NURSING
Pt to be transferred back to [facility] for swing bed, report called to Cecelia pt and discharge orders placed in packet.

## 2023-10-30 NOTE — SUBJECTIVE & OBJECTIVE
Past Medical History:   Diagnosis Date    Back injury     CHF (congestive heart failure)     Hypertension     Thyroid disease        Past Surgical History:   Procedure Laterality Date    CHOLECYSTECTOMY      ROBOT-ASSISTED REPAIR OF HIATAL HERNIA USING DA NELSON XI N/A 10/29/2023    Procedure: XI ROBOTIC REPAIR, HERNIA, HIATAL;  Surgeon: Randy Long MD;  Location: [facility];  Service: General;  Laterality: N/A;    SHOULDER SURGERY      THYROID SURGERY         Review of patient's allergies indicates:  No Known Allergies    No current facility-administered medications on file prior to encounter.     Current Outpatient Medications on File Prior to Encounter   Medication Sig    ARIPiprazole (ABILIFY) 5 MG Tab 1 tablet.    aspirin (ECOTRIN) 81 MG EC tablet TAKE ONE TABLET BY MOUTH DAILY TO PREVENT BLOOD CLOT    citalopram (CELEXA) 40 MG tablet TAKE ONE TABLET BY MOUTH DAILY FOR DEPRESSION    cloNIDine (CATAPRES) 0.1 MG tablet TAKE ONE TABLET BY MOUTH DAILY FOR HIGH BLOOD PRESSURE    cyanocobalamin (VITAMIN B-12) 100 MCG tablet Take 1,000 mcg by mouth.    flecainide (TAMBOCOR) 150 MG Tab Take 1 tablet (150 mg total) by mouth every 12 (twelve) hours.    levothyroxine (SYNTHROID) 100 MCG tablet TAKE ONE TABLET BY MOUTH DAILY IN THE MORNING FOR THYROID REPLACEMENT    melatonin (MELATIN) Take 3 mg by mouth nightly as needed.    mirabegron (MYRBETRIQ) 50 mg Tb24 Take 1 tablet by mouth once daily.    multivit with minerals/lutein (MULTIVITAMIN 50 PLUS ORAL)     omega-3 fatty acids-fish oil 340-1,000 mg Cap Take 1 capsule by mouth.    omeprazole (PRILOSEC) 40 MG capsule 40 mg.    oxybutynin (DITROPAN XL) 15 MG TR24 Take 1 tablet by mouth once daily.    polyethylene glycol (GLYCOLAX) 17 gram/dose powder Take 17 g by mouth.    pyridoxine, vitamin B6, (B-6) 50 MG Tab Take 100 mg by mouth.    vitamin D (VITAMIN D3) 1000 units Tab TAKE ONE TABLET BY MOUTH DAILY (CHOLECALCIFEROL SAME AS VITAMIN D)    vitamin E 400 UNIT capsule  1 capsule.    zinc 50 mg Tab 1 tablet.    zolpidem (AMBIEN) 10 mg Tab Take 10 mg by mouth.     Family History    None       Tobacco Use    Smoking status: Never    Smokeless tobacco: Never   Substance and Sexual Activity    Alcohol use: Never    Drug use: Never    Sexual activity: Not Currently     Review of Systems   Constitutional: Negative for chills, fever, weight gain and weight loss.   Cardiovascular:  Negative for chest pain, dyspnea on exertion, irregular heartbeat, leg swelling, near-syncope, orthopnea, palpitations, paroxysmal nocturnal dyspnea and syncope.   Respiratory:  Negative for cough, shortness of breath and wheezing.    Gastrointestinal:  Negative for abdominal pain and diarrhea.     Objective:     Vital Signs (Most Recent):  Temp: 98.7 °F (37.1 °C) (10/30/23 0730)  Pulse: 97 (10/30/23 0730)  Resp: (!) 25 (10/30/23 0730)  BP: 125/69 (10/30/23 0730)  SpO2: (!) 90 % (10/30/23 0730) Vital Signs (24h Range):  Temp:  [97.9 °F (36.6 °C)-98.7 °F (37.1 °C)] 98.7 °F (37.1 °C)  Pulse:  [] 97  Resp:  [13-30] 25  SpO2:  [89 %-95 %] 90 %  BP: ()/(59-85) 125/69     Weight: 77.1 kg (169 lb 15.6 oz)  Body mass index is 23.71 kg/m².    SpO2: (!) 90 %         Intake/Output Summary (Last 24 hours) at 10/30/2023 0921  Last data filed at 10/30/2023 0701  Gross per 24 hour   Intake 2841.25 ml   Output 30 ml   Net 2811.25 ml       Lines/Drains/Airways       Peripheral Intravenous Line  Duration                  Peripheral IV - Single Lumen 10/18/23 2141 20 G Anterior;Right Forearm 11 days         Peripheral IV - Single Lumen 10/29/23 0126 20 G Left;Posterior Hand 1 day                     Physical Exam  Constitutional:       Appearance: Normal appearance.   HENT:      Head: Normocephalic and atraumatic.   Cardiovascular:      Rate and Rhythm: Normal rate and regular rhythm.      Pulses: Normal pulses.      Heart sounds: Normal heart sounds. No murmur heard.     No friction rub. No gallop.   Pulmonary:       Effort: Pulmonary effort is normal.      Breath sounds: Normal breath sounds. No wheezing, rhonchi or rales.   Musculoskeletal:      Right lower leg: No edema.      Left lower leg: No edema.   Skin:     General: Skin is warm.   Neurological:      Mental Status: He is alert and oriented to person, place, and time.          Significant Labs: All pertinent lab results from the last 24 hours have been reviewed.    Significant Imaging: Echocardiogram: Transthoracic echo (TTE) complete (Cupid Only):   Results for orders placed during the hospital encounter of 10/29/23    Echo    Interpretation Summary    Left Ventricle: The left ventricle is normal in size. Increased ventricular mass. Increased wall thickness. There is concentric hypertrophy. Normal wall motion. There is mildly reduced systolic function with a visually estimated ejection fraction of 40 - 45%. Biplane (2D) method of discs ejection fraction is 45%. Unable to assess diastolic function due to atrial fibrillation.    Left Atrium: Normal left atrial size. There is an echolucency present in the left atrium visualized best on apical 4 chamber views, however,  no filling defect visualized with Echo contrast. Likely suggestive of hiatal hernia.    Right Ventricle: Normal right ventricular cavity size. Systolic function is normal.    Left Atrium: Normal left atrial size. There is an echolucency present in the left atrium visualized best on apical 4 chamber views, however, no filling defect visualized with Echo contrast. Likely suggestive of hiatal hernia.    Aortic Valve: The aortic valve is a trileaflet valve. Mildly calcified cusps.    Mitral Valve: There is mild bileaflet sclerosis.    Tricuspid Valve: There is mild regurgitation with an eccentrically directed jet.    Pulmonary Artery: The estimated pulmonary artery systolic pressure is 31 mmHg.    IVC/SVC: Normal venous pressure at 3 mmHg.

## 2023-10-30 NOTE — PLAN OF CARE
[facility] ICU  Discharge Final Note    Primary Care Provider: Prashanth Ryan DO    Expected Discharge Date: 10/30/2023    Final Discharge Note (most recent)       Final Note - 10/30/23 1146          Final Note    Assessment Type Final Discharge Note     Anticipated Discharge Disposition Swing Bed        Post-Acute Status    Post-Acute Authorization Placement     Post-Acute Placement Status Set-up Complete/Auth obtained     Patient choice form signed by patient/caregiver List with quality metrics by geographic area provided;List from CMS Compare;List from System Post-Acute Care     Discharge Delays None known at this time                     Important Message from Medicare  Important Message from Medicare regarding Discharge Appeal Rights: Signed/date by patient/caregiver, Explained to patient/caregiver     Date IMM was signed: 10/30/23  Time IMM was signed: 1105     Follow-up providers       Randy Long MD   Specialty: General Surgery, Surgery    [address] [place] [address] [place] [address]   Phone: [phone]       Next Steps: Follow up in 2 week(s)    Instructions: hospital follow up              After-discharge care                Destination       [facility]   Service: Skilled Nursing    [address] [place] [address]   Phone: [phone]                             Patient to discharge to [facility]. Family aware.

## 2023-10-30 NOTE — DISCHARGE SUMMARY
[facility]  Pulmonology  Discharge Summary      Patient Name: Joey King  MRN: [id number]  Admission Date: 10/29/2023  Hospital Length of Stay: 1 days  Discharge Date and Time:  10/30/2023 11:16 AM  Attending Physician: Napoleon No MD   Discharging Provider: JERO Schilling-AGACNP  Primary Care Provider: Prashanth Ryan DO    HPI:  83-year-old male with multiple medical problems including hypertension atrial fibrillation CHF anxiety depression pulmonary embolus in February 2023 hypothyroidism cholecystectomy was recently in [facility] a Taty-Colon tear had 2 hemostatic clips placed and was at [facility] for rehab.  On 1029 patient had acute onset of vomiting and chest pain he reports he begin with nausea vomiting and then developed chest pain he reports pain is lower anterior chest nonradiating denies shortness of breath palpitations lightheadedness and CT showed concern for developing gastric volvulus will sent down here for further evaluation by Dr. Ambrosio patient was also noted to have a mildly elevated troponin 2nd check EKGs pending for me at this time patient denies any shortness of breath and no chest pain at this time      Procedure(s) (LRB):  XI ROBOTIC REPAIR, HERNIA, HIATAL (N/A)    Indwelling Lines/Drains at Time of Discharge:   Lines/Drains/Airways     None                 Hospital Course:  Mr King has met maximal benefit from this hospitalization. He has undergone surgical intervention for the volvulus. He is stable today with no complaints. Pain is controlled. Surgery is ok with patient being discharged and returning to [facility] for continued therapy. Patient and family deny any needs or complaints at present. Plan is to discharge back to [facility] today.       Goals of Care Treatment Preferences:  Code Status: Full Code      Consults (From admission, onward)        Status Ordering Provider     Inpatient consult to Cardiology  Once         Provider:  Gala Veronica MD    Completed RK ALVAREZ     IP consult to case management  Once        Provider:  (Not yet assigned)    Completed RK ALVAREZ          Significant Labs:  All pertinent labs within the past 24 hours have been reviewed.    Significant Imaging:  I have reviewed all pertinent imaging results/findings within the past 24 hours.    Pending Diagnostic Studies:     Procedure Component Value Units Date/Time    EKG 12-lead [[id number]]     Order Status: Sent Lab Status: No result     EKG 12-lead [[id number]]     Order Status: Sent Lab Status: No result     EKG 12-lead [[id number]]     Order Status: Sent Lab Status: No result     EXTRA TUBES [[id number]] Collected: 10/29/23 1332    Order Status: Sent Lab Status: In process Updated: 10/29/23 1332    Specimen: Blood, Venous     Narrative:      The following orders were created for panel order EXTRA TUBES.  Procedure                               Abnormality         Status                     ---------                               -----------         ------                     Light Blue Top Hold[[id number]]                             In process                 Lavender Top Hold[[id number]]                               In process                 Gold Top Hold[[id number]]                                   In process                   Please view results for these tests on the individual orders.        Final Active Diagnoses:    Diagnosis Date Noted POA    PRINCIPAL PROBLEM:  Gastric volvulus [K31.89] 10/29/2023 Yes    Elevated troponin [R79.89] 10/29/2023 Unknown    Taty-Colon tear [K22.6] 10/29/2023 Unknown    Hypokalemia [E87.6] 10/29/2023 Unknown    Troponin I above reference range [R79.89] 10/29/2023 Unknown    Chronic pain [G89.29] 10/23/2022 Yes    Cobalamin deficiency [E53.8] 10/23/2022 Yes    Essential hypertension [I10] 10/23/2022 Yes    Frequency of urination [R35.0] 10/23/2022 Yes    Gastroesophageal reflux  disease without esophagitis [K21.9] 10/23/2022 Yes    Generalized anxiety disorder [F41.1] 10/23/2022 Yes    Mixed hyperlipidemia [E78.2] 10/23/2022 Yes    Hypothyroidism [E03.9] 10/23/2022 Yes    Benign prostatic hyperplasia [N40.0] 05/14/2021 Yes      Problems Resolved During this Admission:       Discharged Condition: stable    Disposition: Swing Bed    Follow Up:   Contact information for follow-up providers     Randy Long MD Follow up in 2 week(s).    Specialties: General Surgery, Surgery  Why: hospital follow up  Contact information:  [address] [facility] [address]  [phone]                   Contact information for after-discharge care     Destination     [facility] .    Service: Skilled Nursing  Contact information:  [address]  [facility] [address] [phone]                           Patient Instructions:   No discharge procedures on file.  Medications:  Reconciled Home Medications:      Medication List      START taking these medications    apixaban 5 mg Tab  Commonly known as: ELIQUIS  Take 1 tablet (5 mg total) by mouth 2 (two) times daily.        CHANGE how you take these medications    * ARIPiprazole 5 MG Tab  Commonly known as: ABILIFY  1 tablet.  What changed: Another medication with the same name was added. Make sure you understand how and when to take each.     * ARIPiprazole 5 MG Tab  Commonly known as: ABILIFY  Take 1 tablet (5 mg total) by mouth once daily.  Start taking on: October 31, 2023  What changed: You were already taking a medication with the same name, and this prescription was added. Make sure you understand how and when to take each.     * aspirin 81 MG EC tablet  Commonly known as: ECOTRIN  TAKE ONE TABLET BY MOUTH DAILY TO PREVENT BLOOD CLOT  What changed: Another medication with the same name was added. Make sure you understand how and when to take each.     * aspirin 81 MG EC tablet  Commonly known as: ECOTRIN  Take 1  tablet (81 mg total) by mouth every evening.  What changed: You were already taking a medication with the same name, and this prescription was added. Make sure you understand how and when to take each.     * citalopram 40 MG tablet  Commonly known as: CeleXA  TAKE ONE TABLET BY MOUTH DAILY FOR DEPRESSION  What changed: Another medication with the same name was added. Make sure you understand how and when to take each.     * citalopram 40 MG tablet  Commonly known as: CeleXA  Take 1 tablet (40 mg total) by mouth once daily.  Start taking on: October 31, 2023  What changed: You were already taking a medication with the same name, and this prescription was added. Make sure you understand how and when to take each.     * cyanocobalamin 100 MCG tablet  Commonly known as: VITAMIN B-12  Take 1,000 mcg by mouth.  What changed: Another medication with the same name was added. Make sure you understand how and when to take each.     * cyanocobalamin 1000 MCG tablet  Commonly known as: VITAMIN B-12  Take 1 tablet (1,000 mcg total) by mouth once daily.  Start taking on: October 31, 2023  What changed: You were already taking a medication with the same name, and this prescription was added. Make sure you understand how and when to take each.     levothyroxine 100 MCG tablet  Commonly known as: SYNTHROID  Take 1 tablet (100 mcg total) by mouth before breakfast.  Start taking on: October 31, 2023  What changed: See the new instructions.     melatonin 3 mg tablet  Commonly known as: MELATIN  Take 2 tablets (6 mg total) by mouth nightly as needed for Insomnia.  What changed:   · medication strength  · how much to take  · reasons to take this         * This list has 8 medication(s) that are the same as other medications prescribed for you. Read the directions carefully, and ask your doctor or other care provider to review them with you.            CONTINUE taking these medications    cloNIDine 0.1 MG tablet  Commonly known as:  CATAPRES  TAKE ONE TABLET BY MOUTH DAILY FOR HIGH BLOOD PRESSURE     flecainide 150 MG Tab  Commonly known as: TAMBOCOR  Take 1 tablet (150 mg total) by mouth every 12 (twelve) hours.     mirabegron 50 mg Tb24  Commonly known as: MYRBETRIQ  Take 1 tablet by mouth once daily.     MULTIVITAMIN 50 PLUS ORAL     omega-3 fatty acids-fish oil 340-1,000 mg Cap  Take 1 capsule by mouth.     omeprazole 40 MG capsule  Commonly known as: PRILOSEC  40 mg.     oxybutynin 15 MG Tr24  Commonly known as: DITROPAN XL  Take 1 tablet by mouth once daily.     polyethylene glycol 17 gram/dose powder  Commonly known as: GLYCOLAX  Take 17 g by mouth.     pyridoxine (vitamin B6) 50 MG Tab  Commonly known as: B-6  Take 100 mg by mouth.     vitamin D 1000 units Tab  Commonly known as: VITAMIN D3  TAKE ONE TABLET BY MOUTH DAILY (CHOLECALCIFEROL SAME AS VITAMIN D)     vitamin E 400 UNIT capsule  1 capsule.     zinc 50 mg Tab  1 tablet.     zolpidem 10 mg Tab  Commonly known as: AMBIEN  Take 10 mg by mouth.          The patient has been seen and examined by both myself and Dr No on the date of discharge and determined to be suitable for discharge. Discussed with Dr. Long who is ok with discharge.       J[facility]illing[facility]  Pulmonology  [facility]

## 2023-10-31 VITALS
RESPIRATION RATE: 20 BRPM | BODY MASS INDEX: 25.21 KG/M2 | HEART RATE: 85 BPM | WEIGHT: 180.06 LBS | OXYGEN SATURATION: 93 % | HEIGHT: 71 IN | TEMPERATURE: 98 F | DIASTOLIC BLOOD PRESSURE: 85 MMHG | SYSTOLIC BLOOD PRESSURE: 150 MMHG

## 2023-10-31 PROBLEM — S72.142A: Status: ACTIVE | Noted: 2023-10-31

## 2023-10-31 PROCEDURE — 25000003 PHARM REV CODE 250: Performed by: NURSE PRACTITIONER

## 2023-10-31 PROCEDURE — 63600175 PHARM REV CODE 636 W HCPCS: Performed by: NURSE PRACTITIONER

## 2023-10-31 RX ORDER — ONDANSETRON 2 MG/ML
4 INJECTION INTRAMUSCULAR; INTRAVENOUS ONCE
Status: COMPLETED | OUTPATIENT
Start: 2023-10-31 | End: 2023-10-31

## 2023-10-31 RX ORDER — MORPHINE SULFATE 4 MG/ML
2 INJECTION, SOLUTION INTRAMUSCULAR; INTRAVENOUS ONCE
Status: COMPLETED | OUTPATIENT
Start: 2023-10-31 | End: 2023-10-31

## 2023-10-31 RX ORDER — ACETAMINOPHEN 325 MG/1
650 TABLET ORAL EVERY 6 HOURS PRN
Status: DISCONTINUED | OUTPATIENT
Start: 2023-10-31 | End: 2023-10-31 | Stop reason: HOSPADM

## 2023-10-31 RX ADMIN — ACETAMINOPHEN 650 MG: 325 TABLET ORAL at 12:10

## 2023-10-31 RX ADMIN — ONDANSETRON 4 MG: 2 INJECTION INTRAMUSCULAR; INTRAVENOUS at 01:10

## 2023-10-31 RX ADMIN — MORPHINE SULFATE 2 MG: 4 INJECTION, SOLUTION INTRAMUSCULAR; INTRAVENOUS at 01:10

## 2023-10-31 NOTE — SUBJECTIVE & OBJECTIVE
Past Medical History:   Diagnosis Date    Back injury     CHF (congestive heart failure)     Hypertension     Thyroid disease        Past Surgical History:   Procedure Laterality Date    CHOLECYSTECTOMY      ROBOT-ASSISTED REPAIR OF HIATAL HERNIA USING DA NELSON XI N/A 10/29/2023    Procedure: XI ROBOTIC REPAIR, HERNIA, HIATAL;  Surgeon: Randy Long MD;  Location: [facility];  Service: General;  Laterality: N/A;    SHOULDER SURGERY      THYROID SURGERY         Review of patient's allergies indicates:  No Known Allergies    Current Facility-Administered Medications on File Prior to Encounter   Medication    [DISCONTINUED] 0.45% NaCl infusion    [DISCONTINUED] apixaban tablet 5 mg    [DISCONTINUED] ARIPiprazole tablet 5 mg    [DISCONTINUED] aspirin EC tablet 81 mg    [DISCONTINUED] citalopram tablet 40 mg    [DISCONTINUED] cyanocobalamin tablet 1,000 mcg    [DISCONTINUED] flecainide tablet 150 mg    [DISCONTINUED] hydrALAZINE injection 5 mg    [DISCONTINUED] levothyroxine tablet 100 mcg    [DISCONTINUED] LORazepam tablet 0.5 mg    [DISCONTINUED] melatonin tablet 6 mg    [DISCONTINUED] morphine injection 4 mg    [DISCONTINUED] multivitamin tablet    [DISCONTINUED] mupirocin 2 % ointment    [DISCONTINUED] nicotine 21 mg/24 hr 1 patch    [DISCONTINUED] ondansetron disintegrating tablet 4 mg    [DISCONTINUED] pantoprazole injection 40 mg    [DISCONTINUED] sodium chloride 0.9% flush 10 mL     Current Outpatient Medications on File Prior to Encounter   Medication Sig    aspirin (ECOTRIN) 81 MG EC tablet Take 1 tablet (81 mg total) by mouth every evening.    apixaban (ELIQUIS) 5 mg Tab Take 1 tablet (5 mg total) by mouth 2 (two) times daily.    ARIPiprazole (ABILIFY) 5 MG Tab 1 tablet.    ARIPiprazole (ABILIFY) 5 MG Tab Take 1 tablet (5 mg total) by mouth once daily.    aspirin (ECOTRIN) 81 MG EC tablet TAKE ONE TABLET BY MOUTH DAILY TO PREVENT BLOOD CLOT    citalopram (CELEXA) 40 MG tablet TAKE ONE TABLET BY MOUTH  DAILY FOR DEPRESSION    citalopram (CELEXA) 40 MG tablet Take 1 tablet (40 mg total) by mouth once daily.    cloNIDine (CATAPRES) 0.1 MG tablet TAKE ONE TABLET BY MOUTH DAILY FOR HIGH BLOOD PRESSURE    cyanocobalamin (VITAMIN B-12) 100 MCG tablet Take 1,000 mcg by mouth.    cyanocobalamin (VITAMIN B-12) 1000 MCG tablet Take 1 tablet (1,000 mcg total) by mouth once daily.    flecainide (TAMBOCOR) 150 MG Tab Take 1 tablet (150 mg total) by mouth every 12 (twelve) hours.    levothyroxine (SYNTHROID) 100 MCG tablet Take 1 tablet (100 mcg total) by mouth before breakfast.    melatonin (MELATIN) 3 mg tablet Take 2 tablets (6 mg total) by mouth nightly as needed for Insomnia.    mirabegron (MYRBETRIQ) 50 mg Tb24 Take 1 tablet by mouth once daily.    multivit with minerals/lutein (MULTIVITAMIN 50 PLUS ORAL)     omega-3 fatty acids-fish oil 340-1,000 mg Cap Take 1 capsule by mouth.    omeprazole (PRILOSEC) 40 MG capsule 40 mg.    oxybutynin (DITROPAN XL) 15 MG TR24 Take 1 tablet by mouth once daily.    polyethylene glycol (GLYCOLAX) 17 gram/dose powder Take 17 g by mouth.    pyridoxine, vitamin B6, (B-6) 50 MG Tab Take 100 mg by mouth.    vitamin D (VITAMIN D3) 1000 units Tab TAKE ONE TABLET BY MOUTH DAILY (CHOLECALCIFEROL SAME AS VITAMIN D)    vitamin E 400 UNIT capsule 1 capsule.    zinc 50 mg Tab 1 tablet.    zolpidem (AMBIEN) 10 mg Tab Take 10 mg by mouth.     Family History    None       Tobacco Use    Smoking status: Never    Smokeless tobacco: Never   Substance and Sexual Activity    Alcohol use: Never    Drug use: Never    Sexual activity: Not Currently     Review of Systems   Respiratory:  Negative for cough and shortness of breath.    Cardiovascular:  Negative for chest pain.   Gastrointestinal:  Negative for abdominal pain.   Musculoskeletal:  Positive for gait problem.   Neurological:  Positive for weakness.     Objective:     Vital Signs (Most Recent):  Temp: 97.8 °F (36.6 °C) (10/31/23 0000)  Pulse: 85  (10/31/23 0155)  Resp: 20 (10/31/23 0130)  BP: (!) 150/85 (10/31/23 0155)  SpO2: (!) 93 % (10/31/23 0155) Vital Signs (24h Range):  Temp:  [97.7 °F (36.5 °C)-98.3 °F (36.8 °C)] 97.8 °F (36.6 °C)  Pulse:  [83-95] 85  Resp:  [18-28] 20  SpO2:  [91 %-95 %] 93 %  BP: (130-159)/(67-85) 150/85     Weight: 81.7 kg (180 lb 1 oz)  Body mass index is 25.11 kg/m².     Physical Exam  Constitutional:       General: He is awake. He is not in acute distress.     Appearance: Normal appearance. He is normal weight. He is ill-appearing. He is not toxic-appearing or diaphoretic.   HENT:      Head: Normocephalic and atraumatic.   Cardiovascular:      Rate and Rhythm: Normal rate and regular rhythm.      Pulses: Normal pulses.      Heart sounds: Normal heart sounds. No murmur heard.  Pulmonary:      Effort: Pulmonary effort is normal. No respiratory distress.      Breath sounds: Normal breath sounds. No stridor. No wheezing, rhonchi or rales.   Musculoskeletal:      Left elbow: No swelling, deformity, effusion or lacerations. Normal range of motion. Tenderness present in radial head.      Left forearm: Tenderness and bony tenderness present. No swelling, edema, deformity or lacerations.      Cervical back: Normal range of motion. No rigidity.      Left hip: Tenderness and bony tenderness present. No deformity, lacerations or crepitus. Decreased range of motion. Normal strength.      Left upper leg: Normal.      Comments: Mild shortening of the left leg, no rotation.   Skin:     General: Skin is warm and dry.      Capillary Refill: Capillary refill takes less than 2 seconds.   Neurological:      General: No focal deficit present.      Mental Status: He is alert and oriented to person, place, and time.   Psychiatric:         Mood and Affect: Mood normal.         Behavior: Behavior is cooperative.                Significant Labs: All pertinent labs within the past 24 hours have been reviewed.    Significant Imaging: I have reviewed all  pertinent imaging results/findings within the past 24 hours.

## 2023-10-31 NOTE — PROGRESS NOTES
[facility]  Hospital Medicine  Progress Note    Patient Name: Joey King  MRN: [id number]  Patient Class: IP- Swing   Admission Date: 10/30/2023  Length of Stay: 1 days  Attending Physician: Len Palacios IV, DO  Primary Care Provider: Prashanth Ryan DO        Subjective:     Principal Problem:<principal problem not specified>        HPI:  83-year-old male with multiple medical problems including hypertension atrial fibrillation CHF anxiety depression pulmonary embolus in February 2023 hypothyroidism cholecystectomy was recently in [facility] a Taty-Colon tear had 2 hemostatic clips placed and was at [facility] for rehab.  On 1029 patient had acute onset of vomiting and chest pain he reports he begin with nausea vomiting and then developed chest pain he reports pain is lower anterior chest nonradiating denies shortness of breath palpitations lightheadedness and CT showed concern for developing gastric volvulus will sent down here for further evaluation by Dr. Ambrosio patient was also noted to have a mildly elevated troponin 2nd check EKGs pending for me at this time patient denies any shortness of breath and no chest pain at this time          Hospital Course:  Mr King has met maximal benefit from this hospitalization. He has undergone surgical intervention for the volvulus. He is stable today with no complaints. Pain is controlled. Surgery is ok with patient being discharged and returning to [facility] for continued therapy. Patient and family deny any needs or complaints at present. Plan is to discharge back to [facility] today.      Above info is from [facility] stay. Mr. King has been accepted to swing bed at [facility]. His arrival is expected today.      Overview/Hospital Course:  10/30/23 Patient arrived earlier in the night to return for swing bed services following an admission at [facility] for hernia repair and gastric volvulus .  Pt  denies chest pain, abd pain and n/v.      10/31/23 RN called to notify me the patient got out of the bed and fell in the bathroom.  Pt c/o pain to left hip, left elbow and left forearm.  He denies hitting his head and denies LOC.  Xrays were done.  Elbow and FA xrays were normal.  Left hip xray showed intertrochanteric hip fracture.  NV intact.  I spoke with Dr Bonner regarding the patient.  He had accepted the patient for transfer; however, Damian is on total diversion.  [facility] called [facility] and Hospitalist and they refused to accepted because they were at capacity.  PFC called [facility] in [place], Dr Jaylen Leon has accepted the patient.        Interval History: Patient arrived for swing bed services.  However, in the middle of the night he got out of bed and fell in the bathroom.  He has left intertrochanteric hip fracture.  Dr Jaylen Leon has accepted the patient to the ER at [facility].  RN to notify the daughter.     Review of Systems   Respiratory:  Negative for cough and shortness of breath.    Cardiovascular:  Negative for chest pain.   Gastrointestinal:  Negative for abdominal pain.   Musculoskeletal:  Positive for gait problem.   Neurological:  Positive for weakness.     Objective:     Vital Signs (Most Recent):  Temp: 98.3 °F (36.8 °C) (10/30/23 1905)  Pulse: 84 (10/30/23 1941)  Resp: 20 (10/31/23 0130)  BP: 134/68 (10/30/23 1905)  SpO2: (!) 94 % (10/30/23 1941) Vital Signs (24h Range):  Temp:  [97.7 °F (36.5 °C)-98.7 °F (37.1 °C)] 98.3 °F (36.8 °C)  Pulse:  [84-99] 84  Resp:  [17-28] 20  SpO2:  [89 %-95 %] 94 %  BP: (110-142)/(63-73) 134/68     Weight: 81.7 kg (180 lb 1 oz)  Body mass index is 25.11 kg/m².    Intake/Output Summary (Last 24 hours) at 10/31/2023 0139  Last data filed at 10/30/2023 2230  Gross per 24 hour   Intake --   Output 100 ml   Net -100 ml         Physical Exam  Constitutional:       General: He is awake. He is not in acute distress.     Appearance: Normal appearance. He is  normal weight. He is ill-appearing. He is not toxic-appearing or diaphoretic.   HENT:      Head: Normocephalic and atraumatic.   Cardiovascular:      Rate and Rhythm: Normal rate and regular rhythm.      Pulses: Normal pulses.      Heart sounds: Normal heart sounds. No murmur heard.  Pulmonary:      Effort: Pulmonary effort is normal. No respiratory distress.      Breath sounds: Normal breath sounds. No stridor. No wheezing, rhonchi or rales.   Musculoskeletal:      Left elbow: No swelling, deformity, effusion or lacerations. Normal range of motion. Tenderness present in radial head.      Left forearm: Tenderness and bony tenderness present. No swelling, edema, deformity or lacerations.      Cervical back: Normal range of motion. No rigidity.      Left hip: Tenderness and bony tenderness present. No deformity, lacerations or crepitus. Decreased range of motion. Normal strength.      Left upper leg: Normal.      Comments: Mild shortening of the left leg, no rotation.   Skin:     General: Skin is warm and dry.      Capillary Refill: Capillary refill takes less than 2 seconds.   Neurological:      General: No focal deficit present.      Mental Status: He is alert and oriented to person, place, and time.   Psychiatric:         Mood and Affect: Mood normal.         Behavior: Behavior is cooperative.             Significant Labs: All pertinent labs within the past 24 hours have been reviewed.    Significant Imaging: I have reviewed all pertinent imaging results/findings within the past 24 hours.      Assessment/Plan:      Closed intertrochanteric fracture, left, initial encounter    Transfer for ortho services    H/O: GI bleed  10/30 has hx gi bleed  records from [facility] recent GI bleed - had EGD that [id number] mm bleeding  Taty Colon tear- had 2 hemastatic clips applied. Eliquis was resumed 10/20/23         Chronic diastolic CHF (congestive heart failure)  10/30/23 daily weights, intake and output      Echo    Interpretation Summary    Left Ventricle: The left ventricle is normal in size. Increased ventricular mass. Increased wall thickness. There is concentric hypertrophy. Normal wall motion. There is mildly reduced systolic function with a visually estimated ejection fraction of 40 - 45%. Biplane (2D) method of discs ejection fraction is 45%. Unable to assess diastolic function due to atrial fibrillation.    Left Atrium: Normal left atrial size. There is an echolucency present in the left atrium visualized best on apical 4 chamber views, however,  no filling defect visualized with Echo contrast. Likely suggestive of hiatal hernia.    Right Ventricle: Normal right ventricular cavity size. Systolic function is normal.    Left Atrium: Normal left atrial size. There is an echolucency present in the left atrium visualized best on apical 4 chamber views, however, no filling defect visualized with Echo contrast. Likely suggestive of hiatal hernia.    Aortic Valve: The aortic valve is a trileaflet valve. Mildly calcified cusps.    Mitral Valve: There is mild bileaflet sclerosis.    Tricuspid Valve: There is mild regurgitation with an eccentrically directed jet.    Pulmonary Artery: The estimated pulmonary artery systolic pressure is 31 mmHg.    IVC/SVC: Normal venous pressure at 3 mmHg.  .     Weakness  10/30/23 PT and OT to evaluate and treat    Depression  10/30/23 continue celexa 40 mg po daily      Paroxysmal atrial fibrillation  10/30/23 continue with eliquis 5 mg po bid   Continue flecanide     Hypothyroidism  10/30/23 continue levothyroxine daily 100 mcg      Gastroesophageal reflux disease without esophagitis  10/30/23 continue protonix      Essential hypertension  10/30/23 monitor BP   Continue clonidine 0.1 mg po daily     VTE Risk Mitigation (From admission, onward)         Ordered     apixaban tablet 5 mg  2 times daily         10/30/23 4632                Discharge Planning   DAVID:      Code  Status: Full Code   Is the patient medically ready for discharge?:     Reason for patient still in hospital (select all that apply): Other (specify) Will be transferred to [facility] for ortho services.                     JERO Mejia  Department of Hospital Medicine   [facility]

## 2023-10-31 NOTE — SUBJECTIVE & OBJECTIVE
Interval History: Patient arrived for swing bed services.  However, in the middle of the night he got out of bed and fell in the bathroom.  He has left intertrochanteric hip fracture.  Dr Jaylen Leon has accepted the patient to the ER at [facility].  RN to notify the daughter.     Review of Systems   Respiratory:  Negative for cough and shortness of breath.    Cardiovascular:  Negative for chest pain.   Gastrointestinal:  Negative for abdominal pain.   Musculoskeletal:  Positive for gait problem.   Neurological:  Positive for weakness.     Objective:     Vital Signs (Most Recent):  Temp: 98.3 °F (36.8 °C) (10/30/23 1905)  Pulse: 84 (10/30/23 1941)  Resp: 20 (10/31/23 0130)  BP: 134/68 (10/30/23 1905)  SpO2: (!) 94 % (10/30/23 1941) Vital Signs (24h Range):  Temp:  [97.7 °F (36.5 °C)-98.7 °F (37.1 °C)] 98.3 °F (36.8 °C)  Pulse:  [84-99] 84  Resp:  [17-28] 20  SpO2:  [89 %-95 %] 94 %  BP: (110-142)/(63-73) 134/68     Weight: 81.7 kg (180 lb 1 oz)  Body mass index is 25.11 kg/m².    Intake/Output Summary (Last 24 hours) at 10/31/2023 0139  Last data filed at 10/30/2023 2230  Gross per 24 hour   Intake --   Output 100 ml   Net -100 ml         Physical Exam  Constitutional:       General: He is awake. He is not in acute distress.     Appearance: Normal appearance. He is normal weight. He is ill-appearing. He is not toxic-appearing or diaphoretic.   HENT:      Head: Normocephalic and atraumatic.   Cardiovascular:      Rate and Rhythm: Normal rate and regular rhythm.      Pulses: Normal pulses.      Heart sounds: Normal heart sounds. No murmur heard.  Pulmonary:      Effort: Pulmonary effort is normal. No respiratory distress.      Breath sounds: Normal breath sounds. No stridor. No wheezing, rhonchi or rales.   Musculoskeletal:      Left elbow: No swelling, deformity, effusion or lacerations. Normal range of motion. Tenderness present in radial head.      Left forearm: Tenderness and bony tenderness present. No  swelling, edema, deformity or lacerations.      Cervical back: Normal range of motion. No rigidity.      Left hip: Tenderness and bony tenderness present. No deformity, lacerations or crepitus. Decreased range of motion. Normal strength.      Left upper leg: Normal.      Comments: Mild shortening of the left leg, no rotation.   Skin:     General: Skin is warm and dry.      Capillary Refill: Capillary refill takes less than 2 seconds.   Neurological:      General: No focal deficit present.      Mental Status: He is alert and oriented to person, place, and time.   Psychiatric:         Mood and Affect: Mood normal.         Behavior: Behavior is cooperative.             Significant Labs: All pertinent labs within the past 24 hours have been reviewed.    Significant Imaging: I have reviewed all pertinent imaging results/findings within the past 24 hours.

## 2023-10-31 NOTE — NURSING
0015: Patient transferred by bed to ray department.     0038: Patient back in room. Bed alarm on. Side rails up. Nonskid socks on. VS being monitored. Call bell and urinal near. Will monitor.       
0535: Spoke with patient daughter Ashley and gave the update.   
Called patient daughter Ashley twice. Message left.   
Entered patient room and noticed patient was not in the bed. Patient found on bathroom floor laying on left side. Patient awake and alert. Patient states he was trying to use the toilet. Patient denies hitting head. Patient states he is having pain in left hip and left arm. No skin injuries noted at this time. Assisted patient into bed with multiple staff. Nurse practitioner aware and at the bedside. Vital signs obtained and monitored. Vitals stable at this time.   
[employer] notified.   
Patient left hospital by ambulance.   
Report called to [facility]. Spoke with TAYLER Mcmahon.   
Ambulatory

## 2023-10-31 NOTE — DISCHARGE SUMMARY
[facility] - Medical Surgical Unit  Hospital Medicine  Discharge Summary      Patient Name: Joey King  MRN: [id number]  OTF: [id number]  Patient Class: IP- Swing  Admission Date: 10/30/2023  Hospital Length of Stay: 1 days  Discharge Date and Time:  10/31/2023 1:43 AM  Attending Physician: Len Palacios IV, DO   Discharging Provider: JERO Mejia  Primary Care Provider: Prashanth Ryan DO    Primary Care Team: Networked reference to record PCT     HPI:   83-year-old male with multiple medical problems including hypertension atrial fibrillation CHF anxiety depression pulmonary embolus in February 2023 hypothyroidism cholecystectomy was recently in [facility] a Taty-Colon tear had 2 hemostatic clips placed and was at [facility] for rehab.  On 1029 patient had acute onset of vomiting and chest pain he reports he begin with nausea vomiting and then developed chest pain he reports pain is lower anterior chest nonradiating denies shortness of breath palpitations lightheadedness and CT showed concern for developing gastric volvulus will sent down here for further evaluation by Dr. Ambrosio patient was also noted to have a mildly elevated troponin 2nd check EKGs pending for me at this time patient denies any shortness of breath and no chest pain at this time          Hospital Course:  Mr King has met maximal benefit from this hospitalization. He has undergone surgical intervention for the volvulus. He is stable today with no complaints. Pain is controlled. Surgery is ok with patient being discharged and returning to swingbed for continued therapy. Patient and family deny any needs or complaints at present. Plan is to discharge back to [facility] today.      Above info is from [facility] stay. Mr. King has been accepted to swing bed at [facility]. His arrival is expected today.      * No surgery found *      Hospital Course:   10/30/23 Patient arrived earlier in the night  to return for swing bed services following an admission at [facility] for hernia repair and gastric volvulus .  Pt denies chest pain, abd pain and n/v.      10/31/23 RN called to notify me the patient got out of the bed and fell in the bathroom.  Pt c/o pain to left hip, left elbow and left forearm.  He denies hitting his head and denies LOC.  Xrays were done.  Elbow and FA xrays were normal.  Left hip xray showed intertrochanteric hip fracture.  NV intact.  I spoke with Dr Bonner regarding the patient.  He had accepted the patient for transfer; however, [place] is on total diversion.  [facility] called [facility] and Hospitalist and they refused to accepted because they were at capacity.  P[facility] called [facility] in [place], Dr Jaylen Leon has accepted the patient.         Goals of Care Treatment Preferences:  Code Status: Full Code      Consults:   Consults (From admission, onward)        Status Ordering Provider     IP consult to case management  Once        Provider:  (Not yet assigned)    Acknowledged BRANDON FERNANDEZ IV     Inpatient consult to Registered Dietitian/Nutritionist  Once        Provider:  (Not yet assigned)    Acknowledged SUZY GIBSON          No new Assessment & Plan notes have been filed under this hospital service since the last note was generated.  Service: Hospital Medicine    Final Active Diagnoses:    Diagnosis Date Noted POA    PRINCIPAL PROBLEM:  Generalized weakness [R53.1] 10/29/2023 Yes    Closed intertrochanteric fracture, left, initial encounter [S72.142A] 10/31/2023 No    History of repair of hiatal hernia [Z98.890, Z87.19] 10/30/2023 Not Applicable    H/O: GI bleed [Z87.19] 10/13/2023 Not Applicable    Chronic diastolic CHF (congestive heart failure) [I50.32] 10/09/2023 Yes    Anxiety [F41.9] 10/09/2023 Yes    Weakness [R53.1] 03/29/2023 Yes    Paroxysmal atrial fibrillation [I48.0] 10/23/2022 Yes    Essential hypertension [I10] 10/23/2022 Yes    Gastroesophageal reflux  disease without esophagitis [K21.9] 10/23/2022 Yes    Hypothyroidism [E03.9] 10/23/2022 Yes    Depression [F32.A] 10/23/2022 Yes      Problems Resolved During this Admission:       Discharged Condition: stable    Disposition: Planned Readmission - *    Follow Up:    Patient Instructions:      Transfer patient   Order Comments: To [facility] in [facility].  Dr Jaylen Leon in the ER has accepted the patient.       Significant Diagnostic Studies: Labs:   BMP:   Recent Labs   Lab 10/30/23  0645   GLU 94   *   K 4.2      CO2 26   BUN 19*   CREATININE 1.17   CALCIUM 8.5   , CMP   Recent Labs   Lab 10/30/23  0645   *   K 4.2      CO2 26   GLU 94   BUN 19*   CREATININE 1.17   CALCIUM 8.5   ANIONGAP 11   , CBC   Recent Labs   Lab 10/30/23  0645   WBC 12.05*   HGB 11.2*   HCT 33.8*       and All labs within the past 24 hours have been reviewed  Radiology: X-Ray: left hip, left elbow and left forearm    Pending Diagnostic Studies:     Procedure Component Value Units Date/Time    X-Ray Elbow 2 Views Left [[id number]] Resulted: 10/31/23 0001    Order Status: Sent Lab Status: In process Updated: 10/31/23 0045    X-Ray Forearm Left [[id number]] Resulted: 10/31/23 0033    Order Status: Sent Lab Status: In process Updated: 10/31/23 0045    X-Ray Hip 2 or 3 views Left (with Pelvis when performed) [[id number]] Resulted: 10/31/23 0000    Order Status: Sent Lab Status: In process Updated: 10/31/23 0043         Medications:  Reconciled Home Medications:      Medication List      CONTINUE taking these medications    apixaban 5 mg Tab  Commonly known as: ELIQUIS  Take 1 tablet (5 mg total) by mouth 2 (two) times daily.     * ARIPiprazole 5 MG Tab  Commonly known as: ABILIFY  1 tablet.     * ARIPiprazole 5 MG Tab  Commonly known as: ABILIFY  Take 1 tablet (5 mg total) by mouth once daily.     * aspirin 81 MG EC tablet  Commonly known as: ECOTRIN  TAKE ONE TABLET BY MOUTH DAILY TO PREVENT BLOOD CLOT      * aspirin 81 MG EC tablet  Commonly known as: ECOTRIN  Take 1 tablet (81 mg total) by mouth every evening.     * citalopram 40 MG tablet  Commonly known as: CeleXA  TAKE ONE TABLET BY MOUTH DAILY FOR DEPRESSION     * citalopram 40 MG tablet  Commonly known as: CeleXA  Take 1 tablet (40 mg total) by mouth once daily.     cloNIDine 0.1 MG tablet  Commonly known as: CATAPRES  TAKE ONE TABLET BY MOUTH DAILY FOR HIGH BLOOD PRESSURE     * cyanocobalamin 100 MCG tablet  Commonly known as: VITAMIN B-12  Take 1,000 mcg by mouth.     * cyanocobalamin 1000 MCG tablet  Commonly known as: VITAMIN B-12  Take 1 tablet (1,000 mcg total) by mouth once daily.     flecainide 150 MG Tab  Commonly known as: TAMBOCOR  Take 1 tablet (150 mg total) by mouth every 12 (twelve) hours.     levothyroxine 100 MCG tablet  Commonly known as: SYNTHROID  Take 1 tablet (100 mcg total) by mouth before breakfast.     melatonin 3 mg tablet  Commonly known as: MELATIN  Take 2 tablets (6 mg total) by mouth nightly as needed for Insomnia.     mirabegron 50 mg Tb24  Commonly known as: MYRBETRIQ  Take 1 tablet by mouth once daily.     MULTIVITAMIN 50 PLUS ORAL     omega-3 fatty acids-fish oil 340-1,000 mg Cap  Take 1 capsule by mouth.     omeprazole 40 MG capsule  Commonly known as: PRILOSEC  40 mg.     oxybutynin 15 MG Tr24  Commonly known as: DITROPAN XL  Take 1 tablet by mouth once daily.     polyethylene glycol 17 gram/dose powder  Commonly known as: GLYCOLAX  Take 17 g by mouth.     pyridoxine (vitamin B6) 50 MG Tab  Commonly known as: B-6  Take 100 mg by mouth.     vitamin D 1000 units Tab  Commonly known as: VITAMIN D3  TAKE ONE TABLET BY MOUTH DAILY (CHOLECALCIFEROL SAME AS VITAMIN D)     vitamin E 400 UNIT capsule  1 capsule.     zinc 50 mg Tab  1 tablet.     zolpidem 10 mg Tab  Commonly known as: AMBIEN  Take 10 mg by mouth.         * This list has 8 medication(s) that are the same as other medications prescribed for you. Read the directions  carefully, and ask your doctor or other care provider to review them with you.                Indwelling Lines/Drains at time of discharge:   Lines/Drains/Airways     None                 Time spent on the discharge of patient: 35 minutes         JERO Mejia  Department of Hospital Medicine  [facility] - Medical Surgical Unit

## 2023-10-31 NOTE — HOSPITAL COURSE
10/30/23 Patient arrived earlier in the night to return for swing bed services following an admission at [facility] for hernia repair and gastric volvulus .  Pt denies chest pain, abd pain and n/v.      10/31/23 RN called to notify me the patient got out of the bed and fell in the bathroom.  Pt c/o pain to left hip, left elbow and left forearm.  He denies hitting his head and denies LOC.  Xrays were done.  Elbow and FA xrays were normal.  Left hip xray showed intertrochanteric hip fracture.  NV intact.  I spoke with Dr Bonner regarding the patient.  He had accepted the patient for transfer; however, [place] is on total diversion.  [facility] called [place] [facility] and Hospitalist and they refused to accepted because they were at capacity.  [facility] called [facility] in [place], Dr Jaylen Leon has accepted the patient.

## 2023-10-31 NOTE — H&P
[facility] - Medical Surgical Unit  Hospital Medicine  History & Physical    Patient Name: Joey King  MRN: [id number]  Patient Class: IP- Swing  Admission Date: 10/30/2023  Attending Physician: No att. providers found   Primary Care Provider: Prashanth Ryan DO         Patient information was obtained from ER records.     Subjective:     Principal Problem:Generalized weakness    Chief Complaint:   Chief Complaint   Patient presents with    Weakness        HPI: 83-year-old male with multiple medical problems including hypertension atrial fibrillation CHF anxiety depression pulmonary embolus in February 2023 hypothyroidism cholecystectomy was recently in [facility] a Taty-Colon tear had 2 hemostatic clips placed and was at [facility] for rehab.  On 1029 patient had acute onset of vomiting and chest pain he reports he begin with nausea vomiting and then developed chest pain he reports pain is lower anterior chest nonradiating denies shortness of breath palpitations lightheadedness and CT showed concern for developing gastric volvulus will sent down here for further evaluation by Dr. Ambrosio patient was also noted to have a mildly elevated troponin 2nd check EKGs pending for me at this time patient denies any shortness of breath and no chest pain at this time          Hospital Course:  Mr King has met maximal benefit from this hospitalization. He has undergone surgical intervention for the volvulus. He is stable today with no complaints. Pain is controlled. Surgery is ok with patient being discharged and returning to [facility] for continued therapy. Patient and family deny any needs or complaints at present. Plan is to discharge back to [facility] today.      Above info is from [facility] stay. Mr. King has been accepted to swing bed at [facility]. His arrival is expected today.      Past Medical History:   Diagnosis Date    Back injury     CHF (congestive heart failure)      Hypertension     Thyroid disease        Past Surgical History:   Procedure Laterality Date    CHOLECYSTECTOMY      ROBOT-ASSISTED REPAIR OF HIATAL HERNIA USING DA NELSON XI N/A 10/29/2023    Procedure: XI ROBOTIC REPAIR, HERNIA, HIATAL;  Surgeon: Randy Long MD;  Location: [facility];  Service: General;  Laterality: N/A;    SHOULDER SURGERY      THYROID SURGERY         Review of patient's allergies indicates:  No Known Allergies    Current Facility-Administered Medications on File Prior to Encounter   Medication    [DISCONTINUED] 0.45% NaCl infusion    [DISCONTINUED] apixaban tablet 5 mg    [DISCONTINUED] ARIPiprazole tablet 5 mg    [DISCONTINUED] aspirin EC tablet 81 mg    [DISCONTINUED] citalopram tablet 40 mg    [DISCONTINUED] cyanocobalamin tablet 1,000 mcg    [DISCONTINUED] flecainide tablet 150 mg    [DISCONTINUED] hydrALAZINE injection 5 mg    [DISCONTINUED] levothyroxine tablet 100 mcg    [DISCONTINUED] LORazepam tablet 0.5 mg    [DISCONTINUED] melatonin tablet 6 mg    [DISCONTINUED] morphine injection 4 mg    [DISCONTINUED] multivitamin tablet    [DISCONTINUED] mupirocin 2 % ointment    [DISCONTINUED] nicotine 21 mg/24 hr 1 patch    [DISCONTINUED] ondansetron disintegrating tablet 4 mg    [DISCONTINUED] pantoprazole injection 40 mg    [DISCONTINUED] sodium chloride 0.9% flush 10 mL     Current Outpatient Medications on File Prior to Encounter   Medication Sig    aspirin (ECOTRIN) 81 MG EC tablet Take 1 tablet (81 mg total) by mouth every evening.    apixaban (ELIQUIS) 5 mg Tab Take 1 tablet (5 mg total) by mouth 2 (two) times daily.    ARIPiprazole (ABILIFY) 5 MG Tab 1 tablet.    ARIPiprazole (ABILIFY) 5 MG Tab Take 1 tablet (5 mg total) by mouth once daily.    aspirin (ECOTRIN) 81 MG EC tablet TAKE ONE TABLET BY MOUTH DAILY TO PREVENT BLOOD CLOT    citalopram (CELEXA) 40 MG tablet TAKE ONE TABLET BY MOUTH DAILY FOR DEPRESSION    citalopram (CELEXA) 40 MG tablet Take  1 tablet (40 mg total) by mouth once daily.    cloNIDine (CATAPRES) 0.1 MG tablet TAKE ONE TABLET BY MOUTH DAILY FOR HIGH BLOOD PRESSURE    cyanocobalamin (VITAMIN B-12) 100 MCG tablet Take 1,000 mcg by mouth.    cyanocobalamin (VITAMIN B-12) 1000 MCG tablet Take 1 tablet (1,000 mcg total) by mouth once daily.    flecainide (TAMBOCOR) 150 MG Tab Take 1 tablet (150 mg total) by mouth every 12 (twelve) hours.    levothyroxine (SYNTHROID) 100 MCG tablet Take 1 tablet (100 mcg total) by mouth before breakfast.    melatonin (MELATIN) 3 mg tablet Take 2 tablets (6 mg total) by mouth nightly as needed for Insomnia.    mirabegron (MYRBETRIQ) 50 mg Tb24 Take 1 tablet by mouth once daily.    multivit with minerals/lutein (MULTIVITAMIN 50 PLUS ORAL)     omega-3 fatty acids-fish oil 340-1,000 mg Cap Take 1 capsule by mouth.    omeprazole (PRILOSEC) 40 MG capsule 40 mg.    oxybutynin (DITROPAN XL) 15 MG TR24 Take 1 tablet by mouth once daily.    polyethylene glycol (GLYCOLAX) 17 gram/dose powder Take 17 g by mouth.    pyridoxine, vitamin B6, (B-6) 50 MG Tab Take 100 mg by mouth.    vitamin D (VITAMIN D3) 1000 units Tab TAKE ONE TABLET BY MOUTH DAILY (CHOLECALCIFEROL SAME AS VITAMIN D)    vitamin E 400 UNIT capsule 1 capsule.    zinc 50 mg Tab 1 tablet.    zolpidem (AMBIEN) 10 mg Tab Take 10 mg by mouth.     Family History    None       Tobacco Use    Smoking status: Never    Smokeless tobacco: Never   Substance and Sexual Activity    Alcohol use: Never    Drug use: Never    Sexual activity: Not Currently     Review of Systems   Respiratory:  Negative for cough and shortness of breath.    Cardiovascular:  Negative for chest pain.   Gastrointestinal:  Negative for abdominal pain.   Musculoskeletal:  Positive for gait problem.   Neurological:  Positive for weakness.     Objective:     Vital Signs (Most Recent):  Temp: 97.8 °F (36.6 °C) (10/31/23 0000)  Pulse: 85 (10/31/23 0155)  Resp: 20 (10/31/23 0130)  BP:  (!) 150/85 (10/31/23 0155)  SpO2: (!) 93 % (10/31/23 0155) Vital Signs (24h Range):  Temp:  [97.7 °F (36.5 °C)-98.3 °F (36.8 °C)] 97.8 °F (36.6 °C)  Pulse:  [83-95] 85  Resp:  [18-28] 20  SpO2:  [91 %-95 %] 93 %  BP: (130-159)/(67-85) 150/85     Weight: 81.7 kg (180 lb 1 oz)  Body mass index is 25.11 kg/m².     Physical Exam  Constitutional:       General: He is awake. He is not in acute distress.     Appearance: Normal appearance. He is normal weight. He is ill-appearing. He is not toxic-appearing or diaphoretic.   HENT:      Head: Normocephalic and atraumatic.   Cardiovascular:      Rate and Rhythm: Normal rate and regular rhythm.      Pulses: Normal pulses.      Heart sounds: Normal heart sounds. No murmur heard.  Pulmonary:      Effort: Pulmonary effort is normal. No respiratory distress.      Breath sounds: Normal breath sounds. No stridor. No wheezing, rhonchi or rales.   Musculoskeletal:      Left elbow: No swelling, deformity, effusion or lacerations. Normal range of motion. Tenderness present in radial head.      Left forearm: Tenderness and bony tenderness present. No swelling, edema, deformity or lacerations.      Cervical back: Normal range of motion. No rigidity.      Left hip: Tenderness and bony tenderness present. No deformity, lacerations or crepitus. Decreased range of motion. Normal strength.      Left upper leg: Normal.      Comments: Mild shortening of the left leg, no rotation.   Skin:     General: Skin is warm and dry.      Capillary Refill: Capillary refill takes less than 2 seconds.   Neurological:      General: No focal deficit present.      Mental Status: He is alert and oriented to person, place, and time.   Psychiatric:         Mood and Affect: Mood normal.         Behavior: Behavior is cooperative.                Significant Labs: All pertinent labs within the past 24 hours have been reviewed.    Significant Imaging: I have reviewed all pertinent imaging results/findings within the past  24 hours.    Assessment/Plan:     Closed intertrochanteric fracture, left, initial encounter    Transfer for ortho services    H/O: GI bleed  10/30 has hx gi bleed  records from [facility] show recent GI bleed - had EGD that [id number] mm bleeding  Taty Colon tear- had 2 hemastatic clips applied. Eliquis was resumed 10/20/23         Chronic diastolic CHF (congestive heart failure)  10/30/23 daily weights, intake and output     Echo    Interpretation Summary    Left Ventricle: The left ventricle is normal in size. Increased ventricular mass. Increased wall thickness. There is concentric hypertrophy. Normal wall motion. There is mildly reduced systolic function with a visually estimated ejection fraction of 40 - 45%. Biplane (2D) method of discs ejection fraction is 45%. Unable to assess diastolic function due to atrial fibrillation.    Left Atrium: Normal left atrial size. There is an echolucency present in the left atrium visualized best on apical 4 chamber views, however,  no filling defect visualized with Echo contrast. Likely suggestive of hiatal hernia.    Right Ventricle: Normal right ventricular cavity size. Systolic function is normal.    Left Atrium: Normal left atrial size. There is an echolucency present in the left atrium visualized best on apical 4 chamber views, however, no filling defect visualized with Echo contrast. Likely suggestive of hiatal hernia.    Aortic Valve: The aortic valve is a trileaflet valve. Mildly calcified cusps.    Mitral Valve: There is mild bileaflet sclerosis.    Tricuspid Valve: There is mild regurgitation with an eccentrically directed jet.    Pulmonary Artery: The estimated pulmonary artery systolic pressure is 31 mmHg.    IVC/SVC: Normal venous pressure at 3 mmHg.  .     Weakness  10/30/23 PT and OT to evaluate and treat    Depression  10/30/23 continue celexa 40 mg po daily      Paroxysmal atrial fibrillation  10/30/23 continue with eliquis 5 mg po bid   Continue  flecanide     Hypothyroidism  10/30/23 continue levothyroxine daily 100 mcg      Gastroesophageal reflux disease without esophagitis  10/30/23 continue protonix      Essential hypertension  10/30/23 monitor BP   Continue clonidine 0.1 mg po daily       VTE Risk Mitigation (From admission, onward)    None                         Len Palacios IV,   Department of Hospital Medicine  [facility] Surgical [facility]    DUE - given  Family history is reviewed and has not changed   Pertinent information:

## 2023-11-04 LAB
BACTERIA BLD CULT: NORMAL
BACTERIA BLD CULT: NORMAL

## 2024-01-15 PROBLEM — K92.2 ACUTE UPPER GI BLEED: Status: RESOLVED | Noted: 2023-10-11 | Resolved: 2024-01-15

## 2024-11-19 NOTE — ASSESSMENT & PLAN NOTE
10/09/23 continue levothyroxine 100 mcg po daily     Clinically stable.  Continue Plavix in addition to aggressive risk factor modification for secondary prevention.

## 2025-01-03 DIAGNOSIS — I48.0 PAROXYSMAL ATRIAL FIBRILLATION: Primary | ICD-10-CM

## (undated) DEVICE — SEALER VESSEL EXTEND

## (undated) DEVICE — SUT STRATAFIX 1 SPIRAL .5

## (undated) DEVICE — GLOVE 6.5 PROTEXIS PI BLUE

## (undated) DEVICE — TUBING INSUFFLATION HEATED

## (undated) DEVICE — OBTURATOR BLADELESS 8MM XI CLR

## (undated) DEVICE — NDL DRIVER SUTURECUT MEGA XI

## (undated) DEVICE — GLOVE PROTEXIS PI SYN SURG 6.0

## (undated) DEVICE — GAUZE SPONGE XRAY 4X4

## (undated) DEVICE — Device

## (undated) DEVICE — DRESSING TELFA ISLAND 2X3.75IN

## (undated) DEVICE — GOWN POLY REINF BRTH SLV XL

## (undated) DEVICE — FORCEP FENESTRATED BIPOLAR

## (undated) DEVICE — SEAL UNIVERSAL 5MM-8MM XI

## (undated) DEVICE — SOL NACL IRR 1000ML BTL

## (undated) DEVICE — WARMER BLUE HEAT SCOPE 3-12MM

## (undated) DEVICE — SUT MONOCYRL 4-0 PS2 UND

## (undated) DEVICE — GLOVE PROTEXIS PI SYN SURG 7

## (undated) DEVICE — NED VARIE INSUFFLAT 14GX150

## (undated) DEVICE — DRAPE ARM DAVINCI XI

## (undated) DEVICE — SUT 2/0 36IN ETHIBOND EXCE

## (undated) DEVICE — ADHESIVE MASTISOL VIAL 48/BX

## (undated) DEVICE — GRASPER FEN TIP UP XI

## (undated) DEVICE — TROCAR ENDO Z THREAD KII 5X100

## (undated) DEVICE — STRIP MEDI WND CLSR 1/2X4IN